# Patient Record
Sex: FEMALE | Race: WHITE | NOT HISPANIC OR LATINO | Employment: OTHER | URBAN - METROPOLITAN AREA
[De-identification: names, ages, dates, MRNs, and addresses within clinical notes are randomized per-mention and may not be internally consistent; named-entity substitution may affect disease eponyms.]

---

## 2017-10-25 ENCOUNTER — HOSPITAL ENCOUNTER (OUTPATIENT)
Dept: NON INVASIVE DIAGNOSTICS | Facility: HOSPITAL | Age: 77
Discharge: HOME | End: 2017-10-25
Attending: PREVENTIVE MEDICINE
Payer: MEDICARE

## 2017-10-25 ENCOUNTER — TRANSCRIBE ORDERS (OUTPATIENT)
Dept: ADMINISTRATIVE | Facility: HOSPITAL | Age: 77
End: 2017-10-25

## 2017-10-25 ENCOUNTER — APPOINTMENT (OUTPATIENT)
Dept: WOUND CARE | Facility: HOSPITAL | Age: 77
End: 2017-10-25
Payer: MEDICARE

## 2017-10-25 DIAGNOSIS — M27.2 ABSCESS OF JAW: ICD-10-CM

## 2017-10-25 DIAGNOSIS — M27.2 ABSCESS OF JAW: Primary | ICD-10-CM

## 2017-10-25 PROCEDURE — 93005 ELECTROCARDIOGRAM TRACING: CPT

## 2017-10-25 PROCEDURE — 99213 OFFICE O/P EST LOW 20 MIN: CPT | Performed by: PREVENTIVE MEDICINE

## 2017-10-27 LAB
ATRIAL RATE: 75 BPM
P AXIS: 83 DEGREES
PR INTERVAL: 156 MS
QRS AXIS: 74 DEGREES
QRSD INTERVAL: 78 MS
QT INTERVAL: 400 MS
QTC INTERVAL: 446 MS
T WAVE AXIS: 69 DEGREES
VENTRICULAR RATE: 75 BPM

## 2017-11-06 ENCOUNTER — APPOINTMENT (OUTPATIENT)
Dept: WOUND CARE | Facility: HOSPITAL | Age: 77
End: 2017-11-06
Payer: MEDICARE

## 2017-11-06 PROCEDURE — G0277 HBOT, FULL BODY CHAMBER, 30M: HCPCS | Performed by: SURGERY

## 2017-11-07 ENCOUNTER — APPOINTMENT (OUTPATIENT)
Dept: WOUND CARE | Facility: HOSPITAL | Age: 77
End: 2017-11-07
Payer: MEDICARE

## 2017-11-07 PROCEDURE — G0277 HBOT, FULL BODY CHAMBER, 30M: HCPCS | Performed by: PREVENTIVE MEDICINE

## 2017-11-08 ENCOUNTER — APPOINTMENT (OUTPATIENT)
Dept: WOUND CARE | Facility: HOSPITAL | Age: 77
End: 2017-11-08
Payer: MEDICARE

## 2017-11-08 PROCEDURE — G0277 HBOT, FULL BODY CHAMBER, 30M: HCPCS | Performed by: FAMILY MEDICINE

## 2017-11-09 ENCOUNTER — APPOINTMENT (OUTPATIENT)
Dept: WOUND CARE | Facility: HOSPITAL | Age: 77
End: 2017-11-09
Payer: MEDICARE

## 2017-11-09 PROCEDURE — G0277 HBOT, FULL BODY CHAMBER, 30M: HCPCS | Performed by: FAMILY MEDICINE

## 2017-11-10 ENCOUNTER — APPOINTMENT (OUTPATIENT)
Dept: WOUND CARE | Facility: HOSPITAL | Age: 77
End: 2017-11-10
Payer: MEDICARE

## 2017-11-10 PROCEDURE — G0277 HBOT, FULL BODY CHAMBER, 30M: HCPCS | Performed by: FAMILY MEDICINE

## 2017-11-13 ENCOUNTER — APPOINTMENT (OUTPATIENT)
Dept: WOUND CARE | Facility: HOSPITAL | Age: 77
End: 2017-11-13
Payer: MEDICARE

## 2017-11-13 PROCEDURE — G0277 HBOT, FULL BODY CHAMBER, 30M: HCPCS | Performed by: FAMILY MEDICINE

## 2017-11-14 ENCOUNTER — APPOINTMENT (OUTPATIENT)
Dept: WOUND CARE | Facility: HOSPITAL | Age: 77
End: 2017-11-14
Payer: MEDICARE

## 2017-11-14 PROCEDURE — G0277 HBOT, FULL BODY CHAMBER, 30M: HCPCS | Performed by: PREVENTIVE MEDICINE

## 2017-11-15 ENCOUNTER — APPOINTMENT (OUTPATIENT)
Dept: WOUND CARE | Facility: HOSPITAL | Age: 77
End: 2017-11-15
Payer: MEDICARE

## 2017-11-15 PROCEDURE — G0277 HBOT, FULL BODY CHAMBER, 30M: HCPCS | Performed by: FAMILY MEDICINE

## 2017-11-16 ENCOUNTER — APPOINTMENT (OUTPATIENT)
Dept: WOUND CARE | Facility: HOSPITAL | Age: 77
End: 2017-11-16
Payer: MEDICARE

## 2017-11-16 PROCEDURE — G0277 HBOT, FULL BODY CHAMBER, 30M: HCPCS | Performed by: FAMILY MEDICINE

## 2017-11-17 ENCOUNTER — APPOINTMENT (OUTPATIENT)
Dept: WOUND CARE | Facility: HOSPITAL | Age: 77
End: 2017-11-17
Payer: MEDICARE

## 2017-11-17 PROCEDURE — G0277 HBOT, FULL BODY CHAMBER, 30M: HCPCS

## 2017-11-20 ENCOUNTER — APPOINTMENT (OUTPATIENT)
Dept: WOUND CARE | Facility: HOSPITAL | Age: 77
End: 2017-11-20
Payer: MEDICARE

## 2017-11-20 PROCEDURE — G0277 HBOT, FULL BODY CHAMBER, 30M: HCPCS | Performed by: SURGERY

## 2017-11-21 ENCOUNTER — APPOINTMENT (OUTPATIENT)
Dept: WOUND CARE | Facility: HOSPITAL | Age: 77
End: 2017-11-21
Payer: MEDICARE

## 2017-11-21 PROCEDURE — G0277 HBOT, FULL BODY CHAMBER, 30M: HCPCS | Performed by: PREVENTIVE MEDICINE

## 2017-11-22 ENCOUNTER — APPOINTMENT (OUTPATIENT)
Dept: WOUND CARE | Facility: HOSPITAL | Age: 77
End: 2017-11-22
Payer: MEDICARE

## 2017-11-22 PROCEDURE — G0277 HBOT, FULL BODY CHAMBER, 30M: HCPCS | Performed by: FAMILY MEDICINE

## 2017-11-24 ENCOUNTER — APPOINTMENT (OUTPATIENT)
Dept: WOUND CARE | Facility: HOSPITAL | Age: 77
End: 2017-11-24
Payer: MEDICARE

## 2017-11-24 PROCEDURE — G0277 HBOT, FULL BODY CHAMBER, 30M: HCPCS

## 2017-11-27 ENCOUNTER — APPOINTMENT (OUTPATIENT)
Dept: WOUND CARE | Facility: HOSPITAL | Age: 77
End: 2017-11-27
Payer: MEDICARE

## 2017-11-27 PROCEDURE — 99213 OFFICE O/P EST LOW 20 MIN: CPT | Performed by: SURGERY

## 2017-12-11 ENCOUNTER — APPOINTMENT (OUTPATIENT)
Dept: WOUND CARE | Facility: HOSPITAL | Age: 77
End: 2017-12-11
Payer: MEDICARE

## 2017-12-11 PROCEDURE — G0277 HBOT, FULL BODY CHAMBER, 30M: HCPCS | Performed by: SURGERY

## 2017-12-12 ENCOUNTER — APPOINTMENT (OUTPATIENT)
Dept: WOUND CARE | Facility: HOSPITAL | Age: 77
End: 2017-12-12
Payer: MEDICARE

## 2017-12-12 PROCEDURE — G0277 HBOT, FULL BODY CHAMBER, 30M: HCPCS | Performed by: PREVENTIVE MEDICINE

## 2017-12-13 ENCOUNTER — APPOINTMENT (OUTPATIENT)
Dept: WOUND CARE | Facility: HOSPITAL | Age: 77
End: 2017-12-13
Payer: MEDICARE

## 2017-12-13 PROCEDURE — G0277 HBOT, FULL BODY CHAMBER, 30M: HCPCS | Performed by: FAMILY MEDICINE

## 2017-12-14 ENCOUNTER — APPOINTMENT (OUTPATIENT)
Dept: WOUND CARE | Facility: HOSPITAL | Age: 77
End: 2017-12-14
Payer: MEDICARE

## 2017-12-14 PROCEDURE — G0277 HBOT, FULL BODY CHAMBER, 30M: HCPCS | Performed by: FAMILY MEDICINE

## 2017-12-15 ENCOUNTER — APPOINTMENT (OUTPATIENT)
Dept: WOUND CARE | Facility: HOSPITAL | Age: 77
End: 2017-12-15
Payer: MEDICARE

## 2017-12-15 PROCEDURE — G0277 HBOT, FULL BODY CHAMBER, 30M: HCPCS | Performed by: SURGERY

## 2017-12-18 ENCOUNTER — APPOINTMENT (OUTPATIENT)
Dept: WOUND CARE | Facility: HOSPITAL | Age: 77
End: 2017-12-18
Payer: MEDICARE

## 2017-12-18 PROCEDURE — G0277 HBOT, FULL BODY CHAMBER, 30M: HCPCS | Performed by: SURGERY

## 2017-12-19 ENCOUNTER — APPOINTMENT (OUTPATIENT)
Dept: WOUND CARE | Facility: HOSPITAL | Age: 77
End: 2017-12-19
Payer: MEDICARE

## 2017-12-19 PROCEDURE — G0277 HBOT, FULL BODY CHAMBER, 30M: HCPCS | Performed by: PREVENTIVE MEDICINE

## 2017-12-20 ENCOUNTER — APPOINTMENT (OUTPATIENT)
Dept: WOUND CARE | Facility: HOSPITAL | Age: 77
End: 2017-12-20
Payer: MEDICARE

## 2017-12-20 PROCEDURE — G0277 HBOT, FULL BODY CHAMBER, 30M: HCPCS | Performed by: FAMILY MEDICINE

## 2017-12-21 ENCOUNTER — APPOINTMENT (OUTPATIENT)
Dept: WOUND CARE | Facility: HOSPITAL | Age: 77
End: 2017-12-21
Payer: MEDICARE

## 2017-12-21 PROCEDURE — G0277 HBOT, FULL BODY CHAMBER, 30M: HCPCS | Performed by: FAMILY MEDICINE

## 2017-12-22 ENCOUNTER — APPOINTMENT (OUTPATIENT)
Dept: WOUND CARE | Facility: HOSPITAL | Age: 77
End: 2017-12-22
Payer: MEDICARE

## 2017-12-22 PROCEDURE — G0277 HBOT, FULL BODY CHAMBER, 30M: HCPCS

## 2017-12-26 ENCOUNTER — APPOINTMENT (OUTPATIENT)
Dept: WOUND CARE | Facility: HOSPITAL | Age: 77
End: 2017-12-26
Payer: MEDICARE

## 2017-12-26 PROCEDURE — G0277 HBOT, FULL BODY CHAMBER, 30M: HCPCS | Performed by: PREVENTIVE MEDICINE

## 2017-12-27 ENCOUNTER — APPOINTMENT (OUTPATIENT)
Dept: WOUND CARE | Facility: HOSPITAL | Age: 77
End: 2017-12-27
Payer: MEDICARE

## 2017-12-27 PROCEDURE — G0277 HBOT, FULL BODY CHAMBER, 30M: HCPCS | Performed by: FAMILY MEDICINE

## 2017-12-28 ENCOUNTER — APPOINTMENT (OUTPATIENT)
Dept: WOUND CARE | Facility: HOSPITAL | Age: 77
End: 2017-12-28
Payer: MEDICARE

## 2017-12-28 PROCEDURE — G0277 HBOT, FULL BODY CHAMBER, 30M: HCPCS | Performed by: FAMILY MEDICINE

## 2017-12-29 ENCOUNTER — APPOINTMENT (OUTPATIENT)
Dept: WOUND CARE | Facility: HOSPITAL | Age: 77
End: 2017-12-29
Payer: MEDICARE

## 2017-12-29 PROCEDURE — G0277 HBOT, FULL BODY CHAMBER, 30M: HCPCS | Performed by: FAMILY MEDICINE

## 2018-01-02 ENCOUNTER — APPOINTMENT (OUTPATIENT)
Dept: WOUND CARE | Facility: HOSPITAL | Age: 78
End: 2018-01-02
Payer: MEDICARE

## 2018-01-02 PROCEDURE — G0277 HBOT, FULL BODY CHAMBER, 30M: HCPCS | Performed by: PREVENTIVE MEDICINE

## 2018-01-03 ENCOUNTER — APPOINTMENT (OUTPATIENT)
Dept: WOUND CARE | Facility: HOSPITAL | Age: 78
End: 2018-01-03
Payer: MEDICARE

## 2018-01-03 PROCEDURE — G0277 HBOT, FULL BODY CHAMBER, 30M: HCPCS | Performed by: FAMILY MEDICINE

## 2018-03-28 ENCOUNTER — APPOINTMENT (OUTPATIENT)
Dept: WOUND CARE | Facility: HOSPITAL | Age: 78
End: 2018-03-28
Payer: MEDICARE

## 2018-03-28 PROCEDURE — 99213 OFFICE O/P EST LOW 20 MIN: CPT | Performed by: FAMILY MEDICINE

## 2018-04-02 ENCOUNTER — APPOINTMENT (OUTPATIENT)
Dept: WOUND CARE | Facility: HOSPITAL | Age: 78
End: 2018-04-02
Payer: MEDICARE

## 2018-04-02 PROCEDURE — G0277 HBOT, FULL BODY CHAMBER, 30M: HCPCS | Performed by: SURGERY

## 2018-04-03 ENCOUNTER — APPOINTMENT (OUTPATIENT)
Dept: WOUND CARE | Facility: HOSPITAL | Age: 78
End: 2018-04-03
Payer: MEDICARE

## 2018-04-03 PROCEDURE — G0277 HBOT, FULL BODY CHAMBER, 30M: HCPCS | Performed by: PREVENTIVE MEDICINE

## 2018-04-04 ENCOUNTER — APPOINTMENT (OUTPATIENT)
Dept: WOUND CARE | Facility: HOSPITAL | Age: 78
End: 2018-04-04
Payer: MEDICARE

## 2018-04-04 PROCEDURE — G0277 HBOT, FULL BODY CHAMBER, 30M: HCPCS | Performed by: FAMILY MEDICINE

## 2018-04-05 ENCOUNTER — APPOINTMENT (OUTPATIENT)
Dept: WOUND CARE | Facility: HOSPITAL | Age: 78
End: 2018-04-05
Payer: MEDICARE

## 2018-04-05 PROCEDURE — G0277 HBOT, FULL BODY CHAMBER, 30M: HCPCS | Performed by: FAMILY MEDICINE

## 2018-04-06 ENCOUNTER — APPOINTMENT (OUTPATIENT)
Dept: WOUND CARE | Facility: HOSPITAL | Age: 78
End: 2018-04-06
Payer: MEDICARE

## 2018-04-06 PROCEDURE — G0277 HBOT, FULL BODY CHAMBER, 30M: HCPCS | Performed by: FAMILY MEDICINE

## 2018-04-09 ENCOUNTER — APPOINTMENT (OUTPATIENT)
Dept: WOUND CARE | Facility: HOSPITAL | Age: 78
End: 2018-04-09
Payer: MEDICARE

## 2018-04-09 PROCEDURE — G0277 HBOT, FULL BODY CHAMBER, 30M: HCPCS | Performed by: SURGERY

## 2018-04-10 ENCOUNTER — APPOINTMENT (OUTPATIENT)
Dept: WOUND CARE | Facility: HOSPITAL | Age: 78
End: 2018-04-10
Payer: MEDICARE

## 2018-04-10 PROCEDURE — G0277 HBOT, FULL BODY CHAMBER, 30M: HCPCS | Performed by: PREVENTIVE MEDICINE

## 2018-04-11 ENCOUNTER — APPOINTMENT (OUTPATIENT)
Dept: WOUND CARE | Facility: HOSPITAL | Age: 78
End: 2018-04-11
Payer: MEDICARE

## 2018-04-11 PROCEDURE — G0277 HBOT, FULL BODY CHAMBER, 30M: HCPCS | Performed by: FAMILY MEDICINE

## 2018-04-12 ENCOUNTER — APPOINTMENT (OUTPATIENT)
Dept: WOUND CARE | Facility: HOSPITAL | Age: 78
End: 2018-04-12
Payer: MEDICARE

## 2018-04-12 PROCEDURE — G0277 HBOT, FULL BODY CHAMBER, 30M: HCPCS | Performed by: FAMILY MEDICINE

## 2018-04-13 ENCOUNTER — APPOINTMENT (OUTPATIENT)
Dept: WOUND CARE | Facility: HOSPITAL | Age: 78
End: 2018-04-13
Payer: MEDICARE

## 2018-04-13 PROCEDURE — G0277 HBOT, FULL BODY CHAMBER, 30M: HCPCS | Performed by: FAMILY MEDICINE

## 2020-09-17 ENCOUNTER — OFFICE VISIT (OUTPATIENT)
Dept: CARDIOLOGY CLINIC | Facility: CLINIC | Age: 80
End: 2020-09-17
Payer: MEDICARE

## 2020-09-17 VITALS
WEIGHT: 119 LBS | SYSTOLIC BLOOD PRESSURE: 120 MMHG | BODY MASS INDEX: 19.8 KG/M2 | HEART RATE: 62 BPM | TEMPERATURE: 98.9 F | DIASTOLIC BLOOD PRESSURE: 70 MMHG

## 2020-09-17 DIAGNOSIS — I31.3 PERICARDIAL EFFUSION: ICD-10-CM

## 2020-09-17 DIAGNOSIS — J43.2 CENTRILOBULAR EMPHYSEMA (HCC): ICD-10-CM

## 2020-09-17 DIAGNOSIS — I25.10 CORONARY ARTERY DISEASE INVOLVING NATIVE CORONARY ARTERY OF NATIVE HEART WITHOUT ANGINA PECTORIS: ICD-10-CM

## 2020-09-17 DIAGNOSIS — I95.1 ORTHOSTATIC HYPOTENSION: ICD-10-CM

## 2020-09-17 DIAGNOSIS — Z85.118 HISTORY OF LUNG CANCER IN ADULTHOOD: ICD-10-CM

## 2020-09-17 DIAGNOSIS — E44.0 MODERATE PROTEIN-CALORIE MALNUTRITION (HCC): ICD-10-CM

## 2020-09-17 DIAGNOSIS — I47.1 SUPRAVENTRICULAR TACHYCARDIA (HCC): ICD-10-CM

## 2020-09-17 DIAGNOSIS — I48.0 PAROXYSMAL ATRIAL FIBRILLATION (HCC): Primary | ICD-10-CM

## 2020-09-17 DIAGNOSIS — I08.0 MITRAL AND AORTIC VALVE REGURGITATION: ICD-10-CM

## 2020-09-17 PROBLEM — Z90.2 HISTORY OF LOBECTOMY OF LUNG: Status: ACTIVE | Noted: 2020-09-17

## 2020-09-17 PROBLEM — Z92.3 HISTORY OF HEAD AND NECK RADIATION: Status: ACTIVE | Noted: 2020-09-17

## 2020-09-17 PROBLEM — I51.89 DIASTOLIC DYSFUNCTION WITHOUT HEART FAILURE: Status: ACTIVE | Noted: 2020-09-17

## 2020-09-17 PROCEDURE — 93000 ELECTROCARDIOGRAM COMPLETE: CPT | Performed by: INTERNAL MEDICINE

## 2020-09-17 PROCEDURE — 99204 OFFICE O/P NEW MOD 45 MIN: CPT | Performed by: INTERNAL MEDICINE

## 2020-09-17 RX ORDER — ATORVASTATIN CALCIUM 20 MG/1
TABLET, FILM COATED ORAL
COMMUNITY

## 2020-09-17 RX ORDER — ERGOCALCIFEROL (VITAMIN D2) 200 MCG/ML
8000 DROPS ORAL DAILY
COMMUNITY
Start: 2020-08-07 | End: 2020-12-23 | Stop reason: ALTCHOICE

## 2020-09-17 RX ORDER — MIDODRINE HYDROCHLORIDE 2.5 MG/1
TABLET ORAL
COMMUNITY
Start: 2020-09-15 | End: 2021-01-01 | Stop reason: HOSPADM

## 2020-09-17 RX ORDER — LEVOTHYROXINE SODIUM 112 UG/1
112 TABLET ORAL DAILY
COMMUNITY
Start: 2020-08-19

## 2020-09-17 RX ORDER — DIGOXIN 250 MCG
TABLET ORAL
COMMUNITY
Start: 2020-07-23 | End: 2020-11-23 | Stop reason: SDUPTHER

## 2020-09-17 RX ORDER — ATENOLOL 25 MG/1
12.5 TABLET ORAL DAILY
COMMUNITY
Start: 2020-08-20 | End: 2021-01-01

## 2020-09-17 NOTE — PATIENT INSTRUCTIONS
CARDIOLOGY ASSESSMENT & PLAN:   Diagnosis ICD-10-CM Associated Orders   1  Paroxysmal atrial fibrillation (HCC)  I48 0 POCT ECG   2  Supraventricular tachycardia (HCC)  I47 1    3  Orthostatic hypotension  I95 1    4  Coronary artery disease involving native coronary artery of native heart without angina pectoris  I25 10    5  Moderate protein-calorie malnutrition (HCC)  E44 0    6  Mitral and aortic valve regurgitation  I08 0    7  Pericardial effusion  I31 3    8  History of lung cancer in adulthood  Z85 118    9  Centrilobular emphysema (HCC)  J43 2        Paroxysmal atrial fibrillation ([address] )  Ms Lois Irizarry is a pleasant 78-year-old female with paroxysmal atrial fibrillation initially diagnosed around 2017 and history of supraventricular tachycardia  She is noted to have recent atrial fibrillation episode with significant symptoms  Unfortunately atrial fibrillation is complicated with chronic hypotension, intolerance to beta-blocker, higher dose digoxin and calcium channel blocker therapy and protein calorie malnutrition  -- at this time I am advising her to continue current medical therapy with Eliquis 2 5 mg twice daily and atenolol 12 5 mg once daily  She is advised to record home heart rates and blood pressures and right down  If her systolic blood pressures are consistently less than 95 mmHg then we should increase the dose of midodrine to 5 mg 3 times daily  -- if she has recurrent episodes of atrial fibrillation and she is unable to get rate control medications then will have to consider her for AV nataly ablation with pacemaker implantation  -- she is advised to continue normal activities, and keep a diary of symptoms  -- Dietary and medical compliance are reinforced  -- Advised  to report any concerning symptoms such as chest pain, shortness of breath, decline in exercise tolerance or presyncope/syncope      -- advised that she may continue to be followed at [facility] [address] however if she does not want then we will be happy to follow her in our device clinic  I advised her that it is better to follow with 1 group of physicians as different physicians managing her treatment may risk mixing up of medications and cause symptoms    HIS

## 2020-09-17 NOTE — PROGRESS NOTES
[facility]  [id number] [facility] [address]  Phone#  [phone]  Fax#  [phone]  *-*-*-*-*-*-*-*-*-*-*-*-*-*-*-*-*-*-*-*-*-*-*-*-*-*-*-*-*-*-*-*-*-*-*-*-*-*-*-*-*-*-*-*-*-*-*-*-*-*-*-*-*-*  Nicole Wilder DATE: 09/17/20 6:13 PM  PATIENT NAME: Elise Daily [place]   1940    [id number]  Age: 78 y o  Sex: female  AUTHOR: Brian Barr MD  PRIMARYCARE PHYSICIAN: Gisele Garcia DO  REFERRING PHYSICIAN: Gisele Garcia DO  [facility] [address] [place] [address]   *-*-*-*-*-*-*-*-*-*-*-*-*-*-*-*-*-*-*-*-*-*-*-*-*-*-*-*-*-*-*-*-*-*-*-*-*-*-*-*-*-*-*-*-*-*-*-*-*-*-*-*-*-*-  REASON FOR REFERRAL:  Evaluation and management of paroxysmal atrial fibrillation with rapid ventricular response    *-*-*-*-*-*-*-*-*-*-*-*-*-*-*-*-*-*-*-*-*-*-*-*-*-*-*-*-*-*-*-*-*-*-*-*-*-*-*-*-*-*-*-*-*-*-*-*-*-*-*-*-*-*-  CARDIOLOGY ASSESSMENT & PLAN:   Diagnosis ICD-10-CM Associated Orders   1  Paroxysmal atrial fibrillation (HCC)  I48 0 POCT ECG   2  Supraventricular tachycardia (HCC)  I47 1    3  Orthostatic hypotension  I95 1    4  Coronary artery disease involving native coronary artery of native heart without angina pectoris  I25 10    5  Moderate protein-calorie malnutrition (HCC)  E44 0    6  Mitral and aortic valve regurgitation  I08 0    7  Pericardial effusion  I31 3    8  History of lung cancer in adulthood  Z85 118    9  Centrilobular emphysema (HCC)  J43 2        Paroxysmal atrial fibrillation ([facility] [address] )  Ms Tyra Melgoza is a pleasant 78-year-old female with paroxysmal atrial fibrillation initially diagnosed around 2017 and history of supraventricular tachycardia  She is noted to have recent atrial fibrillation episode with significant symptoms  Unfortunately atrial fibrillation is complicated with chronic hypotension, intolerance to beta-blocker, higher dose digoxin and calcium channel blocker therapy and protein calorie malnutrition      -- at this time I am advising her to continue current medical therapy with Eliquis 2 5 mg twice daily and atenolol 12 5 mg once daily  She is advised to record home heart rates and blood pressures and right down  If her systolic blood pressures are consistently less than 95 mmHg then we should increase the dose of midodrine to 5 mg 3 times daily  -- if she has recurrent episodes of atrial fibrillation and she is unable to get rate control medications then will have to consider her for AV nataly ablation with pacemaker implantation  -- she is advised to continue normal activities, and keep a diary of symptoms  -- Dietary and medical compliance are reinforced  -- Advised  to report any concerning symptoms such as chest pain, shortness of breath, decline in exercise tolerance or presyncope/syncope  -- advised that she may continue to be followed at [facility] however if she does not want then we will be happy to follow her in our device clinic  I advised her that it is better to follow with 1 group of physicians as different physicians managing her treatment may risk mixing up of medications and cause symptoms  *-*-*-*-*-*-*-*-*-*-*-*-*-*-*-*-*-*-*-*-*-*-*-*-*-*-*-*-*-*-*-*-*-*-*-*-*-*-*-*-*-*-*-*-*-*-*-*-*-*-*-*-*-*-  CURRENT ECG:  Results for orders placed or performed in visit on 09/17/20   POCT ECG    Narrative    Sinus rhythm, HR 62 beats per minute, low QRS voltages, normal axis, normal intervals, possible prior anteroseptal infarction, no significant ST T-wave abnormalities  *-*-*-*-*-*-*-*-*-*-*-*-*-*-*-*-*-*-*-*-*-*-*-*-*-*-*-*-*-*-*-*-*-*-*-*-*-*-*-*-*-*-*-*-*-*-*-*-*-*-*-*-*-*-  HISTORY OF PRESENT ILLNESS:  Ms Armin Nicholson is a pleasant 70-year-old  female with medical history significant for:    1  Coronary artery disease with nonobstructive CAD identified in 2015, 60% disease in LAD  2   Paroxysmal atrial fibrillation diagnosed in the remote past and noted to have recurrent bouts in the last few years  3  Essential hypertension  4  Orthostatic hypotension  5  Premature ventricular contractions  6  History of lung carcinoma stage IB (January 2013) status post  left upper lobectomy  7  Head and neck cancer April 2015, status s/p radiation therapy  7  Monoclonal gammopathy of unknown significance (MGUS)  9  History of dysphagia and gastroparesis, status post SPECT to placement in 2018  10  Status post loop recorder implantation May 2019  11  Hypothyroidism  12  Mixed dyslipidemia  13  Autonomic dysfunction    She is establishing care with us for management of her paroxysmal atrial fibrillation which she has been dealing for at least last 3 years  She has been followed at [address] with Dr Samanta Montana for last several years and was most recently seen by him on 20th of August   Patient states that she is having difficulty in following with [facility] and is establishing care with us for convenience sake  She was previously diagnosed with paroxysmal SVT and premature ventricular contractions  Recently on 18th of August she woke up from sleep with feeling of feeling dizzy and lightheaded and having uneasiness and tingling in her arms along with nausea  She activated her loop recorder device and transmission and this revealed revealed atrial fibrillation with rapid ventricular response the duration of which was for 1 hour and 50 minutes  Episode started at 746 in the morning  She was started on Eliquis based on her age and body weight  Her digoxin dose was not increased because she had previous toxicity when the level was higher  Subsequently she saw Dr Viktoriya Milan on August 20th and she was advised to take low-dose atenolol  (Previously patient was tried on metoprolol and calcium channel blockers but she has unable to take this medication due to low blood pressures and symptomatic hypotension  She has also been on midodrine in the past)      Since the last episode she has been feeling all right but has intermittent lightheadedness and her blood pressures have been low especially when she takes the metoprolol  Patient has severe protein calorie malnutrition as she is totally dependent on feeding through PEG tube as she cannot swallow due to her history of head and neck cancer  She is functionally quite active and has had no recent chest pain, unusual shortness of breath, orthopnea, palpitations, presyncope or syncope  She recently underwent echocardiogram at [facility]  Functional capacity status:  Good   (Excellent- >10 METs; Good: (7-10 METs); Moderate (4-7 METs); Poor (<= 4 METs)    Any chronic stressors:  None   (feeling of poor health, financial problems, and social isolation etc)  Tobacco or alcohol dependence: She quit smoking in 1985  She does not drink  *-*-*-*-*-*-*-*-*-*-*-*-*-*-*-*-*-*-*-*-*-*-*-*-*-*-*-*-*-*-*-*-*-*-*-*-*-*-*-*-*-*-*-*-*-*-*-*-*-*-*-*-*-*  PAST MEDICAL HISTORY:  Past Medical History:   Diagnosis Date    Abnormal ECG     Arrhythmia     Asthma     Atrial fibrillation (HCC)     Coronary artery disease     Disease of thyroid gland         1  Coronary artery disease with nonobstructive CAD identified in 2015, 60% disease in LAD  2  Paroxysmal atrial fibrillation diagnosed in the remote past and noted to have recurrent bouts in the last few years  3  Essential hypertension  4  Orthostatic hypotension  5  Premature ventricular contractions  6  History of lung carcinoma  (January 2013) status post  left upper lobectomy  Now with centrilobular emphysema  7  Head and neck cancer April 2015, status s/p radiation therapy  7  Monoclonal gammopathy of unknown significance (MGUS)  9  History of dysphagia and gastroparesis, status post SPECT to placement in 2018  10  Status post loop recorder implantation May 2019  11  Hypothyroidism  12  Mixed dyslipidemia  13  Autonomic dysfunction  14   Protein calorie malnutrition   PAST SURGICAL HISTORY:   Past Surgical History:   Procedure Laterality Date    CARDIAC CATHETERIZATION           FAMILY HISTORY:  Problem Relation Age of Onset    Coronary artery disease Father    Lung cancer Sister    Stroke Brother    Coronary artery disease Mother    Lung disease Daughter   metastatic lung cancer, dx age 62    Other Son   TBI due to accident    No Known Problems Son    No Known Problems Son    SOCIAL HISTORY:  Social History     Tobacco Use   Smoking Status Current Every Day Smoker    Packs/day: 0 50    Years: 20 00    Pack years: 10 00      Social History     Substance and Sexual Activity   Alcohol Use Not Currently     Social History     Substance and Sexual Activity   Drug Use Never         *-*-*-*-*-*-*-*-*-*-*-*-*-*-*-*-*-*-*-*-*-*-*-*-*-*-*-*-*-*-*-*-*-*-*-*-*-*-*-*-*-*-*-*-*-*-*-*-*-*-*-*-*-*  ALLERGIES:  Allergies   Allergen Reactions    Lisinopril Anaphylaxis and Other (See Comments)     Other reaction(s): Other (See Comments)  stridor      Albuterol Other (See Comments)     Other reaction(s): Other (See Comments)  Afib  Causes afib      Methylprednisolone Other (See Comments)     Dizziness   Patient states adverse effect, "i feel weird in the head"    CURRENT SCHEDULED MEDICATIONS:    Current Outpatient Medications:     apixaban (ELIQUIS) 2 5 mg, Take 2 5 mg by mouth 2 (two) times a day, Disp: , Rfl:     ergocalciferol (DRISDOL) 200 MCG/ML drops, 8,000 Units by Enteral route daily, Disp: , Rfl:     atenolol (TENORMIN) 25 mg tablet, Take 12 5 mg by mouth daily, Disp: , Rfl:     atorvastatin (LIPITOR) 20 mg tablet, atorvastatin 20 mg tablet, Disp: , Rfl:     digoxin (LANOXIN) 0 25 mg tablet, TAKE 0 5 TABLETS (0 125 MG TOTAL) BY G TUBE ROUTE DAILY  (1/2 TABLET), Disp: , Rfl:     levothyroxine 112 mcg tablet, Take 112 mcg by mouth daily, Disp: , Rfl:     midodrine (PROAMATINE) 2 5 mg tablet, , Disp: , Rfl: *-*-*-*-*-*-*-*-*-*-*-*-*-*-*-*-*-*-*-*-*-*-*-*-*-*-*-*-*-*-*-*-*-*-*-*-*-*-*-*-*-*-*-*-*-*-*-*-*-*-*-*-*-*  REVIEW OF SYMPTOMS:    Positive for:  Intermittent dizziness and lightheadedness, fatigue, recent episode of palpitation  Negative for: All remaining as reviewed below and in HPI   SYSTEM SYMPTOMS REVIEWED:  General--weight change, fever, night sweats  Respiratoryl-- Wheezing, shortness of breath, cough, URI symptoms, sputum, blood  Cardiovascular--chest pain, syncope, dyspnea on exertion, edema, decline in exercise tolerance, claudication   Gastrointestinal--persistent vomiting, diarrhea, abdominal distention, blood in stool   Muscular or skeletal--joint pain or swelling   Neurologic--headaches, syncope, abnormal movement  Hematologic--history of easy bruising and bleeding   Endocrine--thyroid enlargement, heat or cold intolerance, polyuria   Psychiatric--anxiety, depression      *-*-*-*-*-*-*-*-*-*-*-*-*-*-*-*-*-*-*-*-*-*-*-*-*-*-*-*-*-*-*-*-*-*-*-*-*-*-*-*-*-*-*-*-*-*-*-*-*-*-*-*-*-*  CURRENT OUTPATIENT MEDICATIONS:     Current Outpatient Medications:     apixaban (ELIQUIS) 2 5 mg, Take 2 5 mg by mouth 2 (two) times a day, Disp: , Rfl:     ergocalciferol (DRISDOL) 200 MCG/ML drops, 8,000 Units by Enteral route daily, Disp: , Rfl:     atenolol (TENORMIN) 25 mg tablet, Take 12 5 mg by mouth daily, Disp: , Rfl:     atorvastatin (LIPITOR) 20 mg tablet, atorvastatin 20 mg tablet, Disp: , Rfl:     digoxin (LANOXIN) 0 25 mg tablet, TAKE 0 5 TABLETS (0 125 MG TOTAL) BY G TUBE ROUTE DAILY  (1/2 TABLET), Disp: , Rfl:     levothyroxine 112 mcg tablet, Take 112 mcg by mouth daily, Disp: , Rfl:     midodrine (PROAMATINE) 2 5 mg tablet, , Disp: , Rfl:     *-*-*-*-*-*-*-*-*-*-*-*-*-*-*-*-*-*-*-*-*-*-*-*-*-*-*-*-*-*-*-*-*-*-*-*-*-*-*-*-*-*-*-*-*-*-*-*-*-*-*-*-*-*  VITAL SIGNS:  Vitals:    09/17/20 1440   BP: 120/70   BP Location: Left arm   Patient Position: Sitting   Cuff Size: Adult   Pulse: 62   Temp: 98 9 °F (37 2 °C)   Weight: 54 kg (119 lb)     Weight (last 2 days)     Date/Time   Weight    09/17/20 1440   54 (119)           ,   Wt Readings from Last 3 Encounters:   09/17/20 54 kg (119 lb)   04/08/16 45 4 kg (100 lb)    , Body mass index is 19 8 kg/m²  *-*-*-*-*-*-*-*-*-*-*-*-*-*-*-*-*-*-*-*-*-*-*-*-*-*-*-*-*-*-*-*-*-*-*-*-*-*-*-*-*-*-*-*-*-*-*-*-*-*-*-*-*-*-  PHYSICAL EXAM:  General Appearance:    Alert, cooperative, no distress, appears stated age, thin the built, slightly frail   Head, Eyes, ENT:     signs of protein calorie malnutrition, moist mucous mebranes  Evidence of focal cord dysfunction during speech  Neck:    extensive thinning and scarring of the neck region going to her history of neck cancer and thyroid dysfunction  Back:     Symmetric, no curvature  Has kyphosis  Lungs:     Respirations unlabored  decreased breath sounds bilaterally without wheeze rhonchi or rales,    Chest wall:    No tenderness or deformity   Heart:    Regular rate and rhythm, S1 and S2 normal, no murmur, rub  or gallop  Abdomen:     Soft, has  PEG tube you been place   Extremities:   Extremities normal, no cyanosis or edema    Skin:   Skin color, texture, turgor normal, no rashes or lesions     *-*-*-*-*-*-*-*-*-*-*-*-*-*-*-*-*-*-*-*-*-*-*-*-*-*-*-*-*-*-*-*-*-*-*-*-*-*-*-*-*-*-*-*-*-*-*-*-*-*-*-*-*-*-  LABORATORY DATA: I have personally reviewed the available laboratory data  Recent blood work at [facility] lobe arteries on 26th of August is reviewed  Her renal function and electrolytes are normal, sodium was 136, potassium was 4 5, her TSH in June 2020 was normal at 1 29  Her digoxin level was 1 2 on June 1, 2020           No results found for: K, CL, CO2, ANIONGAP, BUN, CREATININE, EGFR, GLUCOSE, CALCIUM, AST, ALT, ALKPHOS, PROT, BILITOT, MG  No results found for: WBC, HGB, PLT  No results found for: PT, PTT, INR  No results found for: CKMB, DIGOXIN  No results found for: TSH  No results found for: CHOL, HDL, LDL, TRIG   No results found for: HGBA1C  No results found for: Bandar Reeve, GRAMSTAIN, URINECX, WOUNDCULT, BODYFLUIDCUL, MRSACULTURE, INFLUAPCR, INFLUBPCR, RSVPCR, LEGIONELLAUR, CDIFFTOXINB    *-*-*-*-*-*-*-*-*-*-*-*-*-*-*-*-*-*-*-*-*-*-*-*-*-*-*-*-*-*-*-*-*-*-*-*-*-*-*-*-*-*-*-*-*-*-*-*-*-*-*-*-*-*-  RADIOLOGY RESULTS:  No results found  *-*-*-*-*-*-*-*-*-*-*-*-*-*-*-*-*-*-*-*-*-*-*-*-*-*-*-*-*-*-*-*-*-*-*-*-*-*-*-*-*-*-*-*-*-*-*-*-*-*-*-*-*-*-  LAST ECHOCARDIOGRAM AND OTHER CARDIOLOGY RESULTS:  Echocardiogram August 26, 2020:    Left Ventricle: There is mild concentric hypertrophy    Left Ventricle: Systolic function is normal with an ejection fraction   of 55-60%    Left Ventricle: Wall motion cannot be accurately assessed    Left Ventricle: There is grade I (mild) diastolic dysfunction and   normal left atrial pressure    Aortic Valve: The aortic valve is trileaflet  There is mild sclerosis    Aortic Valve: There is mild regurgitation    Mitral Valve: There is mild regurgitation    Pericardium: There is a small pericardial effusion    Pericardium: There is no echocardiographic evidence of tamponade  12/2015 Echo - EF 65%, mild AR, mild DD  6/2017 documented in ER lasting 30 seconds  2/2018 Echo - EF 60%, mod TR, PA 50-70  8/2019 Echo - EF 65%, normal  8/2020 detected on ILR, started on eliquis          *-*-*-*-*-*-*-*-*-*-*-*-*-*-*-*-*-*-*-*-*-*-*-*-*-*-*-*-*-*-*-*-*-*-*-*-*-*-*-*-*-*-*-*-*-*-*-*-*-*-*-*-*-*-  RADIOLOGY RESULTS:  No results found  *-*-*-*-*-*-*-*-*-*-*-*-*-*-*-*-*-*-*-*-*-*-*-*-*-*-*-*-*-*-*-*-*-*-*-*-*-*-*-*-*-*-*-*-*-*-*-*-*-*-*-*-*-*-  ECHOCARDIOGRAM AND OTHER CARDIOLOGY RESULTS:  No results found for this or any previous visit  No results found for this or any previous visit  No results found for this or any previous visit  No results found for this or any previous visit  *-*-*-*-*-*-*-*-*-*-*-*-*-*-*-*-*-*-*-*-*-*-*-*-*-*-*-*-*-*-*-*-*-*-*-*-*-*-*-*-*-*-*-*-*-*-*-*-*-*-*-*-*-*-  SIGNATURES:   @[facility]@   Ian Oneill MD     CC:   DO Shani Wilhelm DO

## 2020-09-17 NOTE — ASSESSMENT & PLAN NOTE
Ms Rusty Negron is a pleasant 26-year-old female with paroxysmal atrial fibrillation initially diagnosed around 2017 and history of supraventricular tachycardia  She is noted to have recent atrial fibrillation episode with significant symptoms  Unfortunately atrial fibrillation is complicated with chronic hypotension, intolerance to beta-blocker, higher dose digoxin and calcium channel blocker therapy and protein calorie malnutrition  -- at this time I am advising her to continue current medical therapy with Eliquis 2 5 mg twice daily and atenolol 12 5 mg once daily  She is advised to record home heart rates and blood pressures and right down  If her systolic blood pressures are consistently less than 95 mmHg then we should increase the dose of midodrine to 5 mg 3 times daily  -- if she has recurrent episodes of atrial fibrillation and she is unable to get rate control medications then will have to consider her for AV nataly ablation with pacemaker implantation  -- she is advised to continue normal activities, and keep a diary of symptoms  -- Dietary and medical compliance are reinforced  -- Advised  to report any concerning symptoms such as chest pain, shortness of breath, decline in exercise tolerance or presyncope/syncope  -- advised that she may continue to be followed at [facility] however if she does not want then we will be happy to follow her in our device clinic  I advised her that it is better to follow with 1 group of physicians as different physicians managing her treatment may risk mixing up of medications and cause symptoms

## 2020-09-21 ENCOUNTER — TELEPHONE (OUTPATIENT)
Dept: CARDIOLOGY CLINIC | Facility: CLINIC | Age: 80
End: 2020-09-21

## 2020-09-21 NOTE — TELEPHONE ENCOUNTER
Pt called stating Dr Kathy Ferreira called her left message he received her info from [facility] and was Joy give her a return call because he had some questions  Pt states Dr Kathy Ferreira has not yet returned call

## 2020-09-25 ENCOUNTER — IN-CLINIC DEVICE VISIT (OUTPATIENT)
Dept: CARDIOLOGY CLINIC | Facility: CLINIC | Age: 80
End: 2020-09-25
Payer: MEDICARE

## 2020-09-25 DIAGNOSIS — Z95.818 PRESENCE OF OTHER CARDIAC IMPLANTS AND GRAFTS: Primary | ICD-10-CM

## 2020-09-25 PROCEDURE — 93291 INTERROG DEV EVAL SCRMS IP: CPT | Performed by: INTERNAL MEDICINE

## 2020-09-25 NOTE — PROGRESS NOTES
Results for orders placed or performed in visit on 09/25/20   Cardiac EP device report    Narrative    DEVICE INTERROGATED IN THE [facility] OFFICE LOOP: TEMP: 98 4  BATTERY VOLTAGE "OK"  PRESENTING RHYTHM: NSR @ 68 BPM   R-WAVES: 0 33mV  NO PATIENT OR DEVICE ACTIVATED EPISODES  NO PROGRAMMING CHANGES MADE TO DEVICE PARAMETERS  NORMAL DEVICE FUNCTION    [address] [place] [address]

## 2020-10-19 ENCOUNTER — OFFICE VISIT (OUTPATIENT)
Dept: CARDIOLOGY CLINIC | Facility: CLINIC | Age: 80
End: 2020-10-19
Payer: MEDICARE

## 2020-10-19 VITALS
BODY MASS INDEX: 20.16 KG/M2 | HEIGHT: 65 IN | HEART RATE: 80 BPM | SYSTOLIC BLOOD PRESSURE: 151 MMHG | DIASTOLIC BLOOD PRESSURE: 89 MMHG | WEIGHT: 121 LBS | TEMPERATURE: 98 F

## 2020-10-19 DIAGNOSIS — I48.0 PAROXYSMAL ATRIAL FIBRILLATION (HCC): Primary | ICD-10-CM

## 2020-10-19 DIAGNOSIS — I47.1 SUPRAVENTRICULAR TACHYCARDIA (HCC): ICD-10-CM

## 2020-10-19 DIAGNOSIS — J43.2 CENTRILOBULAR EMPHYSEMA (HCC): ICD-10-CM

## 2020-10-19 DIAGNOSIS — I25.10 CORONARY ARTERY DISEASE INVOLVING NATIVE CORONARY ARTERY OF NATIVE HEART WITHOUT ANGINA PECTORIS: ICD-10-CM

## 2020-10-19 DIAGNOSIS — I31.3 PERICARDIAL EFFUSION: ICD-10-CM

## 2020-10-19 DIAGNOSIS — I51.89 DIASTOLIC DYSFUNCTION WITHOUT HEART FAILURE: ICD-10-CM

## 2020-10-19 DIAGNOSIS — E44.0 MODERATE PROTEIN-CALORIE MALNUTRITION (HCC): ICD-10-CM

## 2020-10-19 DIAGNOSIS — I08.0 MITRAL AND AORTIC VALVE REGURGITATION: ICD-10-CM

## 2020-10-19 PROCEDURE — 99214 OFFICE O/P EST MOD 30 MIN: CPT | Performed by: INTERNAL MEDICINE

## 2020-11-17 ENCOUNTER — TELEPHONE (OUTPATIENT)
Dept: CARDIOLOGY CLINIC | Facility: CLINIC | Age: 80
End: 2020-11-17

## 2020-11-18 DIAGNOSIS — I48.0 PAF (PAROXYSMAL ATRIAL FIBRILLATION) (HCC): Primary | ICD-10-CM

## 2020-11-22 ENCOUNTER — TELEPHONE (OUTPATIENT)
Dept: CARDIOLOGY CLINIC | Facility: CLINIC | Age: 80
End: 2020-11-22

## 2020-11-23 DIAGNOSIS — I48.0 PAF (PAROXYSMAL ATRIAL FIBRILLATION) (HCC): ICD-10-CM

## 2020-11-23 RX ORDER — DIGOXIN 125 MCG
125 TABLET ORAL EVERY OTHER DAY
Qty: 45 TABLET | Refills: 3 | Status: SHIPPED | OUTPATIENT
Start: 2020-11-23 | End: 2021-01-01 | Stop reason: SDUPTHER

## 2020-12-23 PROBLEM — Z93.1 S/P PERCUTANEOUS ENDOSCOPIC GASTROSTOMY (PEG) TUBE PLACEMENT (HCC): Status: ACTIVE | Noted: 2018-08-21

## 2020-12-23 PROBLEM — R13.10 DYSPHAGIA: Status: ACTIVE | Noted: 2020-12-23

## 2021-01-01 ENCOUNTER — TELEPHONE (OUTPATIENT)
Dept: NEUROLOGY | Facility: CLINIC | Age: 81
End: 2021-01-01

## 2021-01-01 ENCOUNTER — TELEPHONE (OUTPATIENT)
Dept: CARDIOLOGY CLINIC | Facility: CLINIC | Age: 81
End: 2021-01-01

## 2021-01-01 ENCOUNTER — OFFICE VISIT (OUTPATIENT)
Dept: CARDIOLOGY CLINIC | Facility: CLINIC | Age: 81
End: 2021-01-01
Payer: MEDICARE

## 2021-01-01 ENCOUNTER — HOSPITAL ENCOUNTER (OUTPATIENT)
Facility: HOSPITAL | Age: 81
Setting detail: OBSERVATION
Discharge: HOME HEALTH | End: 2021-03-16
Attending: EMERGENCY MEDICINE | Admitting: STUDENT IN AN ORGANIZED HEALTH CARE EDUCATION/TRAINING PROGRAM
Payer: MEDICARE

## 2021-01-01 ENCOUNTER — HOSPITAL ENCOUNTER (OUTPATIENT)
Dept: NUCLEAR MEDICINE | Facility: HOSPITAL | Age: 81
Discharge: HOME | End: 2021-06-24
Payer: MEDICARE

## 2021-01-01 ENCOUNTER — ANESTHESIA (OUTPATIENT)
Dept: GASTROENTEROLOGY | Facility: HOSPITAL | Age: 81
End: 2021-01-01

## 2021-01-01 ENCOUNTER — TELEPHONE (OUTPATIENT)
Dept: GASTROENTEROLOGY | Facility: HOSPITAL | Age: 81
End: 2021-01-01

## 2021-01-01 ENCOUNTER — NURSE TRIAGE (OUTPATIENT)
Dept: OTHER | Facility: OTHER | Age: 81
End: 2021-01-01

## 2021-01-01 ENCOUNTER — REMOTE DEVICE CLINIC VISIT (OUTPATIENT)
Dept: CARDIOLOGY CLINIC | Facility: CLINIC | Age: 81
End: 2021-01-01
Payer: MEDICARE

## 2021-01-01 ENCOUNTER — APPOINTMENT (EMERGENCY)
Dept: RADIOLOGY | Facility: HOSPITAL | Age: 81
End: 2021-01-01
Payer: MEDICARE

## 2021-01-01 ENCOUNTER — OFFICE VISIT (OUTPATIENT)
Dept: NEUROLOGY | Facility: CLINIC | Age: 81
End: 2021-01-01
Payer: MEDICARE

## 2021-01-01 ENCOUNTER — APPOINTMENT (EMERGENCY)
Dept: RADIOLOGY | Facility: HOSPITAL | Age: 81
DRG: 309 | End: 2021-01-01
Payer: MEDICARE

## 2021-01-01 ENCOUNTER — HOSPITAL ENCOUNTER (OUTPATIENT)
Dept: GASTROENTEROLOGY | Facility: HOSPITAL | Age: 81
Setting detail: OUTPATIENT SURGERY
Discharge: HOME | End: 2021-12-17
Attending: INTERNAL MEDICINE | Admitting: INTERNAL MEDICINE
Payer: MEDICARE

## 2021-01-01 ENCOUNTER — HOSPITAL ENCOUNTER (OUTPATIENT)
Dept: GASTROENTEROLOGY | Facility: HOSPITAL | Age: 81
Setting detail: OUTPATIENT SURGERY
Discharge: HOME | End: 2021-01-15
Attending: INTERNAL MEDICINE | Admitting: INTERNAL MEDICINE
Payer: MEDICARE

## 2021-01-01 ENCOUNTER — ANESTHESIA EVENT (OUTPATIENT)
Dept: GASTROENTEROLOGY | Facility: HOSPITAL | Age: 81
End: 2021-01-01

## 2021-01-01 ENCOUNTER — IMMUNIZATIONS (OUTPATIENT)
Dept: FAMILY MEDICINE CLINIC | Facility: HOSPITAL | Age: 81
End: 2021-01-01

## 2021-01-01 ENCOUNTER — HOSPITAL ENCOUNTER (INPATIENT)
Facility: HOSPITAL | Age: 81
LOS: 1 days | Discharge: HOME | DRG: 309 | End: 2021-08-15
Attending: EMERGENCY MEDICINE | Admitting: STUDENT IN AN ORGANIZED HEALTH CARE EDUCATION/TRAINING PROGRAM
Payer: MEDICARE

## 2021-01-01 ENCOUNTER — APPOINTMENT (OUTPATIENT)
Dept: NON INVASIVE DIAGNOSTICS | Facility: HOSPITAL | Age: 81
End: 2021-01-01
Payer: MEDICARE

## 2021-01-01 ENCOUNTER — HOSPITAL ENCOUNTER (OUTPATIENT)
Dept: NON INVASIVE DIAGNOSTICS | Facility: HOSPITAL | Age: 81
Discharge: HOME | End: 2021-06-24
Payer: MEDICARE

## 2021-01-01 ENCOUNTER — TELEPHONE (OUTPATIENT)
Dept: NON INVASIVE DIAGNOSTICS | Facility: HOSPITAL | Age: 81
End: 2021-01-01

## 2021-01-01 VITALS
RESPIRATION RATE: 18 BRPM | TEMPERATURE: 97.8 F | SYSTOLIC BLOOD PRESSURE: 146 MMHG | DIASTOLIC BLOOD PRESSURE: 65 MMHG | WEIGHT: 118.17 LBS | BODY MASS INDEX: 19.66 KG/M2 | OXYGEN SATURATION: 98 % | HEART RATE: 77 BPM

## 2021-01-01 VITALS
HEART RATE: 76 BPM | SYSTOLIC BLOOD PRESSURE: 89 MMHG | BODY MASS INDEX: 19.99 KG/M2 | HEIGHT: 65 IN | WEIGHT: 120 LBS | DIASTOLIC BLOOD PRESSURE: 60 MMHG

## 2021-01-01 VITALS
DIASTOLIC BLOOD PRESSURE: 75 MMHG | OXYGEN SATURATION: 99 % | RESPIRATION RATE: 16 BRPM | HEIGHT: 65 IN | WEIGHT: 119 LBS | SYSTOLIC BLOOD PRESSURE: 146 MMHG | TEMPERATURE: 97.6 F | BODY MASS INDEX: 19.83 KG/M2 | HEART RATE: 71 BPM

## 2021-01-01 VITALS
DIASTOLIC BLOOD PRESSURE: 84 MMHG | HEIGHT: 65 IN | WEIGHT: 120 LBS | SYSTOLIC BLOOD PRESSURE: 122 MMHG | BODY MASS INDEX: 19.99 KG/M2 | HEART RATE: 82 BPM

## 2021-01-01 VITALS
OXYGEN SATURATION: 96 % | HEART RATE: 80 BPM | HEIGHT: 65 IN | SYSTOLIC BLOOD PRESSURE: 146 MMHG | WEIGHT: 118.9 LBS | BODY MASS INDEX: 19.81 KG/M2 | DIASTOLIC BLOOD PRESSURE: 76 MMHG

## 2021-01-01 VITALS
SYSTOLIC BLOOD PRESSURE: 155 MMHG | BODY MASS INDEX: 19.98 KG/M2 | WEIGHT: 119.9 LBS | HEIGHT: 65 IN | DIASTOLIC BLOOD PRESSURE: 68 MMHG | TEMPERATURE: 97.5 F | HEART RATE: 91 BPM

## 2021-01-01 VITALS
DIASTOLIC BLOOD PRESSURE: 70 MMHG | WEIGHT: 120 LBS | BODY MASS INDEX: 19.99 KG/M2 | SYSTOLIC BLOOD PRESSURE: 140 MMHG | HEART RATE: 67 BPM | HEIGHT: 65 IN

## 2021-01-01 VITALS
HEIGHT: 65 IN | BODY MASS INDEX: 20.41 KG/M2 | SYSTOLIC BLOOD PRESSURE: 94 MMHG | DIASTOLIC BLOOD PRESSURE: 54 MMHG | HEART RATE: 84 BPM | WEIGHT: 122.5 LBS | OXYGEN SATURATION: 97 %

## 2021-01-01 VITALS
BODY MASS INDEX: 19.97 KG/M2 | DIASTOLIC BLOOD PRESSURE: 80 MMHG | TEMPERATURE: 96.5 F | SYSTOLIC BLOOD PRESSURE: 150 MMHG | WEIGHT: 120 LBS | HEART RATE: 65 BPM

## 2021-01-01 VITALS
BODY MASS INDEX: 19.76 KG/M2 | DIASTOLIC BLOOD PRESSURE: 88 MMHG | WEIGHT: 118.6 LBS | SYSTOLIC BLOOD PRESSURE: 124 MMHG | HEIGHT: 65 IN | HEART RATE: 79 BPM

## 2021-01-01 VITALS — HEART RATE: 66 BPM

## 2021-01-01 VITALS
SYSTOLIC BLOOD PRESSURE: 144 MMHG | HEIGHT: 65 IN | WEIGHT: 116.7 LBS | BODY MASS INDEX: 19.44 KG/M2 | OXYGEN SATURATION: 99 % | DIASTOLIC BLOOD PRESSURE: 70 MMHG | HEART RATE: 87 BPM

## 2021-01-01 VITALS
DIASTOLIC BLOOD PRESSURE: 80 MMHG | HEART RATE: 87 BPM | WEIGHT: 115 LBS | BODY MASS INDEX: 19.16 KG/M2 | SYSTOLIC BLOOD PRESSURE: 156 MMHG | HEIGHT: 65 IN

## 2021-01-01 VITALS
RESPIRATION RATE: 18 BRPM | OXYGEN SATURATION: 98 % | HEIGHT: 65 IN | DIASTOLIC BLOOD PRESSURE: 63 MMHG | TEMPERATURE: 97.5 F | SYSTOLIC BLOOD PRESSURE: 123 MMHG | WEIGHT: 118.17 LBS | HEART RATE: 91 BPM | BODY MASS INDEX: 19.69 KG/M2

## 2021-01-01 VITALS
OXYGEN SATURATION: 98 % | TEMPERATURE: 98.9 F | HEART RATE: 80 BPM | RESPIRATION RATE: 17 BRPM | SYSTOLIC BLOOD PRESSURE: 152 MMHG | WEIGHT: 118 LBS | HEIGHT: 62 IN | BODY MASS INDEX: 21.71 KG/M2 | DIASTOLIC BLOOD PRESSURE: 75 MMHG

## 2021-01-01 DIAGNOSIS — Z92.3 HISTORY OF RADIATION TO HEAD AND NECK REGION: ICD-10-CM

## 2021-01-01 DIAGNOSIS — I48.0 PAROXYSMAL ATRIAL FIBRILLATION (HCC): ICD-10-CM

## 2021-01-01 DIAGNOSIS — R06.00 DYSPNEA ON EXERTION: ICD-10-CM

## 2021-01-01 DIAGNOSIS — I36.1 NONRHEUMATIC TRICUSPID VALVE REGURGITATION: ICD-10-CM

## 2021-01-01 DIAGNOSIS — R00.2 PALPITATIONS: ICD-10-CM

## 2021-01-01 DIAGNOSIS — I95.1 ORTHOSTATIC HYPOTENSION: Primary | ICD-10-CM

## 2021-01-01 DIAGNOSIS — G62.9 PERIPHERAL POLYNEUROPATHY: ICD-10-CM

## 2021-01-01 DIAGNOSIS — R13.12 OROPHARYNGEAL DYSPHAGIA: ICD-10-CM

## 2021-01-01 DIAGNOSIS — Z95.818 PRESENCE OF OTHER CARDIAC IMPLANTS AND GRAFTS: Primary | ICD-10-CM

## 2021-01-01 DIAGNOSIS — Z93.1 S/P PERCUTANEOUS ENDOSCOPIC GASTROSTOMY (PEG) TUBE PLACEMENT (HCC): ICD-10-CM

## 2021-01-01 DIAGNOSIS — I51.89 DIASTOLIC DYSFUNCTION WITHOUT HEART FAILURE: ICD-10-CM

## 2021-01-01 DIAGNOSIS — E78.5 DYSLIPIDEMIA: ICD-10-CM

## 2021-01-01 DIAGNOSIS — N39.0 UTI (URINARY TRACT INFECTION): ICD-10-CM

## 2021-01-01 DIAGNOSIS — I25.10 CORONARY ARTERY DISEASE INVOLVING NATIVE HEART WITHOUT ANGINA PECTORIS, UNSPECIFIED VESSEL OR LESION TYPE: Primary | ICD-10-CM

## 2021-01-01 DIAGNOSIS — G90.9 AUTONOMIC DYSFUNCTION: ICD-10-CM

## 2021-01-01 DIAGNOSIS — I95.1 ORTHOSTATIC HYPOTENSION: ICD-10-CM

## 2021-01-01 DIAGNOSIS — I49.9 SUPRAVENTRICULAR ARRHYTHMIA: ICD-10-CM

## 2021-01-01 DIAGNOSIS — R55 NEAR SYNCOPE: ICD-10-CM

## 2021-01-01 DIAGNOSIS — I10 HYPERTENSION, UNSPECIFIED TYPE: ICD-10-CM

## 2021-01-01 DIAGNOSIS — I25.10 CORONARY ARTERY DISEASE INVOLVING NATIVE CORONARY ARTERY OF NATIVE HEART WITHOUT ANGINA PECTORIS: ICD-10-CM

## 2021-01-01 DIAGNOSIS — E44.0 MODERATE PROTEIN-CALORIE MALNUTRITION (HCC): Primary | ICD-10-CM

## 2021-01-01 DIAGNOSIS — I31.3 PERICARDIAL EFFUSION: ICD-10-CM

## 2021-01-01 DIAGNOSIS — I47.1 SUPRAVENTRICULAR TACHYCARDIA (HCC): ICD-10-CM

## 2021-01-01 DIAGNOSIS — I48.0 PAF (PAROXYSMAL ATRIAL FIBRILLATION) (HCC): ICD-10-CM

## 2021-01-01 DIAGNOSIS — R76.8 VOLTAGE-GATED POTASSIUM CHANNEL (VGKC) ANTIBODY POSITIVE: ICD-10-CM

## 2021-01-01 DIAGNOSIS — R42 DIZZINESS: Primary | ICD-10-CM

## 2021-01-01 DIAGNOSIS — I48.0 PAROXYSMAL ATRIAL FIBRILLATION (HCC): Primary | ICD-10-CM

## 2021-01-01 DIAGNOSIS — I08.0 MITRAL AND AORTIC VALVE REGURGITATION: ICD-10-CM

## 2021-01-01 DIAGNOSIS — Z85.118 HISTORY OF LUNG CANCER IN ADULTHOOD: ICD-10-CM

## 2021-01-01 DIAGNOSIS — R20.0 NUMBNESS IN RIGHT LEG: Primary | ICD-10-CM

## 2021-01-01 DIAGNOSIS — I48.0 PAF (PAROXYSMAL ATRIAL FIBRILLATION) (HCC): Primary | ICD-10-CM

## 2021-01-01 DIAGNOSIS — M51.36 LUMBAR DEGENERATIVE DISC DISEASE: ICD-10-CM

## 2021-01-01 DIAGNOSIS — Z23 ENCOUNTER FOR IMMUNIZATION: Primary | ICD-10-CM

## 2021-01-01 DIAGNOSIS — R42 EPISODIC LIGHTHEADEDNESS: ICD-10-CM

## 2021-01-01 DIAGNOSIS — I49.9 SUPRAVENTRICULAR ARRHYTHMIA: Primary | ICD-10-CM

## 2021-01-01 DIAGNOSIS — I25.10 CORONARY ARTERY DISEASE INVOLVING NATIVE CORONARY ARTERY OF NATIVE HEART WITHOUT ANGINA PECTORIS: Primary | ICD-10-CM

## 2021-01-01 DIAGNOSIS — Z98.890 HISTORY OF LOOP RECORDER: ICD-10-CM

## 2021-01-01 DIAGNOSIS — R42 DIZZINESS: ICD-10-CM

## 2021-01-01 DIAGNOSIS — R00.0 TACHYCARDIA: ICD-10-CM

## 2021-01-01 DIAGNOSIS — Z23 ENCOUNTER FOR IMMUNIZATION: ICD-10-CM

## 2021-01-01 DIAGNOSIS — I47.1 PAROXYSMAL SVT (SUPRAVENTRICULAR TACHYCARDIA) (HCC): Primary | ICD-10-CM

## 2021-01-01 DIAGNOSIS — R42 LIGHTHEADEDNESS: ICD-10-CM

## 2021-01-01 LAB
ALBUMIN SERPL BCP-MCNC: 3.7 G/DL (ref 3.5–5)
ALBUMIN SERPL BCP-MCNC: 3.7 G/DL (ref 3.5–5)
ALP SERPL-CCNC: 102 U/L (ref 46–116)
ALP SERPL-CCNC: 107 U/L (ref 46–116)
ALT SERPL W P-5'-P-CCNC: 45 U/L (ref 12–78)
ALT SERPL W P-5'-P-CCNC: 6 U/L (ref 12–78)
ANION GAP SERPL CALCULATED.3IONS-SCNC: 11 MMOL/L (ref 4–13)
ANION GAP SERPL CALCULATED.3IONS-SCNC: 11 MMOL/L (ref 4–13)
ANION GAP SERPL CALCULATED.3IONS-SCNC: 6 MMOL/L (ref 4–13)
ANION GAP SERPL CALCULATED.3IONS-SCNC: 9 MMOL/L (ref 4–13)
APTT PPP: 31 SECONDS (ref 23–37)
AST SERPL W P-5'-P-CCNC: 25 U/L (ref 5–45)
AST SERPL W P-5'-P-CCNC: 28 U/L (ref 5–45)
ATRIAL RATE: 74 BPM
ATRIAL RATE: 78 BPM
BACTERIA UR CULT: ABNORMAL
BACTERIA UR QL AUTO: ABNORMAL /HPF
BASOPHILS # BLD AUTO: 0.01 THOUSANDS/ΜL (ref 0–0.1)
BASOPHILS # BLD AUTO: 0.05 THOUSANDS/ΜL (ref 0–0.1)
BASOPHILS NFR BLD AUTO: 0 % (ref 0–1)
BASOPHILS NFR BLD AUTO: 1 % (ref 0–1)
BILIRUB DIRECT SERPL-MCNC: 0.11 MG/DL (ref 0–0.2)
BILIRUB SERPL-MCNC: 0.39 MG/DL (ref 0.2–1)
BILIRUB SERPL-MCNC: 0.43 MG/DL (ref 0.2–1)
BILIRUB UR QL STRIP: NEGATIVE
BUN SERPL-MCNC: 32 MG/DL (ref 5–25)
BUN SERPL-MCNC: 35 MG/DL (ref 5–25)
BUN SERPL-MCNC: 44 MG/DL (ref 5–25)
BUN SERPL-MCNC: 49 MG/DL (ref 5–25)
CALCIUM SERPL-MCNC: 9.2 MG/DL (ref 8.3–10.1)
CALCIUM SERPL-MCNC: 9.4 MG/DL (ref 8.3–10.1)
CALCIUM SERPL-MCNC: 9.6 MG/DL (ref 8.3–10.1)
CALCIUM SERPL-MCNC: 9.7 MG/DL (ref 8.3–10.1)
CHEST PAIN STATEMENT: NORMAL
CHLORIDE SERPL-SCNC: 101 MMOL/L (ref 100–108)
CHLORIDE SERPL-SCNC: 102 MMOL/L (ref 100–108)
CHLORIDE SERPL-SCNC: 103 MMOL/L (ref 100–108)
CHLORIDE SERPL-SCNC: 103 MMOL/L (ref 100–108)
CLARITY UR: ABNORMAL
CO2 SERPL-SCNC: 26 MMOL/L (ref 21–32)
CO2 SERPL-SCNC: 27 MMOL/L (ref 21–32)
CO2 SERPL-SCNC: 29 MMOL/L (ref 21–32)
CO2 SERPL-SCNC: 30 MMOL/L (ref 21–32)
COLOR UR: YELLOW
CREAT SERPL-MCNC: 0.84 MG/DL (ref 0.6–1.3)
CREAT SERPL-MCNC: 0.85 MG/DL (ref 0.6–1.3)
CREAT SERPL-MCNC: 0.86 MG/DL (ref 0.6–1.3)
CREAT SERPL-MCNC: 1.18 MG/DL (ref 0.6–1.3)
DIGOXIN SERPL-MCNC: 0.9 NG/ML (ref 0.8–2)
DIGOXIN SERPL-MCNC: 1 NG/ML (ref 0.8–2)
EOSINOPHIL # BLD AUTO: 0.02 THOUSAND/ΜL (ref 0–0.61)
EOSINOPHIL # BLD AUTO: 0.23 THOUSAND/ΜL (ref 0–0.61)
EOSINOPHIL NFR BLD AUTO: 0 % (ref 0–6)
EOSINOPHIL NFR BLD AUTO: 3 % (ref 0–6)
ERYTHROCYTE [DISTWIDTH] IN BLOOD BY AUTOMATED COUNT: 11.9 % (ref 11.6–15.1)
ERYTHROCYTE [DISTWIDTH] IN BLOOD BY AUTOMATED COUNT: 12 % (ref 11.6–15.1)
ERYTHROCYTE [DISTWIDTH] IN BLOOD BY AUTOMATED COUNT: 12.6 % (ref 11.6–15.1)
ERYTHROCYTE [DISTWIDTH] IN BLOOD BY AUTOMATED COUNT: 12.7 % (ref 11.6–15.1)
GFR SERPL CREATININE-BSD FRML MDRD: 43 ML/MIN/1.73SQ M
GFR SERPL CREATININE-BSD FRML MDRD: 63 ML/MIN/1.73SQ M
GFR SERPL CREATININE-BSD FRML MDRD: 64 ML/MIN/1.73SQ M
GFR SERPL CREATININE-BSD FRML MDRD: 64 ML/MIN/1.73SQ M
GLUCOSE P FAST SERPL-MCNC: 93 MG/DL (ref 65–99)
GLUCOSE SERPL-MCNC: 89 MG/DL (ref 65–140)
GLUCOSE SERPL-MCNC: 93 MG/DL (ref 65–140)
GLUCOSE SERPL-MCNC: 97 MG/DL (ref 65–140)
GLUCOSE SERPL-MCNC: 99 MG/DL (ref 65–140)
GLUCOSE UR STRIP-MCNC: NEGATIVE MG/DL
HCT VFR BLD AUTO: 35.4 % (ref 34.8–46.1)
HCT VFR BLD AUTO: 39.3 % (ref 34.8–46.1)
HCT VFR BLD AUTO: 39.9 % (ref 34.8–46.1)
HCT VFR BLD AUTO: 41 % (ref 34.8–46.1)
HGB BLD-MCNC: 11.4 G/DL (ref 11.5–15.4)
HGB BLD-MCNC: 12.5 G/DL (ref 11.5–15.4)
HGB BLD-MCNC: 13.1 G/DL (ref 11.5–15.4)
HGB BLD-MCNC: 13.3 G/DL (ref 11.5–15.4)
HGB UR QL STRIP.AUTO: ABNORMAL
IMM GRANULOCYTES # BLD AUTO: 0.02 THOUSAND/UL (ref 0–0.2)
IMM GRANULOCYTES # BLD AUTO: 0.17 THOUSAND/UL (ref 0–0.2)
IMM GRANULOCYTES NFR BLD AUTO: 0 % (ref 0–2)
IMM GRANULOCYTES NFR BLD AUTO: 1 % (ref 0–2)
INR PPP: 1.21 (ref 0.84–1.19)
KETONES UR STRIP-MCNC: NEGATIVE MG/DL
LEUKOCYTE ESTERASE UR QL STRIP: ABNORMAL
LYMPHOCYTES # BLD AUTO: 0.83 THOUSANDS/ΜL (ref 0.6–4.47)
LYMPHOCYTES # BLD AUTO: 1.32 THOUSANDS/ΜL (ref 0.6–4.47)
LYMPHOCYTES NFR BLD AUTO: 15 % (ref 14–44)
LYMPHOCYTES NFR BLD AUTO: 7 % (ref 14–44)
MAGNESIUM SERPL-MCNC: 2.3 MG/DL (ref 1.6–2.6)
MAGNESIUM SERPL-MCNC: 2.5 MG/DL (ref 1.6–2.6)
MAX DIASTOLIC BP: 102 MMHG
MAX HEART RATE: 96 BPM
MAX PREDICTED HEART RATE: 137 BPM
MAX. SYSTOLIC BP: 178 MMHG
MCH RBC QN AUTO: 32.5 PG (ref 26.8–34.3)
MCH RBC QN AUTO: 32.5 PG (ref 26.8–34.3)
MCH RBC QN AUTO: 33.1 PG (ref 26.8–34.3)
MCH RBC QN AUTO: 33.3 PG (ref 26.8–34.3)
MCHC RBC AUTO-ENTMCNC: 31.8 G/DL (ref 31.4–37.4)
MCHC RBC AUTO-ENTMCNC: 32.2 G/DL (ref 31.4–37.4)
MCHC RBC AUTO-ENTMCNC: 32.4 G/DL (ref 31.4–37.4)
MCHC RBC AUTO-ENTMCNC: 32.8 G/DL (ref 31.4–37.4)
MCV RBC AUTO: 101 FL (ref 82–98)
MCV RBC AUTO: 101 FL (ref 82–98)
MCV RBC AUTO: 102 FL (ref 82–98)
MCV RBC AUTO: 103 FL (ref 82–98)
MONOCYTES # BLD AUTO: 0.87 THOUSAND/ΜL (ref 0.17–1.22)
MONOCYTES # BLD AUTO: 1.47 THOUSAND/ΜL (ref 0.17–1.22)
MONOCYTES NFR BLD AUTO: 10 % (ref 4–12)
MONOCYTES NFR BLD AUTO: 12 % (ref 4–12)
NEUTROPHILS # BLD AUTO: 6.36 THOUSANDS/ΜL (ref 1.85–7.62)
NEUTROPHILS # BLD AUTO: 9.57 THOUSANDS/ΜL (ref 1.85–7.62)
NEUTS SEG NFR BLD AUTO: 71 % (ref 43–75)
NEUTS SEG NFR BLD AUTO: 80 % (ref 43–75)
NITRITE UR QL STRIP: POSITIVE
NON-SQ EPI CELLS URNS QL MICRO: ABNORMAL /HPF
NRBC BLD AUTO-RTO: 0 /100 WBCS
NRBC BLD AUTO-RTO: 0 /100 WBCS
NT-PROBNP SERPL-MCNC: 1119 PG/ML
NT-PROBNP SERPL-MCNC: 998 PG/ML
P AXIS: 68 DEGREES
P AXIS: 77 DEGREES
PH UR STRIP.AUTO: 6.5 [PH]
PLATELET # BLD AUTO: 258 THOUSANDS/UL (ref 149–390)
PLATELET # BLD AUTO: 265 THOUSANDS/UL (ref 149–390)
PLATELET # BLD AUTO: 312 THOUSANDS/UL (ref 149–390)
PLATELET # BLD AUTO: 328 THOUSANDS/UL (ref 149–390)
PMV BLD AUTO: 10.2 FL (ref 8.9–12.7)
PMV BLD AUTO: 10.4 FL (ref 8.9–12.7)
PMV BLD AUTO: 10.4 FL (ref 8.9–12.7)
PMV BLD AUTO: 9.7 FL (ref 8.9–12.7)
POTASSIUM SERPL-SCNC: 4 MMOL/L (ref 3.5–5.3)
POTASSIUM SERPL-SCNC: 4.2 MMOL/L (ref 3.5–5.3)
POTASSIUM SERPL-SCNC: 4.3 MMOL/L (ref 3.5–5.3)
POTASSIUM SERPL-SCNC: 4.5 MMOL/L (ref 3.5–5.3)
PR INTERVAL: 124 MS
PR INTERVAL: 160 MS
PROT SERPL-MCNC: 7.1 G/DL (ref 6.4–8.2)
PROT SERPL-MCNC: 7.3 G/DL (ref 6.4–8.2)
PROT UR STRIP-MCNC: ABNORMAL MG/DL
PROTHROMBIN TIME: 15 SECONDS (ref 11.6–14.5)
PROTOCOL NAME: NORMAL
QRS AXIS: 49 DEGREES
QRS AXIS: 9 DEGREES
QRSD INTERVAL: 68 MS
QRSD INTERVAL: 68 MS
QT INTERVAL: 360 MS
QT INTERVAL: 364 MS
QTC INTERVAL: 410 MS
QTC INTERVAL: 481 MS
RBC # BLD AUTO: 3.51 MILLION/UL (ref 3.81–5.12)
RBC # BLD AUTO: 3.85 MILLION/UL (ref 3.81–5.12)
RBC # BLD AUTO: 3.96 MILLION/UL (ref 3.81–5.12)
RBC # BLD AUTO: 4 MILLION/UL (ref 3.81–5.12)
RBC #/AREA URNS AUTO: ABNORMAL /HPF
REASON FOR TERMINATION: NORMAL
SODIUM SERPL-SCNC: 138 MMOL/L (ref 136–145)
SODIUM SERPL-SCNC: 140 MMOL/L (ref 136–145)
SP GR UR STRIP.AUTO: 1.01 (ref 1–1.03)
T WAVE AXIS: 34 DEGREES
T WAVE AXIS: 62 DEGREES
TARGET HR FORMULA: NORMAL
TIME IN EXERCISE PHASE: NORMAL
TROPONIN I SERPL-MCNC: <0.02 NG/ML
TROPONIN I SERPL-MCNC: <0.02 NG/ML
TSH SERPL DL<=0.05 MIU/L-ACNC: 0.92 UIU/ML (ref 0.36–3.74)
TSH SERPL DL<=0.05 MIU/L-ACNC: 1.48 UIU/ML (ref 0.36–3.74)
UROBILINOGEN UR QL STRIP.AUTO: 0.2 E.U./DL
VENTRICULAR RATE: 105 BPM
VENTRICULAR RATE: 78 BPM
WBC # BLD AUTO: 11.17 THOUSAND/UL (ref 4.31–10.16)
WBC # BLD AUTO: 12.07 THOUSAND/UL (ref 4.31–10.16)
WBC # BLD AUTO: 6.62 THOUSAND/UL (ref 4.31–10.16)
WBC # BLD AUTO: 8.85 THOUSAND/UL (ref 4.31–10.16)
WBC #/AREA URNS AUTO: ABNORMAL /HPF

## 2021-01-01 PROCEDURE — 93298 REM INTERROG DEV EVAL SCRMS: CPT | Performed by: INTERNAL MEDICINE

## 2021-01-01 PROCEDURE — G2066 INTER DEVC REMOTE 30D: HCPCS | Performed by: INTERNAL MEDICINE

## 2021-01-01 PROCEDURE — 99215 OFFICE O/P EST HI 40 MIN: CPT | Performed by: NURSE PRACTITIONER

## 2021-01-01 PROCEDURE — 0012A SARS-COV-2 / COVID-19 MRNA VACCINE (MODERNA) 100 MCG: CPT

## 2021-01-01 PROCEDURE — 99214 OFFICE O/P EST MOD 30 MIN: CPT | Performed by: INTERNAL MEDICINE

## 2021-01-01 PROCEDURE — 93005 ELECTROCARDIOGRAM TRACING: CPT

## 2021-01-01 PROCEDURE — 97163 PT EVAL HIGH COMPLEX 45 MIN: CPT

## 2021-01-01 PROCEDURE — 84484 ASSAY OF TROPONIN QUANT: CPT | Performed by: EMERGENCY MEDICINE

## 2021-01-01 PROCEDURE — 99285 EMERGENCY DEPT VISIT HI MDM: CPT | Performed by: EMERGENCY MEDICINE

## 2021-01-01 PROCEDURE — 93017 CV STRESS TEST TRACING ONLY: CPT

## 2021-01-01 PROCEDURE — 99214 OFFICE O/P EST MOD 30 MIN: CPT | Performed by: NURSE PRACTITIONER

## 2021-01-01 PROCEDURE — 99220 PR INITIAL OBSERVATION CARE/DAY 70 MINUTES: CPT | Performed by: STUDENT IN AN ORGANIZED HEALTH CARE EDUCATION/TRAINING PROGRAM

## 2021-01-01 PROCEDURE — 93306 TTE W/DOPPLER COMPLETE: CPT

## 2021-01-01 PROCEDURE — 97166 OT EVAL MOD COMPLEX 45 MIN: CPT

## 2021-01-01 PROCEDURE — 80048 BASIC METABOLIC PNL TOTAL CA: CPT | Performed by: STUDENT IN AN ORGANIZED HEALTH CARE EDUCATION/TRAINING PROGRAM

## 2021-01-01 PROCEDURE — 80048 BASIC METABOLIC PNL TOTAL CA: CPT | Performed by: EMERGENCY MEDICINE

## 2021-01-01 PROCEDURE — 91301 SARS-COV-2 / COVID-19 MRNA VACCINE (MODERNA) 100 MCG: CPT

## 2021-01-01 PROCEDURE — 71046 X-RAY EXAM CHEST 2 VIEWS: CPT

## 2021-01-01 PROCEDURE — 87077 CULTURE AEROBIC IDENTIFY: CPT | Performed by: EMERGENCY MEDICINE

## 2021-01-01 PROCEDURE — 36415 COLL VENOUS BLD VENIPUNCTURE: CPT | Performed by: EMERGENCY MEDICINE

## 2021-01-01 PROCEDURE — 87086 URINE CULTURE/COLONY COUNT: CPT | Performed by: EMERGENCY MEDICINE

## 2021-01-01 PROCEDURE — 87186 SC STD MICRODIL/AGAR DIL: CPT | Performed by: EMERGENCY MEDICINE

## 2021-01-01 PROCEDURE — 85025 COMPLETE CBC W/AUTO DIFF WBC: CPT | Performed by: EMERGENCY MEDICINE

## 2021-01-01 PROCEDURE — 85730 THROMBOPLASTIN TIME PARTIAL: CPT | Performed by: EMERGENCY MEDICINE

## 2021-01-01 PROCEDURE — 99232 SBSQ HOSP IP/OBS MODERATE 35: CPT | Performed by: STUDENT IN AN ORGANIZED HEALTH CARE EDUCATION/TRAINING PROGRAM

## 2021-01-01 PROCEDURE — 93000 ELECTROCARDIOGRAM COMPLETE: CPT | Performed by: INTERNAL MEDICINE

## 2021-01-01 PROCEDURE — 93010 ELECTROCARDIOGRAM REPORT: CPT

## 2021-01-01 PROCEDURE — 83735 ASSAY OF MAGNESIUM: CPT | Performed by: EMERGENCY MEDICINE

## 2021-01-01 PROCEDURE — 99205 OFFICE O/P NEW HI 60 MIN: CPT | Performed by: INTERNAL MEDICINE

## 2021-01-01 PROCEDURE — 99213 OFFICE O/P EST LOW 20 MIN: CPT | Performed by: INTERNAL MEDICINE

## 2021-01-01 PROCEDURE — 85027 COMPLETE CBC AUTOMATED: CPT | Performed by: INTERNAL MEDICINE

## 2021-01-01 PROCEDURE — 43246 EGD PLACE GASTROSTOMY TUBE: CPT | Performed by: INTERNAL MEDICINE

## 2021-01-01 PROCEDURE — 80076 HEPATIC FUNCTION PANEL: CPT | Performed by: EMERGENCY MEDICINE

## 2021-01-01 PROCEDURE — 80053 COMPREHEN METABOLIC PANEL: CPT | Performed by: EMERGENCY MEDICINE

## 2021-01-01 PROCEDURE — 78452 HT MUSCLE IMAGE SPECT MULT: CPT

## 2021-01-01 PROCEDURE — 99239 HOSP IP/OBS DSCHRG MGMT >30: CPT | Performed by: STUDENT IN AN ORGANIZED HEALTH CARE EDUCATION/TRAINING PROGRAM

## 2021-01-01 PROCEDURE — 71045 X-RAY EXAM CHEST 1 VIEW: CPT

## 2021-01-01 PROCEDURE — G1004 CDSM NDSC: HCPCS

## 2021-01-01 PROCEDURE — 99285 EMERGENCY DEPT VISIT HI MDM: CPT

## 2021-01-01 PROCEDURE — 85610 PROTHROMBIN TIME: CPT | Performed by: EMERGENCY MEDICINE

## 2021-01-01 PROCEDURE — 80162 ASSAY OF DIGOXIN TOTAL: CPT | Performed by: PHYSICIAN ASSISTANT

## 2021-01-01 PROCEDURE — 84443 ASSAY THYROID STIM HORMONE: CPT | Performed by: EMERGENCY MEDICINE

## 2021-01-01 PROCEDURE — 1123F ACP DISCUSS/DSCN MKR DOCD: CPT

## 2021-01-01 PROCEDURE — 80048 BASIC METABOLIC PNL TOTAL CA: CPT | Performed by: INTERNAL MEDICINE

## 2021-01-01 PROCEDURE — 0011A SARS-COV-2 / COVID-19 MRNA VACCINE (MODERNA) 100 MCG: CPT

## 2021-01-01 PROCEDURE — 93018 CV STRESS TEST I&R ONLY: CPT | Performed by: INTERNAL MEDICINE

## 2021-01-01 PROCEDURE — 83880 ASSAY OF NATRIURETIC PEPTIDE: CPT | Performed by: EMERGENCY MEDICINE

## 2021-01-01 PROCEDURE — 93010 ELECTROCARDIOGRAM REPORT: CPT | Performed by: INTERNAL MEDICINE

## 2021-01-01 PROCEDURE — 78452 HT MUSCLE IMAGE SPECT MULT: CPT | Performed by: INTERNAL MEDICINE

## 2021-01-01 PROCEDURE — 99205 OFFICE O/P NEW HI 60 MIN: CPT | Performed by: PSYCHIATRY & NEUROLOGY

## 2021-01-01 PROCEDURE — 99214 OFFICE O/P EST MOD 30 MIN: CPT

## 2021-01-01 PROCEDURE — 85027 COMPLETE CBC AUTOMATED: CPT | Performed by: STUDENT IN AN ORGANIZED HEALTH CARE EDUCATION/TRAINING PROGRAM

## 2021-01-01 PROCEDURE — 99220 PR INITIAL OBSERVATION CARE/DAY 70 MINUTES: CPT | Performed by: INTERNAL MEDICINE

## 2021-01-01 PROCEDURE — 80162 ASSAY OF DIGOXIN TOTAL: CPT | Performed by: EMERGENCY MEDICINE

## 2021-01-01 PROCEDURE — 99239 HOSP IP/OBS DSCHRG MGMT >30: CPT | Performed by: PHYSICIAN ASSISTANT

## 2021-01-01 PROCEDURE — A9502 TC99M TETROFOSMIN: HCPCS

## 2021-01-01 PROCEDURE — 93016 CV STRESS TEST SUPVJ ONLY: CPT | Performed by: INTERNAL MEDICINE

## 2021-01-01 PROCEDURE — 81001 URINALYSIS AUTO W/SCOPE: CPT | Performed by: EMERGENCY MEDICINE

## 2021-01-01 RX ORDER — DIGOXIN 125 MCG
125 TABLET ORAL EVERY OTHER DAY
Status: DISCONTINUED | OUTPATIENT
Start: 2021-01-01 | End: 2021-01-01 | Stop reason: HOSPADM

## 2021-01-01 RX ORDER — LEVOTHYROXINE SODIUM 112 UG/1
112 TABLET ORAL
Status: DISCONTINUED | OUTPATIENT
Start: 2021-01-01 | End: 2021-01-01 | Stop reason: HOSPADM

## 2021-01-01 RX ORDER — ONDANSETRON 2 MG/ML
INJECTION INTRAMUSCULAR; INTRAVENOUS AS NEEDED
Status: DISCONTINUED | OUTPATIENT
Start: 2021-01-01 | End: 2021-01-01

## 2021-01-01 RX ORDER — ACETAMINOPHEN 160 MG/5ML
500 SUSPENSION, ORAL (FINAL DOSE FORM) ORAL ONCE AS NEEDED
Status: CANCELLED | OUTPATIENT
Start: 2021-01-01

## 2021-01-01 RX ORDER — ONDANSETRON 2 MG/ML
4 INJECTION INTRAMUSCULAR; INTRAVENOUS EVERY 6 HOURS PRN
Status: DISCONTINUED | OUTPATIENT
Start: 2021-01-01 | End: 2021-01-01 | Stop reason: HOSPADM

## 2021-01-01 RX ORDER — ATORVASTATIN CALCIUM 10 MG/1
20 TABLET, FILM COATED ORAL
Status: DISCONTINUED | OUTPATIENT
Start: 2021-01-01 | End: 2021-01-01 | Stop reason: HOSPADM

## 2021-01-01 RX ORDER — DIPHENOXYLATE HYDROCHLORIDE AND ATROPINE SULFATE 2.5; .025 MG/1; MG/1
1 TABLET ORAL DAILY
COMMUNITY

## 2021-01-01 RX ORDER — BISOPROLOL FUMARATE 5 MG/1
2.5 TABLET ORAL DAILY
Qty: 15 TABLET | Refills: 3 | Status: SHIPPED | OUTPATIENT
Start: 2021-01-01 | End: 2021-01-01 | Stop reason: HOSPADM

## 2021-01-01 RX ORDER — AMOXICILLIN AND CLAVULANATE POTASSIUM 875; 125 MG/1; MG/1
1 TABLET, FILM COATED ORAL EVERY 12 HOURS SCHEDULED
Qty: 10 TABLET | Refills: 0 | Status: SHIPPED | OUTPATIENT
Start: 2021-01-01 | End: 2021-01-01

## 2021-01-01 RX ORDER — LIDOCAINE HYDROCHLORIDE 20 MG/ML
INJECTION, SOLUTION EPIDURAL; INFILTRATION; INTRACAUDAL; PERINEURAL AS NEEDED
Status: DISCONTINUED | OUTPATIENT
Start: 2021-01-01 | End: 2021-01-01

## 2021-01-01 RX ORDER — ACETAMINOPHEN 325 MG/1
650 TABLET ORAL EVERY 6 HOURS PRN
Status: DISCONTINUED | OUTPATIENT
Start: 2021-01-01 | End: 2021-01-01 | Stop reason: HOSPADM

## 2021-01-01 RX ORDER — SODIUM CHLORIDE 9 MG/ML
125 INJECTION, SOLUTION INTRAVENOUS CONTINUOUS
Status: DISCONTINUED | OUTPATIENT
Start: 2021-01-01 | End: 2021-01-01 | Stop reason: HOSPADM

## 2021-01-01 RX ORDER — ACETAMINOPHEN 160 MG/5ML
500 SUSPENSION, ORAL (FINAL DOSE FORM) ORAL ONCE
Status: COMPLETED | OUTPATIENT
Start: 2021-01-01 | End: 2021-01-01

## 2021-01-01 RX ORDER — MIDODRINE HYDROCHLORIDE 5 MG/1
5 TABLET ORAL 3 TIMES DAILY
Qty: 90 TABLET | Refills: 0 | Status: SHIPPED | OUTPATIENT
Start: 2021-01-01 | End: 2021-01-01

## 2021-01-01 RX ORDER — MIDODRINE HYDROCHLORIDE 2.5 MG/1
2.5 TABLET ORAL 3 TIMES DAILY
Status: DISCONTINUED | OUTPATIENT
Start: 2021-01-01 | End: 2021-01-01

## 2021-01-01 RX ORDER — DIGOXIN 125 MCG
125 TABLET ORAL EVERY OTHER DAY
Qty: 45 TABLET | Refills: 3 | Status: SHIPPED | OUTPATIENT
Start: 2021-01-01

## 2021-01-01 RX ORDER — PROPOFOL 10 MG/ML
INJECTION, EMULSION INTRAVENOUS AS NEEDED
Status: DISCONTINUED | OUTPATIENT
Start: 2021-01-01 | End: 2021-01-01

## 2021-01-01 RX ORDER — CEPHALEXIN 250 MG/1
500 CAPSULE ORAL ONCE
Status: DISCONTINUED | OUTPATIENT
Start: 2021-01-01 | End: 2021-01-01

## 2021-01-01 RX ORDER — CEFAZOLIN SODIUM 1 G/50ML
1000 SOLUTION INTRAVENOUS ONCE
Status: COMPLETED | OUTPATIENT
Start: 2021-01-01 | End: 2021-01-01

## 2021-01-01 RX ORDER — GINSENG 100 MG
1 CAPSULE ORAL 2 TIMES DAILY PRN
Status: DISCONTINUED | OUTPATIENT
Start: 2021-01-01 | End: 2021-01-01 | Stop reason: HOSPADM

## 2021-01-01 RX ORDER — ATENOLOL 25 MG/1
12.5 TABLET ORAL DAILY
Status: DISCONTINUED | OUTPATIENT
Start: 2021-01-01 | End: 2021-01-01 | Stop reason: HOSPADM

## 2021-01-01 RX ORDER — MIDODRINE HYDROCHLORIDE 5 MG/1
5 TABLET ORAL 3 TIMES DAILY
Status: DISCONTINUED | OUTPATIENT
Start: 2021-01-01 | End: 2021-01-01 | Stop reason: HOSPADM

## 2021-01-01 RX ORDER — LEVALBUTEROL 1.25 MG/.5ML
1.25 SOLUTION, CONCENTRATE RESPIRATORY (INHALATION) EVERY 8 HOURS PRN
Status: DISCONTINUED | OUTPATIENT
Start: 2021-01-01 | End: 2021-01-01 | Stop reason: HOSPADM

## 2021-01-01 RX ORDER — CLINDAMYCIN PHOSPHATE 600 MG/50ML
600 INJECTION INTRAVENOUS EVERY 8 HOURS
Status: DISCONTINUED | OUTPATIENT
Start: 2021-01-01 | End: 2021-01-01 | Stop reason: HOSPADM

## 2021-01-01 RX ORDER — HYDRALAZINE HYDROCHLORIDE 20 MG/ML
10 INJECTION INTRAMUSCULAR; INTRAVENOUS EVERY 6 HOURS PRN
Status: DISCONTINUED | OUTPATIENT
Start: 2021-01-01 | End: 2021-01-01 | Stop reason: HOSPADM

## 2021-01-01 RX ORDER — MIDODRINE HYDROCHLORIDE 2.5 MG/1
2.5 TABLET ORAL 3 TIMES DAILY
Qty: 90 TABLET | Refills: 1 | Status: SHIPPED | OUTPATIENT
Start: 2021-01-01 | End: 2021-01-01 | Stop reason: HOSPADM

## 2021-01-01 RX ORDER — MULTIVIT WITH MINERALS/LUTEIN
2000 TABLET ORAL DAILY
COMMUNITY

## 2021-01-01 RX ORDER — CLINDAMYCIN PHOSPHATE 600 MG/50ML
600 INJECTION INTRAVENOUS ONCE
Status: DISCONTINUED | OUTPATIENT
Start: 2021-01-01 | End: 2021-01-01 | Stop reason: HOSPADM

## 2021-01-01 RX ADMIN — PROPOFOL 50 MG: 10 INJECTION, EMULSION INTRAVENOUS at 14:37

## 2021-01-01 RX ADMIN — PROPOFOL 80 MG: 10 INJECTION, EMULSION INTRAVENOUS at 14:33

## 2021-01-01 RX ADMIN — LEVOTHYROXINE SODIUM 112 MCG: 112 TABLET ORAL at 05:25

## 2021-01-01 RX ADMIN — PROPOFOL 100 MG: 10 INJECTION, EMULSION INTRAVENOUS at 10:23

## 2021-01-01 RX ADMIN — APIXABAN 2.5 MG: 2.5 TABLET, FILM COATED ORAL at 17:19

## 2021-01-01 RX ADMIN — MIDODRINE HYDROCHLORIDE 2.5 MG: 2.5 TABLET ORAL at 15:21

## 2021-01-01 RX ADMIN — CEFTRIAXONE SODIUM 1000 MG: 10 INJECTION, POWDER, FOR SOLUTION INTRAVENOUS at 15:14

## 2021-01-01 RX ADMIN — REGADENOSON 0.4 MG: 0.08 INJECTION, SOLUTION INTRAVENOUS at 10:34

## 2021-01-01 RX ADMIN — CEFAZOLIN SODIUM 1000 MG: 1 SOLUTION INTRAVENOUS at 10:17

## 2021-01-01 RX ADMIN — CLINDAMYCIN IN 5 PERCENT DEXTROSE 600 MG: 12 INJECTION, SOLUTION INTRAVENOUS at 14:11

## 2021-01-01 RX ADMIN — PROPOFOL 50 MG: 10 INJECTION, EMULSION INTRAVENOUS at 10:34

## 2021-01-01 RX ADMIN — PROPOFOL 50 MG: 10 INJECTION, EMULSION INTRAVENOUS at 10:27

## 2021-01-01 RX ADMIN — SODIUM CHLORIDE 125 ML/HR: 0.9 INJECTION, SOLUTION INTRAVENOUS at 14:14

## 2021-01-01 RX ADMIN — APIXABAN 2.5 MG: 2.5 TABLET, FILM COATED ORAL at 10:15

## 2021-01-01 RX ADMIN — PROPOFOL 50 MG: 10 INJECTION, EMULSION INTRAVENOUS at 14:41

## 2021-01-01 RX ADMIN — PROPOFOL 50 MG: 10 INJECTION, EMULSION INTRAVENOUS at 10:30

## 2021-01-01 RX ADMIN — DIGOXIN 125 MCG: 125 TABLET ORAL at 09:19

## 2021-01-01 RX ADMIN — ACETAMINOPHEN 650 MG: 325 TABLET, FILM COATED ORAL at 05:48

## 2021-01-01 RX ADMIN — CEFTRIAXONE SODIUM 1000 MG: 10 INJECTION, POWDER, FOR SOLUTION INTRAVENOUS at 13:26

## 2021-01-01 RX ADMIN — LIDOCAINE HYDROCHLORIDE 40 MG: 20 INJECTION, SOLUTION EPIDURAL; INFILTRATION; INTRACAUDAL; PERINEURAL at 10:23

## 2021-01-01 RX ADMIN — ONDANSETRON 4 MG: 2 INJECTION INTRAMUSCULAR; INTRAVENOUS at 14:16

## 2021-01-01 RX ADMIN — ACETAMINOPHEN 650 MG: 325 TABLET ORAL at 06:10

## 2021-01-01 RX ADMIN — DIGOXIN 125 MCG: 125 TABLET ORAL at 08:41

## 2021-01-01 RX ADMIN — APIXABAN 2.5 MG: 2.5 TABLET, FILM COATED ORAL at 15:21

## 2021-01-01 RX ADMIN — LEVOTHYROXINE SODIUM 112 MCG: 112 TABLET ORAL at 05:47

## 2021-01-01 RX ADMIN — APIXABAN 2.5 MG: 2.5 TABLET, FILM COATED ORAL at 08:41

## 2021-01-01 RX ADMIN — APIXABAN 2.5 MG: 2.5 TABLET, FILM COATED ORAL at 09:54

## 2021-01-01 RX ADMIN — ACETAMINOPHEN 500 MG: 160 SUSPENSION ORAL at 11:33

## 2021-01-01 RX ADMIN — ACETAMINOPHEN 650 MG: 325 TABLET, FILM COATED ORAL at 20:37

## 2021-01-01 RX ADMIN — HYDRALAZINE HYDROCHLORIDE 10 MG: 20 INJECTION, SOLUTION INTRAMUSCULAR; INTRAVENOUS at 21:20

## 2021-01-01 RX ADMIN — MIDODRINE HYDROCHLORIDE 5 MG: 5 TABLET ORAL at 16:21

## 2021-01-01 RX ADMIN — MIDODRINE HYDROCHLORIDE 5 MG: 5 TABLET ORAL at 08:41

## 2021-01-01 RX ADMIN — SODIUM CHLORIDE 125 ML/HR: 0.9 INJECTION, SOLUTION INTRAVENOUS at 09:49

## 2021-01-01 RX ADMIN — BACITRACIN 1 SMALL APPLICATION: 500 OINTMENT TOPICAL at 18:19

## 2021-01-01 RX ADMIN — DIGOXIN 125 MCG: 125 TABLET ORAL at 15:21

## 2021-01-01 RX ADMIN — ONDANSETRON 4 MG: 2 INJECTION INTRAMUSCULAR; INTRAVENOUS at 09:51

## 2021-01-01 RX ADMIN — MIDODRINE HYDROCHLORIDE 2.5 MG: 2.5 TABLET ORAL at 09:54

## 2021-01-01 RX ADMIN — ATORVASTATIN CALCIUM 20 MG: 10 TABLET, FILM COATED ORAL at 16:56

## 2021-01-01 RX ADMIN — LEVOTHYROXINE SODIUM 112 MCG: 112 TABLET ORAL at 06:10

## 2021-01-01 RX ADMIN — ACETAMINOPHEN 650 MG: 325 TABLET, FILM COATED ORAL at 16:56

## 2021-01-01 RX ADMIN — ATORVASTATIN CALCIUM 20 MG: 10 TABLET, FILM COATED ORAL at 16:21

## 2021-01-11 RX ORDER — SODIUM CHLORIDE 9 MG/ML
125 INJECTION, SOLUTION INTRAVENOUS CONTINUOUS
Status: CANCELLED | OUTPATIENT
Start: 2021-01-11

## 2021-01-12 ENCOUNTER — HOSPITAL ENCOUNTER (OUTPATIENT)
Dept: GASTROENTEROLOGY | Facility: HOSPITAL | Age: 81
Setting detail: OUTPATIENT SURGERY
Discharge: HOME | End: 2021-01-12
Attending: INTERNAL MEDICINE

## 2021-01-13 ENCOUNTER — REMOTE DEVICE CLINIC VISIT (OUTPATIENT)
Dept: CARDIOLOGY CLINIC | Facility: CLINIC | Age: 81
End: 2021-01-13
Payer: MEDICARE

## 2021-01-13 DIAGNOSIS — Z95.818 PRESENCE OF OTHER CARDIAC IMPLANTS AND GRAFTS: Primary | ICD-10-CM

## 2021-01-13 PROCEDURE — G2066 INTER DEVC REMOTE 30D: HCPCS | Performed by: INTERNAL MEDICINE

## 2021-01-13 PROCEDURE — 93298 REM INTERROG DEV EVAL SCRMS: CPT | Performed by: INTERNAL MEDICINE

## 2021-01-13 NOTE — PROGRESS NOTES
CARELINK TRANSMISSION: LOOP RECORDER  PRESENTING RHYTHM NSR @ 79 BPM BATTERY STATUS "OK"  [address] WAVE OVERSENSING  NO PATIENT ACTIVATED EPISODES  NORMAL DEVICE FUNCTION   DL

## 2021-01-14 ENCOUNTER — ANESTHESIA EVENT (OUTPATIENT)
Dept: GASTROENTEROLOGY | Facility: HOSPITAL | Age: 81
End: 2021-01-14

## 2021-01-15 NOTE — ANESTHESIA POSTPROCEDURE EVALUATION
Post-Op Assessment Note    CV Status:  Stable    Pain management: adequate     Mental Status:  Alert and awake   Hydration Status:  Euvolemic   PONV Controlled:  Controlled   Airway Patency:  Patent      Post Op Vitals Reviewed: Yes            No complications documented      /53 (01/15/21 1449)    Temp      Pulse 65 (01/15/21 1449)   Resp 19 (01/15/21 1449)    SpO2 99 % (01/15/21 1449)

## 2021-01-15 NOTE — DISCHARGE INSTRUCTIONS
Upper Endoscopy   WHAT YOU NEED TO KNOW:   An upper endoscopy is also called an upper gastrointestinal (GI) endoscopy, or an esophagogastroduodenoscopy (EGD)  You may feel bloated, gassy, or have some abdominal discomfort after your procedure  Your throat may be sore for 24 to 36 hours  You may burp or pass gas from air that is still inside your body  DISCHARGE INSTRUCTIONS:   Call 911 if:   · You have sudden chest pain or trouble breathing  Seek care immediately if:   · You feel dizzy or faint  · You have trouble swallowing  · You have severe throat pain  · Your bowel movements are very dark or black  · Your abdomen is hard and firm and you have severe pain  · You vomit blood  Contact your healthcare provider if:   · You feel full or bloated and cannot burp or pass gas  · You have not had a bowel movement for 3 days after your procedure  · You have neck pain  · You have a fever or chills  · You have nausea or are vomiting  · You have a rash or hives  · You have questions or concerns about your endoscopy  Relieve a sore throat:  Suck on throat lozenges or crushed ice  Gargle with a small amount of warm salt water  Mix 1 teaspoon of salt and 1 cup of warm water to make salt water  Relieve gas and discomfort from bloating:  Lie on your right side with a heating pad on your abdomen  Take short walks to help pass gas  Eat small meals until bloating is relieved  Rest after your procedure:  Do not drive or make important decisions until the day after your procedure  Return to your normal activity as directed  You can usually return to work the day after your procedure  Follow up with your healthcare provider as directed:  Write down your questions so you remember to ask them during your visits  © Copyright [address] Information is for End User's use only and may not be sold, redistributed or otherwise used for commercial purposes   All illustrations and images included in CareNotes® are the copyrighted property of A D A M , Inc  or Annelise Jackson   The above information is an  only  It is not intended as medical advice for individual conditions or treatments  Talk to your doctor, nurse or pharmacist before following any medical regimen to see if it is safe and effective for you

## 2021-01-15 NOTE — ANESTHESIA PREPROCEDURE EVALUATION
Procedure:  EGD    Relevant Problems   CARDIO   (+) Coronary artery disease involving native coronary artery of native heart without angina pectoris   (+) Mitral and aortic valve regurgitation   (+) Paroxysmal atrial fibrillation (HCC)   (+) Supraventricular tachycardia (HCC)      GI/HEPATIC   (+) Dysphagia      NEURO/PSYCH   (+) History of head and neck radiation   (+) History of lung cancer in adulthood      PULMONARY   (+) Centrilobular emphysema (HCC)      Other   (+) Diastolic dysfunction without heart failure   (+) Moderate protein-calorie malnutrition (HCC)   (+) Orthostatic hypotension   (+) Pericardial effusion   (+) S/P percutaneous endoscopic gastrostomy (PEG) tube placement [facility])        Physical Exam    Airway    Mallampati score: II  TM Distance: >3 FB  Neck ROM: full     Dental   No notable dental hx     Cardiovascular  Rate: normal, Cardiovascular exam normal    Pulmonary  Breath sounds clear to auscultation, Decreased breath sounds,     Other Findings        Anesthesia Plan  ASA Score- 4     Anesthesia Type- IV sedation with anesthesia with ASA Monitors  Additional Monitors:   Airway Plan:     Comment: Ondansetron 4 mg IV preop by me (1416H)`  Plan Factors-    Chart reviewed  Patient summary reviewed  Patient is not a current smoker  Patient instructed to abstain from smoking on day of procedure  Patient did not smoke on day of surgery  Induction- intravenous  Postoperative Plan-     Informed Consent- Anesthetic plan and risks discussed with patient

## 2021-01-25 NOTE — TELEPHONE ENCOUNTER
Phone call from patient today  She has been waiting for response to questions from 1/18/21 and 1/20/21  Cat scan results  Device results  Can she get Covid vaccine      Please call patient    [phone]

## 2021-01-28 NOTE — TELEPHONE ENCOUNTER
Called and spoke to the patient and reviewed results of her CT scan of chest and abdomen performed at [facility]  Also reviewed her recent device interrogation report  Chest and abdomen pelvis CT was significant for stable lung status, mild pulmonary artery enlargement, coronary calcifications, stable cyst in the liver  Device interrogation showed several tachycardia events  When close the reviewed T-wave over sensing is seen  No atrial fibrillation/flutter events noted  Patient reports that overall she has been all right  She takes atenolol 12 5 mg on days when systolic blood pressure is greater than 105 mmHg  Denies orthopnea or PND  Reviewed with patient's warning signs of heart failure and pulmonary hypertension and advised her to reach out to us if she develops any concerning symptoms      Kim Finney MD

## 2021-01-28 NOTE — TELEPHONE ENCOUNTER
[facility]  Progress Note     Lencho Kelly Patient Status:  Inpatient    1944 MRN [id number]   [facility] 2NE-A Attending Omaira Patrick MD   Hosp Day # 2 PCP Linda Perez MD     Chief Complaint: SOB    S: Patient w Pt called wants call to answer all previous messages  Tavo Garsia please call her  (H)).    Recent Labs   Lab  03/27/19 0806 03/28/19   0549  03/29/19   0527   PTP  28.8*  27.6*  26.8*   INR  2.52*  2.39*  2.30*       Recent Labs   Lab  03/27/19 0806 03/28/19   0549   TROP  <0.045  <0.045            Imaging: Imaging data reviewed i expected to span two midnight's based on the clinical documentation in H+P. Based on patients current state of illness, I anticipate that, after discharge, patient will require TBD.

## 2021-02-05 PROBLEM — Z46.59 ENCOUNTER FOR CARE RELATED TO FEEDING TUBE: Status: ACTIVE | Noted: 2021-01-01

## 2021-02-11 NOTE — PROGRESS NOTES
CARELINK TRANSMISSION: LOOP RECORDER  PRESENTING RHYTHM NSR @ 79 BPM  BATTERY STATUS "OK"  2 TACHY EPISODES W/ EGRAMS SHOWING T WAVE OVERSENSING AND MYOPOTENTIAL OVERSENSING  NO PATIENT ACTIVATED EPISODES  NORMAL DEVICE FUNCTION   DL

## 2021-03-04 NOTE — PROGRESS NOTES
[facility]  [address] [address]  Phone#  [phone]  Fax#  [phone]  *-*-*-*-*-*-*-*-*-*-*-*-*-*-*-*-*-*-*-*-*-*-*-*-*-*-*-*-*-*-*-*-*-*-*-*-*-*-*-*-*-*-*-*-*-*-*-*-*-*-*-*-*-*  ENCOUNTER DATE: 03/04/21 11:25 AM  PATIENT NAME: Elaina Cohen   1940    [id number]  Age: 78 y o  Sex: female  AUTHOR: Brian Barr MD  PRIMARYCARE PHYSICIAN: Vj Best DO    DIAGNOSES:  1  Coronary artery disease with nonobstructive CAD identified in 2015, 60% disease in LAD  2  Paroxysmal atrial fibrillation diagnosed in the remote past and noted to have recurrent bouts in the last few years  3  Essential hypertension  4  Orthostatic hypotension  5  Premature ventricular contractions  6  History of lung carcinoma stage IB (January 2013) status post left upper lobectomy  7  Head and neck cancer April 2015, status s/p radiation therapy  7  Monoclonal gammopathy of unknown significance (MGUS)  9  History of dysphagia and gastroparesis, status post SPECT to placement in 2018  10  Status post loop recorder implantation May 2019  11  Hypothyroidism  12  Mixed dyslipidemia  13  Autonomic dysfunction    [facility] [address]:  ·  Left Ventricle: There is mild concentric hypertrophy  ·  Left Ventricle: Systolic function is normal with an ejection fraction   of 55-60%  ·  Left Ventricle: Wall motion cannot be accurately assessed  ·  Left Ventricle: There is grade I (mild) diastolic dysfunction and   normal left atrial pressure  ·  Aortic Valve: The aortic valve is trileaflet  There is mild sclerosis  ·  Aortic Valve: There is mild regurgitation  ·  Mitral Valve: There is mild regurgitation  ·  Pericardium: There is a small pericardial effusion    ·  Pericardium: There is no echocardiographic evidence of tamponade    CURRENT ECG:  Results for orders placed or performed in visit on 03/04/21   POCT ECG    Narrative    Normal sinus rhythm, HR 65 beats per minute, borderline low QRS voltages, possible prior septal infarction  No significant ST T-wave abnormalities  Loop recorder transmission February 11, 2021  CARELINK TRANSMISSION: LOOP RECORDER  PRESENTING RHYTHM NSR @ 79 BPM  BATTERY STATUS "OK"  2 TACHY EPISODES W/ EGRAMS SHOWING T WAVE OVERSENSING AND MYOPOTENTIAL OVERSENSING  NO PATIENT ACTIVATED EPISODES  NORMAL DEVICE FUNCTION  DL      CARDIOLOGY ASSESSMENT & PLAN:  1  Paroxysmal atrial fibrillation (HCC)  POCT ECG    Digoxin level   2  Supraventricular tachycardia ([address] )     3  Pericardial effusion     4  Diastolic dysfunction without heart failure     5  Mitral and aortic valve regurgitation     6  S/P percutaneous endoscopic gastrostomy (PEG) tube placement ([address] )     7  Orthostatic hypotension       Paroxysmal atrial fibrillation [facility])  Mrs Shaw Nurse appears to be overall stable from cardiac perspective  Though her blood pressure is slightly high today she mentions that she monitors it at home it has been overall stable  She has been of midodrine without significant dizziness symptoms  She is taking atenolol based on her blood pressure readings at home  Her exam appears to be benign with no evidence of heart failure or significant valvular heart disease  Her loop recorder interrogation recently shows no occurrence of atrial fibrillation or other dysrhythmia events  Her last digoxin level was checked in October 2020 and it was 1 6  She is currently taking digoxin every other day  Her home vital signs record indicate good heart rate control  She does seem to have labile blood pressures with intermittent very high blood pressures and low blood pressures  She is not symptomatic when blood pressure is very high but does get lightheaded when blood pressure is low    I concur with her assessment as suggested to her by 1 of her physicians in the past that she may have labile blood pressures because of loss of narrow receptor function due to radiation to her neck      -- I am advising her to continue to take the atenolol medication based on the monitoring instructions provided to her previously  -- she is also advised to continue taking digoxin every other day  She should have repeat digoxin level checked within the next 3 months  Goal would be to keep the level around 1 2 or less than that  -- I am advising her to inform was if her bruising is getting worse or if she has any bleeding or hematoma  Since she has not had documented atrial fibrillation it may be okay to stop anticoagulation if her bleeding risk become high  -- regarding her presence of small pericardial effusion on last echocardiogram we will consider repeating ECHO after visit in 6 months  INTERVAL HISTORY & HISTORY OF PRESENT ILLNESS:  Eliud Franco is here for follow-up regarding her cardiac comorbidities which include:  Paroxysmal atrial fibrillation, orthostatic hypotension, essential hypertension, coronary artery disease and other comorbidities  She is here for follow-up  She reports that she has been overall feeling all right  She occasionally feels palpitations and flip-flopping sensation in the chest but symptoms are transient  She mentions that she has stop taking midodrine  She monitors her blood pressure and takes atenolol 12 5 mg only if her systolic blood pressure is greater than 140  Denies any sustained palpitations, near-syncope/syncope, denies shortness of breath, dizziness, chest pains  Functional capacity status:  Good   (Excellent- >10 METs; Good: (7-10 METs); Moderate (4-7 METs); Poor (<= 4 METs)    Any chronic stressors:  None   (feeling of poor health, financial problems, and social isolation etc)  Tobacco or alcohol dependence:  None    She does not eat anything due to her esophageal pathology, but is able to drink  She gets feeding through her PEG tube regularly    Reports some irritation around PEG tube insertion site  She has mentioned this to her primary care physician and she has been advised to follow-up with wound care  REVIEW OF SYMPTOMS:    Positive for:  As described in HPI  Also reports transient tachycardic episodes about twice since she was seen last time  Negative for: All remaining as reviewed below and in HPI  SYSTEM SYMPTOMS REVIEWED:  General--weight change, fever, night sweats  Respiratory--cough, wheezing, shortness of breath, sputum production  Cardiovascular--chest pain, syncope, dyspnea on exertion, edema, decline in exercise tolerance, claudication   Gastrointestinal--persistent vomiting, diarrhea, abdominal distention, blood in stool   Muscular or skeletal--joint pain or swelling   Neurologic--headaches, syncope, abnormal movement  Hematologic--history of easy bruising and bleeding   Endocrine--thyroid enlargement, heat or cold intolerance, polyuria   Psychiatric--anxiety, depression     *-*-*-*-*-*-*-*-*-*-*-*-*-*-*-*-*-*-*-*-*-*-*-*-*-*-*-*-*-*-*-*-*-*-*-*-*-*-*-*-*-*-*-*-*-*-*-*-*-*-*-*-*-*-  VITAL SIGNS:  Vitals:    03/04/21 1046   BP: 150/80   BP Location: Right arm   Patient Position: Sitting   Cuff Size: Adult   Pulse: 65   Temp: (!) 96 5 °F (35 8 °C)   Weight: 54 4 kg (120 lb)     PREVIOUS WEIGHTS:   Wt Readings from Last 25 Encounters:   03/04/21 54 4 kg (120 lb)   02/05/21 54 kg (119 lb)   01/15/21 54 kg (119 lb)   12/23/20 54 kg (119 lb)   10/19/20 54 9 kg (121 lb)   09/17/20 54 kg (119 lb)   04/08/16 45 4 kg (100 lb)      *-*-*-*-*-*-*-*-*-*-*-*-*-*-*-*-*-*-*-*-*-*-*-*-*-*-*-*-*-*-*-*-*-*-*-*-*-*-*-*-*-*-*-*-*-*-*-*-*-*-*-*-*-*-  PHYSICAL EXAM:  General Appearance:    Alert, cooperative, no distress, appears stated age, thin the built, slightly frail   Head, Eyes, ENT:     signs of protein calorie malnutrition, moist mucous mebranes  Evidence of focal cord dysfunction during speech     Neck:    extensive thinning and scarring of the neck region going to her history of neck cancer and thyroid dysfunction  Back:     Symmetric, no curvature  Has kyphosis  Lungs:     Respirations unlabored   decreased breath sounds bilaterally without wheeze rhonchi or rales,    Chest wall:    No tenderness or deformity   Heart:    Regular rate and rhythm, S1 and S2 normal, no murmur, rub  or gallop  Abdomen:     Soft, has  PEG tubein place   Extremities:   Extremities warm  There is no cyanosis or edema   Skin:   She has bruising around both the knees  There is no hematoma       *-*-*-*-*-*-*-*-*-*-*-*-*-*-*-*-*-*-*-*-*-*-*-*-*-*-*-*-*-*-*-*-*-*-*-*-*-*-*-*-*-*-*-*-*-*-*-*-*-*-*-*-*-*-  -  CURRENT MEDICATION LIST:    Current Outpatient Medications:     apixaban (ELIQUIS) 2 5 mg, Take 1 tablet (2 5 mg total) by mouth 2 (two) times a day, Disp: 180 tablet, Rfl: 3    atenolol (TENORMIN) 25 mg tablet, Take 12 5 mg by mouth daily, Disp: , Rfl:     atorvastatin (LIPITOR) 20 mg tablet, atorvastatin 20 mg tablet, Disp: , Rfl:     digoxin (LANOXIN) 0 125 mg tablet, Take 1 tablet (125 mcg total) by mouth every other day, Disp: 45 tablet, Rfl: 3    levothyroxine 112 mcg tablet, Take 112 mcg by mouth daily 100mg daily, Disp: , Rfl:     midodrine (PROAMATINE) 2 5 mg tablet, , Disp: , Rfl:     ALLERGIES:  Allergies   Allergen Reactions    Albuterol Other (See Comments)     Other reaction(s): Other (See Comments)  Afib  Causes afib      Methylprednisolone Other (See Comments)     Dizziness  Patient states adverse effect, "i feel weird in the head"       *-*-*-*-*-*-*-*-*-*-*-*-*-*-*-*-*-*-*-*-*-*-*-*-*-*-*-*-*-*-*-*-*-*-*-*-*-*-*-*-*-*-*-*-*-*-*-*-*-*-*-*-*-*-  The LABORATORY DATA:  I have personally reviewed pertinent labs      No results found for: NA  No results found for: K, CL, CO2, ANIONGAP, BUN, CREATININE, EGFR, GLUCOSE, CALCIUM, AST, ALT, ALKPHOS, PROT, BILITOT, MG  No results found for: WBC, HGB, PLT  No results found for: PT, PTT, INR  No results found for: CKMB, DIGOXIN  No results found for: TSH  No results found for: CHOL   No results found for: HGBA1C  No results found for: Jai Rung, GRAMSTAIN, URINECX, WOUNDCULT, BODYFLUIDCUL, MRSACULTURE, INFLUAPCR, INFLUBPCR, RSVPCR, LEGIONELLAUR, CDIFFTOXINB    *-*-*-*-*-*-*-*-*-*-*-*-*-*-*-*-*-*-*-*-*-*-*-*-*-*-*-*-*-*-*-*-*-*-*-*-*-*-*-*-*-*-*-*-*-*-*-*-*-*-*-*-*-*-  PREVIOUS CARDIOLOGY & RADIOLOGY RESULTS:  No results found for this or any previous visit  No results found for this or any previous visit  No results found for this or any previous visit  No results found for this or any previous visit  Cardiac EP device report  CARELINK TRANSMISSION: LOOP RECORDER  PRESENTING RHYTHM NSR @ 79 BPM  BATTERY STATUS "OK"  2 TACHY EPISODES W/ EGRAMS SHOWING T WAVE OVERSENSING AND MYOPOTENTIAL OVERSENSING  NO PATIENT ACTIVATED EPISODES  NORMAL DEVICE FUNCTION   DL        *-*-*-*-*-*-*-*-*-*-*-*-*-*-*-*-*-*-*-*-*-*-*-*-*-*-*-*-*-*-*-*-*-*-*-*-*-*-*-*-*-*-*-*-*-*-*-*-*-*-*-*-*-*-  SIGNATURES:   @FVE@   Brian Barr MD     *-*-*-*-*-*-*-*-*-*-*-*-*-*-*-*-*-*-*-*-*-*-*-*-*-*-*-*-*-*-*-*-*-*-*-*-*-*-*-*-*-*-*-*-*-*-*-*-*-*-*-*-*-*-    PAST MEDICAL HISTORY:  Past Medical History:   Diagnosis Date    Abnormal ECG     Arrhythmia     Asthma     Atrial fibrillation (HCC)     Autonomic dysfunction     Cancer (HCC)     lung, head and neck    Coronary artery disease     Disease of thyroid gland     Dysphagia     Gastroparesis     Heart murmur     mitral and aortic regurg    Hyperlipidemia     Hypertension     Irregular heart beat     PAST SURGICAL HISTORY:   Past Surgical History:   Procedure Laterality Date    CARDIAC CATHETERIZATION      CARDIAC LOOP RECORDER      ESOPHAGOGASTRODUODENOSCOPY W/ PEG      HEAD AND NECK DEBRIDEMENT      LUNG SURGERY Left     upper lobectomy         FAMILY HISTORY:  Family History   Problem Relation Age of Onset    Lung cancer Sister     SOCIAL HISTORY:  Social History     Tobacco Use   Smoking Status Former Smoker    Packs/day: 0 50    Years: 20 00    Pack years: 10 00   Smokeless Tobacco Never Used   Tobacco Comment    Quit at age 41      Social History     Substance and Sexual Activity   Alcohol Use Not Currently     Social History     Substance and Sexual Activity   Drug Use Never    [unfilled]     *-*-*-*-*-*-*-*-*-*-*-*-*-*-*-*-*-*-*-*-*-*-*-*-*-*-*-*-*-*-*-*-*-*-*-*-*-*-*-*-*-*-*-*-*-*-*-*-*-*-*-*-*-*  ALLERGIES:  Allergies   Allergen Reactions    Albuterol Other (See Comments)     Other reaction(s): Other (See Comments)  Afib  Causes afib      Methylprednisolone Other (See Comments)     Dizziness   Patient states adverse effect, "i feel weird in the head"    CURRENT SCHEDULED MEDICATIONS:    Current Outpatient Medications:     apixaban (ELIQUIS) 2 5 mg, Take 1 tablet (2 5 mg total) by mouth 2 (two) times a day, Disp: 180 tablet, Rfl: 3    atenolol (TENORMIN) 25 mg tablet, Take 12 5 mg by mouth daily, Disp: , Rfl:     atorvastatin (LIPITOR) 20 mg tablet, atorvastatin 20 mg tablet, Disp: , Rfl:     digoxin (LANOXIN) 0 125 mg tablet, Take 1 tablet (125 mcg total) by mouth every other day, Disp: 45 tablet, Rfl: 3    levothyroxine 112 mcg tablet, Take 112 mcg by mouth daily 100mg daily, Disp: , Rfl:     midodrine (PROAMATINE) 2 5 mg tablet, , Disp: , Rfl:      *-*-*-*-*-*-*-*-*-*-*-*-*-*-*-*-*-*-*-*-*-*-*-*-*-*-*-*-*-*-*-*-*-*-*-*-*-*-*-*-*-*-*-*-*-*-*-*-*-*-*-*-*-*

## 2021-03-04 NOTE — ASSESSMENT & PLAN NOTE
Mrs Terrence Mcguire appears to be overall stable from cardiac perspective  Though her blood pressure is slightly high today she mentions that she monitors it at home it has been overall stable  She has been of midodrine without significant dizziness symptoms  She is taking atenolol based on her blood pressure readings at home  Her exam appears to be benign with no evidence of heart failure or significant valvular heart disease  Her loop recorder interrogation recently shows no occurrence of atrial fibrillation or other dysrhythmia events  Her last digoxin level was checked in October 2020 and it was 1 6  She is currently taking digoxin every other day  Her home vital signs record indicate good heart rate control  She does seem to have labile blood pressures with intermittent very high blood pressures and low blood pressures  She is not symptomatic when blood pressure is very high but does get lightheaded when blood pressure is low  I concur with her assessment as suggested to her by 1 of her physicians in the past that she may have labile blood pressures because of loss of narrow receptor function due to radiation to her neck      -- I am advising her to continue to take the atenolol medication based on the monitoring instructions provided to her previously  -- she is also advised to continue taking digoxin every other day  She should have repeat digoxin level checked within the next 3 months  Goal would be to keep the level around 1 2 or less than that  -- I am advising her to inform was if her bruising is getting worse or if she has any bleeding or hematoma  Since she has not had documented atrial fibrillation it may be okay to stop anticoagulation if her bleeding risk become high  -- regarding her presence of small pericardial effusion on last echocardiogram we will consider repeating ECHO after visit in 6 months

## 2021-03-04 NOTE — PATIENT INSTRUCTIONS
CARDIOLOGY ASSESSMENT & PLAN:  1  Paroxysmal atrial fibrillation (HCC)  POCT ECG    Digoxin level   2  Supraventricular tachycardia ([address] )     3  Pericardial effusion     4  Diastolic dysfunction without heart failure     5  Mitral and aortic valve regurgitation     6  S/P percutaneous endoscopic gastrostomy (PEG) tube placement ([facility] )     7  Orthostatic hypotension       Paroxysmal atrial fibrillation [facility])  Mrs Jesica Snow appears to be overall stable from cardiac perspective  Though her blood pressure is slightly high today she mentions that she monitors it at home it has been overall stable  She has been of midodrine without significant dizziness symptoms  She is taking atenolol based on her blood pressure readings at home  Her exam appears to be benign with no evidence of heart failure or significant valvular heart disease  Her loop recorder interrogation recently shows no occurrence of atrial fibrillation or other dysrhythmia events  Her last digoxin level was checked in October 2020 and it was 1 6  She is currently taking digoxin every other day  Her home vital signs record indicate good heart rate control  She does seem to have labile blood pressures with intermittent very high blood pressures and low blood pressures  She is not symptomatic when blood pressure is very high but does get lightheaded when blood pressure is low  I concur with her assessment as suggested to her by 1 of her physicians in the past that she may have labile blood pressures because of loss of narrow receptor function due to radiation to her neck      -- I am advising her to continue to take the atenolol medication based on the monitoring instructions provided to her previously  -- she is also advised to continue taking digoxin every other day  She should have repeat digoxin level checked within the next 3 months  Goal would be to keep the level around 1 2 or less than that      -- I am advising her to inform was if her bruising is getting worse or if she has any bleeding or hematoma  Since she has not had documented atrial fibrillation it may be okay to stop anticoagulation if her bleeding risk become high  -- regarding her presence of small pericardial effusion on last echocardiogram we will consider repeating ECHO after visit in 6 months

## 2021-03-10 NOTE — TELEPHONE ENCOUNTER
Pt returned call had episodes of "vertigo"  per patient which occurred Friday through Monday, no episodes today  Stated was okay with sitting but with any movement had unsteadiness feeling, unable to turn quickly, she would like to discuss with Dr Simba Mcbride  Thanks

## 2021-03-10 NOTE — TELEPHONE ENCOUNTER
Pt called wanted return call with a message she wanted to leave for Dr Dao Barnes  Called back was disconnected, attempted calling again no answer

## 2021-03-11 NOTE — TELEPHONE ENCOUNTER
Spoke to patient  Seems like BPV  Advised followup with PCP and may need referral to PT for vestibular rehabilitation  Thanks      Estefanía Arciniega MD

## 2021-03-15 PROBLEM — R06.02 SHORTNESS OF BREATH: Status: ACTIVE | Noted: 2021-01-01

## 2021-03-15 NOTE — ED PROVIDER NOTES
History  Chief Complaint   Patient presents with    Shortness of Breath     patient reports feeling SOB chronically, but worst recently with activity causing dizziness  77 YO female presents with VARGAS and lightheadedness  Pt states she has chronic SOB but has had worsening symptoms with ambulation, particularly with walking up stairs  Pt additionally notes she will be feeling lightheaded wit exertion  She denies chest pain but states she may have some anterior chest congestions, feels like there is something she should be able to cough up  Pt denies fevers, no runny nose or nasal congestion  Pt has a known Hx of valvular disease, lobectomy from COPD  Pt denies CP/F/C/N/V/D/C, no dysuria, burning on urination or blood in urine  History provided by:  Patient   used: No    Shortness of Breath  Onset quality:  Gradual  Duration:  1 week  Timing:  Intermittent  Progression:  Worsening  Chronicity:  New  Relieved by:  Nothing  Worsened by:  Exertion  Associated symptoms: no abdominal pain, no chest pain, no fever, no rash and no vomiting        Prior to Admission Medications   Prescriptions Last Dose Informant Patient Reported? Taking?    apixaban (ELIQUIS) 2 5 mg 3/15/2021 at Unknown time Self No Yes   Sig: Take 1 tablet (2 5 mg total) by mouth 2 (two) times a day   atenolol (TENORMIN) 25 mg tablet 3/15/2021 at Unknown time Self Yes Yes   Sig: Take 12 5 mg by mouth daily   atorvastatin (LIPITOR) 20 mg tablet 3/15/2021 at Unknown time Self Yes Yes   Sig: atorvastatin 20 mg tablet   digoxin (LANOXIN) 0 125 mg tablet 3/14/2021 at Unknown time Self No Yes   Sig: Take 1 tablet (125 mcg total) by mouth every other day   levothyroxine 112 mcg tablet 3/15/2021 at Unknown time Self Yes Yes   Sig: Take 112 mcg by mouth daily 100mg daily   midodrine (PROAMATINE) 2 5 mg tablet  Self Yes No      Facility-Administered Medications: None       Past Medical History:   Diagnosis Date    Abnormal ECG     Arrhythmia     Asthma     Atrial fibrillation (HCC)     Autonomic dysfunction     Cancer (HCC)     lung, head and neck    Coronary artery disease     Disease of thyroid gland     Dysphagia     Gastroparesis     Heart murmur     mitral and aortic regurg    Hyperlipidemia     Hypertension     Irregular heart beat        Past Surgical History:   Procedure Laterality Date    CARDIAC CATHETERIZATION      CARDIAC LOOP RECORDER      ESOPHAGOGASTRODUODENOSCOPY W/ PEG      HEAD AND NECK DEBRIDEMENT      LUNG SURGERY Left     upper lobectomy       Family History   Problem Relation Age of Onset    Lung cancer Sister      I have reviewed and agree with the history as documented  E-Cigarette/Vaping    E-Cigarette Use Never User     Quit Date 9/17/1985      E-Cigarette/Vaping Substances    Nicotine No     THC No     CBD No     Flavoring No     Other No     Unknown No      Social History     Tobacco Use    Smoking status: Former Smoker     Packs/day: 0 50     Years: 20 00     Pack years: 10 00    Smokeless tobacco: Never Used    Tobacco comment: Quit at age 41   Substance Use Topics    Alcohol use: Not Currently    Drug use: Never       Review of Systems   Constitutional: Negative for chills, fatigue and fever  HENT: Negative for dental problem  Eyes: Negative for visual disturbance  Respiratory: Positive for shortness of breath  Cardiovascular: Negative for chest pain  Gastrointestinal: Negative for abdominal pain, diarrhea and vomiting  Genitourinary: Negative for dysuria and frequency  Musculoskeletal: Negative for arthralgias  Skin: Negative for rash  Neurological: Positive for light-headedness  Negative for weakness  Psychiatric/Behavioral: Negative for agitation, behavioral problems and confusion  All other systems reviewed and are negative  Physical Exam  Physical Exam  Vitals signs and nursing note reviewed     Constitutional:       Appearance: Normal appearance  HENT:      Head: Normocephalic and atraumatic  Eyes:      Extraocular Movements: Extraocular movements intact  Conjunctiva/sclera: Conjunctivae normal    Neck:      Musculoskeletal: Normal range of motion  Cardiovascular:      Rate and Rhythm: Normal rate  Pulmonary:      Effort: Pulmonary effort is normal    Abdominal:      General: There is no distension  Musculoskeletal: Normal range of motion  Skin:     Findings: No rash  Neurological:      General: No focal deficit present  Mental Status: She is alert  Cranial Nerves: No cranial nerve deficit     Psychiatric:         Mood and Affect: Mood normal          Vital Signs  ED Triage Vitals   Temperature Pulse Respirations Blood Pressure SpO2   03/15/21 1701 03/15/21 1701 03/15/21 1701 03/15/21 1701 03/15/21 1701   98 6 °F (37 °C) 82 18 109/59 99 %      Temp Source Heart Rate Source Patient Position - Orthostatic VS BP Location FiO2 (%)   03/15/21 1701 03/15/21 1701 03/15/21 1901 03/15/21 1901 --   Oral Monitor Sitting Left arm       Pain Score       03/15/21 1701       No Pain           Vitals:    03/15/21 1901 03/15/21 2111 03/15/21 2123 03/15/21 2139   BP: (!) 180/92 (!) 212/112 (!) 198/100 154/79   Pulse: 75 81 78 84   Patient Position - Orthostatic VS: Sitting Sitting Sitting          Visual Acuity      ED Medications  Medications   hydrALAZINE (APRESOLINE) injection 10 mg (10 mg Intravenous Given 3/15/21 2120)   atenolol (TENORMIN) tablet 12 5 mg (has no administration in time range)   digoxin (LANOXIN) tablet 125 mcg (has no administration in time range)   levothyroxine tablet 112 mcg (has no administration in time range)   acetaminophen (TYLENOL) tablet 650 mg (has no administration in time range)   levalbuterol (XOPENEX) inhalation solution 1 25 mg (has no administration in time range)       Diagnostic Studies  Results Reviewed     Procedure Component Value Units Date/Time    TSH [[id number]]  (Normal) Collected: 03/15/21 1846    Lab Status: Final result Specimen: Blood from Arm, Right Updated: 03/15/21 2008     TSH 3RD GENERATON 1 477 uIU/mL     Narrative:      Patients undergoing fluorescein dye angiography may retain small amounts of fluorescein in the body for 48-72 hours post procedure  Samples containing fluorescein can produce falsely depressed TSH values  If the patient had this procedure,a specimen should be resubmitted post fluorescein clearance        Hepatic function panel [[id number]]  (Normal) Collected: 03/15/21 1846    Lab Status: Final result Specimen: Blood from Arm, Right Updated: 03/15/21 1942     Total Bilirubin 0 39 mg/dL      Bilirubin, Direct 0 11 mg/dL      Alkaline Phosphatase 102 U/L      AST 25 U/L      ALT 45 U/L      Total Protein 7 1 g/dL      Albumin 3 7 g/dL     NT-BNP PRO [[id number]]  (Abnormal) Collected: 03/15/21 1846    Lab Status: Final result Specimen: Blood from Arm, Right Updated: 03/15/21 1942     NT-proBNP 998 pg/mL     Magnesium [[id number]]  (Normal) Collected: 03/15/21 1846    Lab Status: Final result Specimen: Blood from Arm, Right Updated: 03/15/21 1942     Magnesium 2 3 mg/dL     Troponin I [[id number]]  (Normal) Collected: 03/15/21 1846    Lab Status: Final result Specimen: Blood from Arm, Right Updated: 03/15/21 1940     Troponin I <0 02 ng/mL     Basic metabolic panel [[id number]]  (Abnormal) Collected: 03/15/21 1846    Lab Status: Final result Specimen: Blood from Arm, Right Updated: 03/15/21 1936     Sodium 140 mmol/L      Potassium 4 0 mmol/L      Chloride 101 mmol/L      CO2 30 mmol/L      ANION GAP 9 mmol/L      BUN 49 mg/dL      Creatinine 1 18 mg/dL      Glucose 89 mg/dL      Calcium 9 7 mg/dL      eGFR 43 ml/min/1 73sq m     Narrative:      [place] guidelines for Chronic Kidney Disease (CKD):     Stage 1 with normal or high GFR (GFR > 90 mL/min/1 73 square meters)    Stage 2 Mild CKD (GFR = 60-89 mL/min/1 73 square meters)    Stage 3A Moderate CKD (GFR = 45-59 mL/min/1 73 square meters)    Stage 3B Moderate CKD (GFR = 30-44 mL/min/1 73 square meters)    Stage 4 Severe CKD (GFR = 15-29 mL/min/1 73 square meters)    Stage 5 End Stage CKD (GFR <15 mL/min/1 73 square meters)  Note: GFR calculation is accurate only with a steady state creatinine    CBC and differential [[id number]]  (Abnormal) Collected: 03/15/21 1846    Lab Status: Final result Specimen: Blood from Arm, Right Updated: 03/15/21 1856     WBC 12 07 Thousand/uL      RBC 4 00 Million/uL      Hemoglobin 13 3 g/dL      Hematocrit 41 0 %       fL      MCH 33 3 pg      MCHC 32 4 g/dL      RDW 12 0 %      MPV 9 7 fL      Platelets 160 Thousands/uL      nRBC 0 /100 WBCs      Neutrophils Relative 80 %      Immat GRANS % 1 %      Lymphocytes Relative 7 %      Monocytes Relative 12 %      Eosinophils Relative 0 %      Basophils Relative 0 %      Neutrophils Absolute 9 57 Thousands/µL      Immature Grans Absolute 0 17 Thousand/uL      Lymphocytes Absolute 0 83 Thousands/µL      Monocytes Absolute 1 47 Thousand/µL      Eosinophils Absolute 0 02 Thousand/µL      Basophils Absolute 0 01 Thousands/µL                  XR chest 2 views   Final Result by Roxana Cortez MD (03/15 2157)      No acute cardiopulmonary disease                  Workstation performed: [id number]                    Procedures  ECG 12 Lead Documentation Only    Date/Time: 3/15/2021 6:01 PM  Performed by: Elian Meng MD  Authorized by: Elian Meng MD     ECG reviewed by me, the ED Provider: yes    Patient location:  ED  Previous ECG:     Previous ECG:  Compared to current    Comparison ECG info:  10/25/2017    Similarity:  No change  Interpretation:     Interpretation: non-specific    Rate:     ECG rate:  78    ECG rate assessment: normal    Rhythm:     Rhythm: sinus rhythm    QRS:     QRS axis:  Normal    QRS intervals:  Normal  Conduction:     Conduction: normal    ST segments:     ST segments:  Normal  T waves:     T waves: normal               ED Course                                           MDM  Number of Diagnoses or Management Options  Dyspnea on exertion: new and requires workup  Lightheadedness: new and requires workup  Diagnosis management comments: 1  Shortness of breath - Pt with known cardiac and pulmonary disease, worsening SOB and lightheadedness on exertion  Will check ECG and troponin, CBC, metabolic panel for electrolyte abnormalities and dehydration, CXR and BNP for possible CHF   Concern for possible worsening valvular disease, feel pt would likely benefit from admission for cardiology to evaluate, possible echocardiogram        Amount and/or Complexity of Data Reviewed  Clinical lab tests: reviewed and ordered  Tests in the radiology section of CPT®: ordered and reviewed  Review and summarize past medical records: yes  Discuss the patient with other providers: yes  Independent visualization of images, tracings, or specimens: yes    Patient Progress  Patient progress: stable      Disposition  Final diagnoses:   Lightheadedness   Dyspnea on exertion     Time reflects when diagnosis was documented in both MDM as applicable and the Disposition within this note     Time User Action Codes Description Comment    3/15/2021  8:33 PM Phong Maher Add [E44 0] Moderate protein-calorie malnutrition ([facility] [address] )     3/15/2021  8:34 PM Phong Maher Add [I48 0] Paroxysmal atrial fibrillation ([facility] [address] )     3/15/2021  8:34 PM Jody Zacarias Add [Z85 118] History of lung cancer in adulthood     3/15/2021  8:34 PM Phong Maher Add [I47 1] Supraventricular tachycardia ([facility] [address] )     3/15/2021  9:11 PM Elease Farzana E Add [R42] Lightheadedness     3/15/2021  9:12 PM Elease Farzana E Add [R06 00] Dyspnea on exertion       ED Disposition     ED Disposition Condition Date/Time Comment    Admit Stable Mon Mar 15, 2021  9:11 PM Case was discussed with Dr Ana María Machado and the patient's admission status was agreed to be Admission Status: inpatient status to the service of Dr Helio Rao   Follow-up Information    None         Current Discharge Medication List      CONTINUE these medications which have NOT CHANGED    Details   apixaban (ELIQUIS) 2 5 mg Take 1 tablet (2 5 mg total) by mouth 2 (two) times a day  Qty: 180 tablet, Refills: 3    Associated Diagnoses: PAF (paroxysmal atrial fibrillation) (HCC)      atenolol (TENORMIN) 25 mg tablet Take 12 5 mg by mouth daily      atorvastatin (LIPITOR) 20 mg tablet atorvastatin 20 mg tablet      digoxin (LANOXIN) 0 125 mg tablet Take 1 tablet (125 mcg total) by mouth every other day  Qty: 45 tablet, Refills: 3    Associated Diagnoses: PAF (paroxysmal atrial fibrillation) (HCC)      levothyroxine 112 mcg tablet Take 112 mcg by mouth daily 100mg daily      midodrine (PROAMATINE) 2 5 mg tablet            No discharge procedures on file      PDMP Review     None          ED Provider  Electronically Signed by           Toñito Cano MD  03/15/21 7440

## 2021-03-15 NOTE — TELEPHONE ENCOUNTER
Pt called overweekend started with increased SOB especially with exertion, increased chest congestion states weight is stable, Pt sounds SOB  Also states has increased dizziness with exertion and states BP is elevated 176/100 today  Please advise

## 2021-03-15 NOTE — TELEPHONE ENCOUNTER
Pt called again wanting to know what to do if Dr Zehra Vegas had advisement told her to go to ED if feeling poorly, she states she will wait till hear from Dr Zehra Vegas

## 2021-03-16 NOTE — PLAN OF CARE
Problem: PHYSICAL THERAPY ADULT  Goal: Performs mobility at highest level of function for planned discharge setting  See evaluation for individualized goals  Description: Treatment/Interventions: Functional transfer training, LE strengthening/ROM, Therapeutic exercise, Endurance training, Patient/family training, Gait training, Bed mobility, Spoke to nursing          See flowsheet documentation for full assessment, interventions and recommendations  Note: Prognosis: Good  Problem List: Decreased strength, Decreased endurance, Impaired balance, Decreased mobility  Assessment:  Pt  81 y  o female presents with SOB and dizziness  Past medical hx includes lung CA, PEG tube since 2018, supraventricular tachycardia, and Paroxysmal atrial fibrillation  Pt admitted for Shortness of breath w/ Lightheadedness, Dyspnea on exertion, Moderate protein-calorie malnutrition  Pt referred to PT for functional mobility evaluation & D/C planning w/ orders of ambulate patient  PTA, pt reports being I w/o AD  During evaluation, deficits included dec mobility, balance, ambulation  Pt demonstrated dec endurance and tolerance to activity  Evaluation of functional mobility required independent w/ bed mobility; S for transfers; minAx1 w/ HHA for amb  Pt was able to ambulate 3 trials of 48' w/ HHA  Seated rest breaks provided between trials 2* inc fatigue and dizziness Gait deviations as above, slow w/ a slight limp but no gross LOB noted  BP t/o session as follows: /55; seated post amb 119/70; seated in chair post session 119/67  SpO2 was maintained at 97-99% on RA  Nsg staff most recent vital signs as follows: /71 (BP Location: Left arm)   Pulse 78   Temp 97 6 °F (36 4 °C) (Temporal)   Resp 18   Ht 5' 5" (1 651 m)   Wt 53 6 kg (118 lb 2 7 oz)   SpO2 96%   BMI 19 66 kg/m²   At end of session, pt OOB in chair in stable condition, call bell & phone in reach   Pt was educated on fall precautions and reinforced w/ good understanding  Pt would benefit from continued PT to address deficits as defined above and maximize level of independence with functional mobility and safety  The patient's AM-PAC Basic Mobility Inpatient Short Form Raw Score is 20, Standardized Score is 43 99  A standardized score greater than 42 9 suggests the patient may benefit from discharge to home  Please also refer to the recommendation of the Physical Therapist for safe discharge planning  From PT/mobility standpoint recommendation for D/C home w/ HHPT and inc social support, when medically cleared based on objective measures above, current function, and dec family/caregiver support  CM to follow  Nsg staff to continue to mobilized pt (OOB in chair for all meals & ambulate in room/unit) as tolerated to prevent further decline in function  Nsg notified  Barriers to Discharge: Decreased caregiver support  Barriers to Discharge Comments: Lives alone  PT Discharge Recommendation: Home with skilled therapy, Return to previous environment with social support(HHPT w/ inc social support)          See flowsheet documentation for full assessment

## 2021-03-16 NOTE — DISCHARGE SUMMARY
[address]son  Discharge- [facility] 1940, 78 y o  female MRN: [id number]  Unit/Bed#: E4 -01 Encounter: [id number]  Primary Care Provider: Bharat Best DO   Date and time admitted to hospital: 3/15/2021  5:18 PM      Discharge Date: 3/16/21      * Shortness of breath  Assessment & Plan  Patient with shortness of breath and dizziness  Elevated  minimally  Seen by Cardiology in consultation  Serial cardiac enzymes to rule out myocardial ischemia, doubt ACS - trop normal  Hx of pericardial effusion in the past - No evidence of tamponade physiology  Repeat echo similar to echo from 8/21/20 [facility]   Loop recorders negative for tachycardia episodes  Suspect shortness of breath is multifactorial with contributing factors of emphysema, history of lung cancer, heart failure, pulmonary artery hypertension  Will need ongoing followup with cardiology as an outpt - Dr Ace Gómez - consider outpt stress echo for ischemia evaluation as well as any worsening stress induced diastolic dysfunction or elevation of pulmonary artery pressures with exercise      Dysphagia  Assessment & Plan  Resume tube feeding    S/P percutaneous endoscopic gastrostomy (PEG) tube placement [facility])  Assessment & Plan  Continued on home tube feeds  Has had PEG tube since 2018, secondary to lung cancer and cancer cachexia    Pericardial effusion  Assessment & Plan  Previous history of pericardial effusion  Echo here LVEF 31%, grade 1 diastolic dyfunction, small pericardial effusion identified but no evidence of hemodynamic compromise  With per last echocardiogram from [facility] this was resolved  There is no evidence of tamponade physiology    Diastolic dysfunction without heart failure  Assessment & Plan  Appears euvolemic  She is not on furosemide as an outpatient  Close followup with cardiology is recommended    Coronary artery disease involving native coronary artery of native heart without angina pectoris  Assessment & Plan  Continue beta-blocker, patient is off Lipitor    Moderate protein-calorie malnutrition ([facility] [address] )  Assessment & Plan  Malnutrition Findings:   BMI Findings: Body mass index is 19 66 kg/m²  Centrilobular emphysema ([facility] [address] )  Assessment & Plan  Continue inhalers as needed - no evidence of bronchospasm    Paroxysmal atrial fibrillation ([facility] [address] )  Assessment & Plan  Ventricular rate control  Continue digoxin as well as atenolol  Anticoagulated with Eliquis      Consultations During Hospital Stay:  · Cardiology   · Nutrition     Procedures Performed:   ·  3/16/21: Echocardiogram:  SUMMARY  LEFT VENTRICLE:  Systolic function was normal by visual assessment  Ejection fraction was estimated to be 60 %  There were no regional wall motion abnormalities  Doppler parameters were consistent with abnormal left ventricular relaxation (grade 1 diastolic dysfunction)      MITRAL VALVE:  There was mild regurgitation      AORTIC VALVE:  There was mild regurgitation  AI  ms      TRICUSPID VALVE:  There was mild regurgitation  Estimated peak PA pressure was 25 mmHg      PULMONIC VALVE:  There was trace regurgitation      IVC, HEPATIC VEINS:  Respirophasic changes were normal  Estimated right atrial pressure of 3 mmHg, Estimated PASP 25 mmHg, Estimated PCWP of 11 4 mmHg (by E/e')      PERICARDIUM:  A small pericardial effusion was identified circumferential to the heart  There was no evidence of hemodynamic compromise  Significant Findings / Test Results:   ·  See above    Incidental Findings:   ·  none    Test Results Pending at Discharge (will require follow up):   ·  none     Outpatient  Follow-up Requested:  ·  cardiology -called appointment    Complications:   none    Hospital Course: Terrence Mcguire is a 78 y o  female patient who originally presented to the hospital on 3/15/2021 due to shortness of breath and dizziness   She underwent cardiology workup and was seen in consultation by cardiology  She had an echocardiogram  See above for results  Workup without acute findings and is stable for discharge  Please see above for further details  Condition at Discharge: stable     Discharge Day Visit / Exam:     Subjective:  Patient reports he feels better than yesterday  Her symptoms seem to come and go  Discussed result of echocardiogram   Stable for discharge  She will follow up with cardiologist as planned  She will be set up for home PT  OT which she is agreeable for  Vitals: Blood Pressure: 123/63 (03/16/21 1500)  Pulse: 91 (03/16/21 1500)  Temperature: 97 5 °F (36 4 °C) (03/16/21 1500)  Temp Source: Temporal (03/16/21 1500)  Respirations: 18 (03/16/21 1500)  Height: 5' 5" (165 1 cm) (03/15/21 2316)  Weight - Scale: 53 6 kg (118 lb 2 7 oz) (03/16/21 0600)  SpO2: 98 % (03/16/21 1500)     Exam:   Physical Exam  Vitals signs and nursing note reviewed  Constitutional:       Appearance: Normal appearance  She is normal weight  Comments: Cachectic-appearing   HENT:      Head: Normocephalic and atraumatic  Eyes:      General: No scleral icterus  Cardiovascular:      Rate and Rhythm: Normal rate and regular rhythm  Pulmonary:      Effort: No respiratory distress  Breath sounds: Normal breath sounds  No stridor  No wheezing or rhonchi  Abdominal:      General: Bowel sounds are normal  There is no distension  Palpations: Abdomen is soft  There is no mass  Tenderness: There is no abdominal tenderness  Hernia: No hernia is present  Musculoskeletal:         General: No swelling  Skin:     General: Skin is warm and dry  Neurological:      Mental Status: She is alert and oriented to person, place, and time  Mental status is at baseline  Psychiatric:         Mood and Affect: Mood normal          Behavior: Behavior normal          Discharge instructions/Information to patient and family:   See after visit summary for information provided to patient and family  Provisions for Follow-Up Care:  See after visit summary for information related to follow-up care and any pertinent home health orders  Planned Readmission: no     Discharge Statement:  I spent 45 minutes discharging the patient  This time was spent on the day of discharge  I had direct contact with the patient on the day of discharge  Greater than 50% of the total time was spent examining patient, answering all patient questions, arranging and discussing plan of care with patient as well as directly providing post-discharge instructions  Additional time then spent on discharge activities  Discharge Medications:  See after visit summary for reconciled discharge medications provided to patient and family        ** Please Note: This note has been constructed using a voice recognition system **

## 2021-03-16 NOTE — PLAN OF CARE
Problem: Potential for Falls  Goal: Patient will remain free of falls  Description: INTERVENTIONS:  - Assess patient frequently for physical needs  -  Identify cognitive and physical deficits and behaviors that affect risk of falls    -  [place] fall precautions as indicated by assessment   - Educate patient/family on patient safety including physical limitations  - Instruct patient to call for assistance with activity based on assessment  - Modify environment to reduce risk of injury  - Consider OT/PT consult to assist with strengthening/mobility  Outcome: Progressing     Problem: PAIN - ADULT  Goal: Verbalizes/displays adequate comfort level or baseline comfort level  Description: Interventions:  - Encourage patient to monitor pain and request assistance  - Assess pain using appropriate pain scale  - Administer analgesics based on type and severity of pain and evaluate response  - Implement non-pharmacological measures as appropriate and evaluate response  - Consider cultural and social influences on pain and pain management  - Notify physician/advanced practitioner if interventions unsuccessful or patient reports new pain  Outcome: Progressing     Problem: DISCHARGE PLANNING  Goal: Discharge to home or other facility with appropriate resources  Description: INTERVENTIONS:  - Identify barriers to discharge w/patient and caregiver  - Arrange for needed discharge resources and transportation as appropriate  - Identify discharge learning needs (meds, wound care, etc )  - Arrange for interpretive services to assist at discharge as needed  - Refer to Case Management Department for coordinating discharge planning if the patient needs post-hospital services based on physician/advanced practitioner order or complex needs related to functional status, cognitive ability, or social support system  Outcome: Progressing     Problem: Knowledge Deficit  Goal: Patient/family/caregiver demonstrates understanding of disease process, treatment plan, medications, and discharge instructions  Description: Complete learning assessment and assess knowledge base    Interventions:  - Provide teaching at level of understanding  - Provide teaching via preferred learning methods  Outcome: Progressing     Problem: CARDIOVASCULAR - ADULT  Goal: Maintains optimal cardiac output and hemodynamic stability  Description: INTERVENTIONS:  - Monitor I/O, vital signs and rhythm  - Monitor for S/S and trends of decreased cardiac output  - Administer and titrate ordered vasoactive medications to optimize hemodynamic stability  - Assess quality of pulses, skin color and temperature  - Assess for signs of decreased coronary artery perfusion  - Instruct patient to report change in severity of symptoms  Outcome: Progressing  Goal: Absence of cardiac dysrhythmias or at baseline rhythm  Description: INTERVENTIONS:  - Continuous cardiac monitoring, vital signs, obtain 12 lead EKG if ordered  - Administer antiarrhythmic and heart rate control medications as ordered  - Monitor electrolytes and administer replacement therapy as ordered  Outcome: Progressing     Problem: RESPIRATORY - ADULT  Goal: Achieves optimal ventilation and oxygenation  Description: INTERVENTIONS:  - Assess for changes in respiratory status  - Assess for changes in mentation and behavior  - Position to facilitate oxygenation and minimize respiratory effort  - Oxygen administered by appropriate delivery if ordered  - Initiate smoking cessation education as indicated  - Encourage broncho-pulmonary hygiene including cough, deep breathe, Incentive Spirometry  - Assess the need for suctioning and aspirate as needed  - Assess and instruct to report SOB or any respiratory difficulty  - Respiratory Therapy support as indicated  Outcome: Progressing     Problem: GASTROINTESTINAL - ADULT  Goal: Maintains adequate nutritional intake  Description: INTERVENTIONS:  - Monitor percentage of each meal consumed  - Identify factors contributing to decreased intake, treat as appropriate  - Assist with meals as needed  - Monitor I&O, weight, and lab values if indicated  - Obtain nutrition services referral as needed  Outcome: Progressing     Problem: Nutrition/Hydration-ADULT  Goal: Nutrient/Hydration intake appropriate for improving, restoring or maintaining nutritional needs  Description: Monitor and assess patient's nutrition/hydration status for malnutrition  Collaborate with interdisciplinary team and initiate plan and interventions as ordered  Monitor patient's weight and dietary intake as ordered or per policy  Utilize nutrition screening tool and intervene as necessary  Determine patient's food preferences and provide high-protein, high-caloric foods as appropriate       INTERVENTIONS:  - Monitor oral intake, urinary output, labs, and treatment plans  - Assess nutrition and hydration status and recommend course of action  - Evaluate amount of meals eaten  - Assist patient with eating if necessary   - Allow adequate time for meals  - Recommend/ encourage appropriate diets, oral nutritional supplements, and vitamin/mineral supplements  - Order, calculate, and assess calorie counts as needed  - Recommend, monitor, and adjust tube feedings and TPN/PPN based on assessed needs  - Assess need for intravenous fluids  - Provide specific nutrition/hydration education as appropriate  - Include patient/family/caregiver in decisions related to nutrition  Outcome: Progressing

## 2021-03-16 NOTE — UTILIZATION REVIEW
Initial Clinical Review    Admission: Date/Time/Statement:   Admission Orders (From admission, onward)     Ordered        03/15/21 2055  Place in Observation  Once                   Orders Placed This Encounter   Procedures    Place in Observation     Standing Status:   Standing     Number of Occurrences:   1     Order Specific Question:   Level of Care     Answer:   Med Surg [16]     Order Specific Question:   Bed Type     Answer:   [place] [4]     ED Arrival Information     Expected Arrival Acuity Means of Arrival Escorted By Service Admission Type    - 3/15/2021 16:40 Urgent Walk-In Self Hospitalist Urgent    Arrival Complaint    shortness of breath, dizziness        Chief Complaint   Patient presents with    Shortness of Breath     patient reports feeling SOB chronically, but worst recently with activity causing dizziness  Assessment/Plan: 79 yo female presents to ED with VARGAS and lightheadedness  Hx of SVT as well as atrial fibrillation,  lung cancer and is on PEG feeding due to dysphagia  NTproBNP 998,  WBC's 12 07  CXR No acute cardiopulmonary disease  Admitted to Observation with:  Shortness of breath  Assessment & Plan  Patient with shortness of breath and dizziness  Per emergency room would like admitted for echocardiogram in the morning  Elevated BNP  Will place Cardiology consultation  Serial cardiac enzymes to rule out myocardial ischemia, doubt ACS  BNP elevated minimally  Hx of pericardial effusion in the past - No evidence of tamponade physiology     Has multiple reasons to be short of breath these include emphysema, history of lung cancer, heart failure  Clinically she appears to have none of these         Recent Labs     03/15/21  1846   TROPONINI <0 02      [facility]:  ·  Left Ventricle: There is mild concentric hypertrophy  ·  Left Ventricle: Systolic function is normal with an ejection fraction   of 55-60%    ·  Left Ventricle: Wall motion cannot be accurately assessed  ·  Left Ventricle: There is grade I (mild) diastolic dysfunction and   normal left atrial pressure  ·  Aortic Valve: The aortic valve is trileaflet  There is mild sclerosis  ·  Aortic Valve: There is mild regurgitation  ·  Mitral Valve: There is mild regurgitation  ·  Pericardium: There is a small pericardial effusion  ·  Pericardium: There is no echocardiographic evidence of tamponade     Dysphagia  Assessment & Plan  Resume tube feeding     S/P percutaneous endoscopic gastrostomy (PEG) tube placement [facility]  Assessment & Plan  Will place on tube feeds  Start trickle feeds for now and advance as tolerated  Nutrition consultation placed  Has had PEG tube since 2018, secondary to lung cancer and cancer cachexia     Pericardial effusion  Assessment & Plan  Previous history of pericardial effusion, will obtain echocardiogram to evaluate if this is playing a role  With per last echocardiogram from [facility] this was resolved  There is no evidence of tamponade physiology     Diastolic dysfunction without heart failure  Assessment & Plan  Appears euvolemic  She is not on furosemide as an outpatient     Coronary artery disease involving native coronary artery of native heart without angina pectoris  Assessment & Plan  Continue beta-blocker, patient is off Lipitor     Moderate protein-calorie malnutrition ([address] )  Assessment & Plan  Malnutrition Findings:      BMI Findings: Body mass index is 19 74 kg/m²       Nutrition consultation placed     Centrilobular emphysema ([facility] [address] )  Assessment & Plan  Now resume inhalers as needed - no evidence of bronchospasm     Paroxysmal atrial fibrillation (HCC)  Assessment & Plan  Ventricular rate control  Continue digoxin as well as atenolol  Anticoagulated with Eliquis    Anticipated Length of Stay:  Patient will be admitted on an Observation basis with an anticipated length of stay of  less than 2 midnights     Justification for NAYELI ESTEBAN Stay:  Shortness of breath       ED Triage Vitals   Temperature Pulse Respirations Blood Pressure SpO2   03/15/21 1701 03/15/21 1701 03/15/21 1701 03/15/21 1701 03/15/21 1701   98 6 °F (37 °C) 82 18 109/59 99 %      Temp Source Heart Rate Source Patient Position - Orthostatic VS BP Location FiO2 (%)   03/15/21 1701 03/15/21 1701 03/15/21 1901 03/15/21 1901 --   Oral Monitor Sitting Left arm       Pain Score       03/15/21 1701       No Pain          Wt Readings from Last 1 Encounters:   03/16/21 53 6 kg (118 lb 2 7 oz)     Additional Vital Signs:   03/16/21 0700  97 6 °F (36 4 °C)  78  18  136/71  98  96 %  None (Room air)  Lying   03/16/21 0326  97 5 °F (36 4 °C)  84  18  132/64  --  96 %  None (Room air)  Lying   03/15/21 2316  97 5 °F (36 4 °C)  85  19  101/58  --  99 %  None (Room air)  Lying   03/15/21 2139  --  84  18  154/79  --  99 %  None (Room air)  --   03/15/21 2123  --  78  18  198/100Abnormal   --  98 %  --  Sitting   03/15/21 2111  --  81  18  212/112Abnormal   --  98 %  None (Room air)  Sitting   03/15/21 1901  --  75  16  180/92Abnormal   --  100 %  None (Room air)  Sitting       Pertinent Labs/Diagnostic Test Results:     Results from last 7 days   Lab Units 03/16/21  0527 03/15/21  1846   WBC Thousand/uL 11 17* 12 07*   HEMOGLOBIN g/dL 13 1 13 3   HEMATOCRIT % 39 9 41 0   PLATELETS Thousands/uL 312 328   NEUTROS ABS Thousands/µL  --  9 57*     Results from last 7 days   Lab Units 03/16/21  0527 03/15/21  1846   SODIUM mmol/L 140 140   POTASSIUM mmol/L 4 3 4 0   CHLORIDE mmol/L 103 101   CO2 mmol/L 26 30   ANION GAP mmol/L 11 9   BUN mg/dL 44* 49*   CREATININE mg/dL 0 85 1 18   EGFR ml/min/1 73sq m 64 43   CALCIUM mg/dL 9 4 9 7   MAGNESIUM mg/dL  --  2 3     Results from last 7 days   Lab Units 03/15/21  1846   AST U/L 25   ALT U/L 45   ALK PHOS U/L 102   TOTAL PROTEIN g/dL 7 1   ALBUMIN g/dL 3 7   TOTAL BILIRUBIN mg/dL 0 39   BILIRUBIN DIRECT mg/dL 0 11     Results from last 7 days   Lab Units 03/16/21  0527 03/15/21  1846   GLUCOSE RANDOM mg/dL 99 89     Results from last 7 days   Lab Units 03/15/21  1846   TROPONIN I ng/mL <0 02     Results from last 7 days   Lab Units 03/15/21  1846   TSH 3RD GENERATON uIU/mL 1 477     Results from last 7 days   Lab Units 03/15/21  1846   NT-PRO BNP pg/mL 998*     3/15 EKG: ECG rate:  78    ECG rate assessment: normal    Rhythm:     Rhythm: sinus rhythm    QRS:     QRS axis:  Normal    QRS intervals:  Normal  Conduction:     Conduction: normal    ST segments:     ST segments:  Normal  T waves:     T waves: normal      3/15 CXR:  No acute cardiopulmonary disease       ED Treatment:   Medication Administration from 03/15/2021 1640 to 03/15/2021 2148       Date/Time Order Dose Route Action     03/15/2021 2120 hydrALAZINE (APRESOLINE) injection 10 mg 10 mg Intravenous Given        Past Medical History:   Diagnosis Date    Abnormal ECG     Arrhythmia     Asthma     Atrial fibrillation (HCC)     Autonomic dysfunction     Cancer (HCC)     lung, head and neck    Coronary artery disease     Disease of thyroid gland     Dysphagia     Gastroparesis     Heart murmur     mitral and aortic regurg    Hyperlipidemia     Hypertension     Irregular heart beat      Present on Admission:   Centrilobular emphysema ([address] )   Coronary artery disease involving native coronary artery of native heart without angina pectoris   Diastolic dysfunction without heart failure   Moderate protein-calorie malnutrition (HCC)   Paroxysmal atrial fibrillation (HCC)   Pericardial effusion   Dysphagia      Admitting Diagnosis: Lightheadedness [R42]  Supraventricular tachycardia (HCC) [I47 1]  Paroxysmal atrial fibrillation (HCC) [I48 0]  SOB (shortness of breath) [R06 02]  Dyspnea on exertion [R06 00]  Moderate protein-calorie malnutrition (HCC) [E44 0]  History of lung cancer in adulthood [Z85 118]  Age/Sex: 78 y o  female     Admission Orders:  Scheduled Medications:  apixaban, 2 5 mg, Oral, BID  atenolol, 12 5 mg, Per PEG Tube, Daily  digoxin, 125 mcg, Per PEG Tube, Every Other Day  levothyroxine, 112 mcg, Per PEG Tube, Early Morning    Continuous IV Infusions:     PRN Meds:  acetaminophen, 650 mg, Oral, Q6H PRN x 1 3/16  hydrALAZINE, 10 mg, Intravenous, Q6H PRN x 1 3/15  levalbuterol, 1 25 mg, Nebulization, Q8H PRN    TELEMETRY  SCDs  IP CONSULT TO NUTRITION SERVICES  IP CONSULT TO CARDIOLOGY    Network Utilization Review Department  ATTENTION: Please call with any questions or concerns to [phone] and carefully listen to the prompts so that you are directed to the right person  All voicemails are confidential   Venita Zamarripa all requests for admission clinical reviews, approved or denied determinations and any other requests to dedicated fax number below belonging to the campus where the patient is receiving treatment   List of dedicated fax numbers for the Facilities:  [address] [place] [address] DENIALS (Administrative/Medical Necessity) [phone]   [address] (Maternity/NICU/Pediatrics) [phone]   [address] [place] [address] [address] [place] [address] Dr Benitez Watkins [phone] (Riley Crespo "Tori" 103) [address] [place] [address] [address] [place]) [facility] [place] [address] [phone]

## 2021-03-16 NOTE — CONSULTS
Consult - Cardiology   Keri Robles 78 y o  female MRN: [id number]  Unit/Bed#: E4 -01 Encounter: [id number]        Reason For Consult: Dyspnea                ASSESSMENT:  1  Shortness of breath, chief complaint on arrival  2  Nonobstructive CAD on cardiac catheterization 2015  3  PAF, on Eliquis for CVA risk reduction  4  Hypertension  5  History of lung cancer, s/p left upper lobectomy  6  Head and neck cancer in April 2015, s/p radiation therapy  7  History of dysphagia and gastroparesis, s/p PEG in 2018    PLAN/ DISCUSSION:     Etiology of her symptom complex at this point is not totally clear  ProBNP is mildly elevated but physical exam not consistent with heart failure  Weight is 2 lb lower today than it was in our office  Symptoms would be pretty atypical for anginal equivalent  "Funny feeling" in her head possibly related to vertigo  · Check ambulatory pulse ox    · Update echocardiogram    · Restart Eliquis    · Check digoxin level    · Continue atenolol and digoxin    · With chronic AC from hx of a-fib, doubt that she has a PE    History Of Present Illness: This is an 78year old female who is cared for by Dr Ridge Nguyen having recently been seen on 3/4/21  Cardiac PMH is outlined above  She has chronic shortness of breath which has been present since lobectomy from lung cancer 7-8 years ago  She lives by herself and is still independent  She does her own shopping and is able to ambulate around her house freely  As above she does note chronic shortness of breath but has been dealing this for many years  As recently as 1 month ago she was able to walk around a grocery store for approximately 1 hour and transfer groceries from car to her home with stable shortness of breath  She notes to me what sounds like some gradually worsening shortness of breath with exertion over the last week or so    She is comfortable at rest   Specifically when she gets to the top of her steps in her home when she notices that she is breathing heavier  With this she develops a "funny feeling" in her head  She could not describe it very well but it sounds like it may be some dizziness with near syncope  She has to sit down and rest for both of these symptoms to resolve  They both occur in conjunction with each other (short of breath & funny feeling start at the same time)  She has called our office several times in recent days to report these symptoms and was ultimately advised to go to the ED for evaluation  She has no swelling in her legs  She has no trouble breathing at night  She is still able to lay flat  She has no pain in her chest       Past Medical History:        Past Medical History:   Diagnosis Date    Abnormal ECG     Arrhythmia     Asthma     Atrial fibrillation (HCC)     Autonomic dysfunction     Cancer (HCC)     lung, head and neck    Coronary artery disease     Disease of thyroid gland     Dysphagia     Gastroparesis     Heart murmur     mitral and aortic regurg    Hyperlipidemia     Hypertension     Irregular heart beat       Past Surgical History:   Procedure Laterality Date    CARDIAC CATHETERIZATION      CARDIAC LOOP RECORDER      ESOPHAGOGASTRODUODENOSCOPY W/ PEG      HEAD AND NECK DEBRIDEMENT      LUNG SURGERY Left     upper lobectomy        Allergy:        Allergies   Allergen Reactions    Albuterol Other (See Comments)     Other reaction(s): Other (See Comments)  Afib  Causes afib      Methylprednisolone Other (See Comments)     Dizziness  Patient states adverse effect, "i feel weird in the head"       Medications:       Prior to Admission medications    Medication Sig Start Date End Date Taking?  Authorizing Provider   apixaban (ELIQUIS) 2 5 mg Take 1 tablet (2 5 mg total) by mouth 2 (two) times a day 11/23/20 11/23/21 Yes Brian Barr MD   atenolol (TENORMIN) 25 mg tablet Take 12 5 mg by mouth daily 8/20/20  Yes Historical Provider, MD   atorvastatin (LIPITOR) 20 mg tablet atorvastatin 20 mg tablet   Yes Historical Provider, MD   digoxin (LANOXIN) 0 125 mg tablet Take 1 tablet (125 mcg total) by mouth every other day 11/23/20  Yes Brian Barr MD   levothyroxine 112 mcg tablet Take 112 mcg by mouth daily 100mg daily 8/19/20  Yes Historical Provider, MD   midodrine (PROAMATINE) 2 5 mg tablet  9/15/20   Historical Provider, MD       Family History:     Family History   Problem Relation Age of Onset    Lung cancer Sister         Social History:       Social History     Socioeconomic History    Marital status: Unknown     Spouse name: None    Number of children: None    Years of education: None    Highest education level: None   Occupational History    Occupation: retired - /   Social Needs    Financial resource strain: None    Food insecurity     Worry: None     Inability: None    Transportation needs     Medical: None     Non-medical: None   Tobacco Use    Smoking status: Former Smoker     Packs/day: 0 50     Years: 20 00     Pack years: 10 00    Smokeless tobacco: Never Used    Tobacco comment: Quit at age 41   Substance and Sexual Activity    Alcohol use: Not Currently    Drug use: Never    Sexual activity: None   Lifestyle    Physical activity     Days per week: None     Minutes per session: None    Stress: None   Relationships    Social connections     Talks on phone: None     Gets together: None     Attends Mandaen service: None     Active member of club or organization: None     Attends meetings of clubs or organizations: None     Relationship status: None    Intimate partner violence     Fear of current or ex partner: None     Emotionally abused: None     Physically abused: None     Forced sexual activity: None   Other Topics Concern    None   Social History Narrative    None       ROS:  Symptoms per HPI  Remainder review of systems is negative    Exam:  General:  alert, oriented and in no distress, cooperative  Head: Normocephalic, atraumatic  Eyes:  EOMI  Pupils - equal, round, reactive to accomodation  No icterus  Normal Conjunctiva  Oropharynx: moist and normal-appearing mucosa  Neck: supple, symmetrical, trachea midline and no JVD  Heart:  RRR, No: murmer, rub or gallop, S1 & S2 normal   Respiratory effort / Chest Inspection: unlabored  Lungs:  normal air entry, lungs clear to auscultation and no rales, rhonchi or wheezing   Abdomen: flat, normal findings: bowel sounds normal and soft, non-tender  Lower Limbs:  no pitting edema  Pulses[de-identified]  RLE - DP: present 2+                 LLE - DP: present 2+  Musculoskeletal: ROM grossly normal    DATA:      ECG:      NST  Non-specific changes                 Telemetry: Normal sinus rhythm,   HR 70's         Weights: Wt Readings from Last 3 Encounters:   03/16/21 53 6 kg (118 lb 2 7 oz)   03/04/21 54 4 kg (120 lb)   02/05/21 54 kg (119 lb)   , Body mass index is 19 66 kg/m²           Lab Studies:    Results from last 7 days   Lab Units 03/15/21  1846   TROPONIN I ng/mL <0 02          Results from last 7 days   Lab Units 03/16/21  0527 03/15/21  1846   WBC Thousand/uL 11 17* 12 07*   HEMOGLOBIN g/dL 13 1 13 3   HEMATOCRIT % 39 9 41 0   PLATELETS Thousands/uL 312 328   ,   Results from last 7 days   Lab Units 03/16/21  0527 03/15/21  1846   POTASSIUM mmol/L 4 3 4 0   CHLORIDE mmol/L 103 101   CO2 mmol/L 26 30   BUN mg/dL 44* 49*   CREATININE mg/dL 0 85 1 18   CALCIUM mg/dL 9 4 9 7   ALK PHOS U/L  --  102   ALT U/L  --  45   AST U/L  --  25

## 2021-03-16 NOTE — ASSESSMENT & PLAN NOTE
Patient with shortness of breath and dizziness  Elevated  minimally  Seen by Cardiology in consultation  Serial cardiac enzymes to rule out myocardial ischemia, doubt ACS - trop normal  Hx of pericardial effusion in the past - No evidence of tamponade physiology  Repeat echo similar to echo from 8/21/20 [facility]   Loop recorders negative for tachycardia episodes  Suspect shortness of breath is multifactorial with contributing factors of emphysema, history of lung cancer, heart failure, pulmonary artery hypertension  Will need ongoing followup with cardiology as an outpt - Dr Simba Mcbride - consider outpt stress echo for ischemia evaluation as well as any worsening stress induced diastolic dysfunction or elevation of pulmonary artery pressures with exercise

## 2021-03-16 NOTE — PLAN OF CARE
Problem: Potential for Falls  Goal: Patient will remain free of falls  Description: INTERVENTIONS:  - Assess patient frequently for physical needs  -  Identify cognitive and physical deficits and behaviors that affect risk of falls    -  [place] fall precautions as indicated by assessment   - Educate patient/family on patient safety including physical limitations  - Instruct patient to call for assistance with activity based on assessment  - Modify environment to reduce risk of injury  - Consider OT/PT consult to assist with strengthening/mobility  Outcome: Progressing     Problem: PAIN - ADULT  Goal: Verbalizes/displays adequate comfort level or baseline comfort level  Description: Interventions:  - Encourage patient to monitor pain and request assistance  - Assess pain using appropriate pain scale  - Administer analgesics based on type and severity of pain and evaluate response  - Implement non-pharmacological measures as appropriate and evaluate response  - Consider cultural and social influences on pain and pain management  - Notify physician/advanced practitioner if interventions unsuccessful or patient reports new pain  Outcome: Progressing     Problem: DISCHARGE PLANNING  Goal: Discharge to home or other facility with appropriate resources  Description: INTERVENTIONS:  - Identify barriers to discharge w/patient and caregiver  - Arrange for needed discharge resources and transportation as appropriate  - Identify discharge learning needs (meds, wound care, etc )  - Arrange for interpretive services to assist at discharge as needed  - Refer to Case Management Department for coordinating discharge planning if the patient needs post-hospital services based on physician/advanced practitioner order or complex needs related to functional status, cognitive ability, or social support system  Outcome: Progressing     Problem: Knowledge Deficit  Goal: Patient/family/caregiver demonstrates understanding of disease process, treatment plan, medications, and discharge instructions  Description: Complete learning assessment and assess knowledge base    Interventions:  - Provide teaching at level of understanding  - Provide teaching via preferred learning methods  Outcome: Progressing     Problem: CARDIOVASCULAR - ADULT  Goal: Maintains optimal cardiac output and hemodynamic stability  Description: INTERVENTIONS:  - Monitor I/O, vital signs and rhythm  - Monitor for S/S and trends of decreased cardiac output  - Administer and titrate ordered vasoactive medications to optimize hemodynamic stability  - Assess quality of pulses, skin color and temperature  - Assess for signs of decreased coronary artery perfusion  - Instruct patient to report change in severity of symptoms  Outcome: Progressing  Goal: Absence of cardiac dysrhythmias or at baseline rhythm  Description: INTERVENTIONS:  - Continuous cardiac monitoring, vital signs, obtain 12 lead EKG if ordered  - Administer antiarrhythmic and heart rate control medications as ordered  - Monitor electrolytes and administer replacement therapy as ordered  Outcome: Progressing     Problem: RESPIRATORY - ADULT  Goal: Achieves optimal ventilation and oxygenation  Description: INTERVENTIONS:  - Assess for changes in respiratory status  - Assess for changes in mentation and behavior  - Position to facilitate oxygenation and minimize respiratory effort  - Oxygen administered by appropriate delivery if ordered  - Initiate smoking cessation education as indicated  - Encourage broncho-pulmonary hygiene including cough, deep breathe, Incentive Spirometry  - Assess the need for suctioning and aspirate as needed  - Assess and instruct to report SOB or any respiratory difficulty  - Respiratory Therapy support as indicated  Outcome: Progressing     Problem: GASTROINTESTINAL - ADULT  Goal: Maintains adequate nutritional intake  Description: INTERVENTIONS:  - Monitor percentage of each meal consumed  - Identify factors contributing to decreased intake, treat as appropriate  - Assist with meals as needed  - Monitor I&O, weight, and lab values if indicated  - Obtain nutrition services referral as needed  Outcome: Progressing

## 2021-03-16 NOTE — CASE MANAGEMENT
Pt is currently an obs patient and has notice was given  Met with pt as PT is recommending home PT  [facility] to [facility] for [facility] and [facility]  Made referral in her area  Pt stated her car is in the parking lot, but will get a ride and drive pt home in her car  Will f/u with accepting [facility]  A post acute care recommendation was made by your care team for [facility]  Discussed [place] of Choice with patient  List of agencies given to patient via in person  patient aware the list is custom filtered for them by zip code location and that [facility] post acute providers are designated  Pt stated she went to check something on "My Chart" and got locked out  Talked with IT and they recommend pt call help desk  Pt stated she has done this and waited over hour and no one came on the help  Asked pt is she would be willing to call again  CM reviewed d/c planning process including the following: identifying help at home, patient preference for d/c planning needs, Discharge Lounge, [facility] Meds to Bed program, availability of treatment team to discuss questions or concerns patient and/or family may have regarding understanding medications and recognizing signs and symptoms once discharged  CM also encouraged patient to follow up with all recommended appointments after discharge  Patient advised of importance for patient and family to participate in managing patients medical well being      [address] [facility] has accepted case and will fax AVS

## 2021-03-16 NOTE — ASSESSMENT & PLAN NOTE
Malnutrition Findings:           BMI Findings: Body mass index is 19 74 kg/m²       Nutrition consultation placed

## 2021-03-16 NOTE — H&P
Kyra 285 1940, 78 y o  female MRN: [id number]  Unit/Bed#: ED 32 Encounter: [id number]  Primary Care Provider: Karina Haynes DO   Date and time admitted to hospital: 3/15/2021  5:18 PM    * Shortness of breath  Assessment & Plan  Patient with shortness of breath and dizziness  Per emergency room would like admitted for echocardiogram in the morning  Elevated BNP  Will place Cardiology consultation  Serial cardiac enzymes to rule out myocardial ischemia, doubt ACS  BNP elevated minimally  Hx of pericardial effusion in the past - No evidence of tamponade physiology    Has multiple reasons to be short of breath these include emphysema, history of lung cancer, heart failure  Clinically she appears to have none of these    Recent Labs     03/15/21  1846   TROPONINI <0 02     [facility] [address]:  ·  Left Ventricle: There is mild concentric hypertrophy  ·  Left Ventricle: Systolic function is normal with an ejection fraction   of 55-60%  ·  Left Ventricle: Wall motion cannot be accurately assessed  ·  Left Ventricle: There is grade I (mild) diastolic dysfunction and   normal left atrial pressure  ·  Aortic Valve: The aortic valve is trileaflet  There is mild sclerosis  ·  Aortic Valve: There is mild regurgitation  ·  Mitral Valve: There is mild regurgitation  ·  Pericardium: There is a small pericardial effusion    ·  Pericardium: There is no echocardiographic evidence of tamponade      Dysphagia  Assessment & Plan  Resume tube feeding    S/P percutaneous endoscopic gastrostomy (PEG) tube placement [facility])  Assessment & Plan  Will place on tube feeds  Start trickle feeds for now and advance as tolerated  Nutrition consultation placed  Has had PEG tube since 2018, secondary to lung cancer and cancer cachexia        Pericardial effusion  Assessment & Plan  Previous history of pericardial effusion, will obtain echocardiogram to evaluate if this is playing a role  With per last echocardiogram from [facility] this was resolved  There is no evidence of tamponade physiology    Diastolic dysfunction without heart failure  Assessment & Plan  Appears euvolemic  She is not on furosemide as an outpatient    Coronary artery disease involving native coronary artery of native heart without angina pectoris  Assessment & Plan  Continue beta-blocker, patient is off Lipitor    Moderate protein-calorie malnutrition (HCC)  Assessment & Plan  Malnutrition Findings:           BMI Findings: Body mass index is 19 74 kg/m²  Nutrition consultation placed    Centrilobular emphysema ([address] )  Assessment & Plan  Now resume inhalers as needed - no evidence of bronchospasm    Paroxysmal atrial fibrillation (HCC)  Assessment & Plan  Ventricular rate control  Continue digoxin as well as atenolol  Anticoagulated with Eliquis      VTE Prophylaxis: Apixaban (Eliquis)  / sequential compression device   Code Status:  DNR  POLST: There is no POLST form on file for this patient (pre-hospital)  Discussion with family:  Patient's son at 8:58 p m  Anticipated Length of Stay:  Patient will be admitted on an Observation basis with an anticipated length of stay of  less than 2 midnights  Justification for Hospital Stay:  Shortness of breath    Total Time for Visit, including Counseling / Coordination of Care: 30 minutes  Greater than 50% of this total time spent on direct patient counseling and coordination of care  Chief Complaint:   Short of breath and dizzy    History of Present Illness:    Amada Shabazz is a 78 y o  female who presents with shortness of breath which is worse with ambulation as well as vertigo  Patient has a history of super ventricular tachycardia as well as atrial fibrillation  She has a history of lung cancer and is on PEG feeding due to dysphagia    In the emergency room she reports no shortness of breath, she has not had any chest pain  She contacted her cardiologist was instructed to come to emergency room  During my examination she reports no complaints, she has no fevers or chills  No loss of sense of smell or taste  She has not had any recent contacts with anyone with COVID-19  She has no leg swelling  She is quite concerned about receiving her tube feeds and states he has not had her tube feeds in 24 hours  Review of Systems:    A complete and comprehensive 14 point organ system review was performed and all other systems are negative other than stated above in the HPI    Past Medical and Surgical History:     Past Medical History:   Diagnosis Date    Abnormal ECG     Arrhythmia     Asthma     Atrial fibrillation (HCC)     Autonomic dysfunction     Cancer (HCC)     lung, head and neck    Coronary artery disease     Disease of thyroid gland     Dysphagia     Gastroparesis     Heart murmur     mitral and aortic regurg    Hyperlipidemia     Hypertension     Irregular heart beat        Past Surgical History:   Procedure Laterality Date    CARDIAC CATHETERIZATION      CARDIAC LOOP RECORDER      ESOPHAGOGASTRODUODENOSCOPY W/ PEG      HEAD AND NECK DEBRIDEMENT      LUNG SURGERY Left     upper lobectomy       Meds/Allergies:    Prior to Admission medications    Medication Sig Start Date End Date Taking?  Authorizing Provider   apixaban (ELIQUIS) 2 5 mg Take 1 tablet (2 5 mg total) by mouth 2 (two) times a day 11/23/20 11/23/21 Yes Brian Barr MD   atenolol (TENORMIN) 25 mg tablet Take 12 5 mg by mouth daily 8/20/20  Yes Historical Provider, MD   atorvastatin (LIPITOR) 20 mg tablet atorvastatin 20 mg tablet   Yes Historical Provider, MD   digoxin (LANOXIN) 0 125 mg tablet Take 1 tablet (125 mcg total) by mouth every other day 11/23/20  Yes Brian Barr MD   levothyroxine 112 mcg tablet Take 112 mcg by mouth daily 100mg daily 8/19/20  Yes Historical Provider, MD   midodrine (PROAMATINE) 2 5 mg tablet  9/15/20   Historical Provider, MD     I have reviewed home medications with patient personally  Allergies: Allergies   Allergen Reactions    Albuterol Other (See Comments)     Other reaction(s): Other (See Comments)  Afib  Causes afib      Methylprednisolone Other (See Comments)     Dizziness   Patient states adverse effect, "i feel weird in the head"       Social History:     Marital Status: Unknown   Occupation:  Retired    Substance Use History:   Social History     Substance and Sexual Activity   Alcohol Use Not Currently     Social History     Tobacco Use   Smoking Status Former Smoker    Packs/day: 0 50    Years: 20 00    Pack years: 10 00   Smokeless Tobacco Never Used   Tobacco Comment    Quit at age 41     Social History     Substance and Sexual Activity   Drug Use Never       Family History:    Family History   Problem Relation Age of Onset    Lung cancer Sister        Physical Exam:     Vitals:   Blood Pressure: (!) 180/92 (03/15/21 1901)  Pulse: 75 (03/15/21 1901)  Temperature: 98 6 °F (37 °C) (03/15/21 1701)  Temp Source: Oral (03/15/21 1701)  Respirations: 16 (03/15/21 1901)  Height: 5' 5" (165 1 cm) (03/15/21 1701)  Weight - Scale: 53 8 kg (118 lb 9 7 oz) (03/15/21 1701)  SpO2: 100 % (03/15/21 1901)      General:  Appears chronically ill  HEENT: atraumatic, PERRLA, moist mucosa, normal pharynx, normal tonsils and adenoids, normal tongue, no fluid in sinuses  Neck: Trachea midline, no carotid bruit, no masses  Respiratory: normal chest wall expansion, CTA B, no r/r/w, no rubs  Cardiovascular:  Irregularly irregular  Abdomen: Soft, non-tender, non-distended, normal bowel sounds in all quadrants, no hepatosplenomegaly, no tympany  Rectal: deferred  Musculoskeletal: normal ROM in upper and lower extremities  Integumentary: warm, dry, and pink, with no rash, purpura, or petechia  Heme/Lymph: no lymphadenopathy, no bruises  Neurological: Cranial Nerves II-XII grossly intact  Psychiatric: cooperative with normal mood, affect, and cognition    Additional Data:     Lab Results: I have personally reviewed pertinent reports  Results from last 7 days   Lab Units 03/15/21  1846   WBC Thousand/uL 12 07*   HEMOGLOBIN g/dL 13 3   HEMATOCRIT % 41 0   PLATELETS Thousands/uL 328   NEUTROS PCT % 80*   LYMPHS PCT % 7*   MONOS PCT % 12   EOS PCT % 0     Results from last 7 days   Lab Units 03/15/21  1846   SODIUM mmol/L 140   POTASSIUM mmol/L 4 0   CHLORIDE mmol/L 101   CO2 mmol/L 30   BUN mg/dL 49*   CREATININE mg/dL 1 18   ANION GAP mmol/L 9   CALCIUM mg/dL 9 7   ALBUMIN g/dL 3 7   TOTAL BILIRUBIN mg/dL 0 39   ALK PHOS U/L 102   ALT U/L 45   AST U/L 25   GLUCOSE RANDOM mg/dL 89                       Imaging: I have personally reviewed pertinent reports  XR chest 2 views    (Results Pending)       EKG, Pathology, and Other Studies Reviewed on Admission:   · EKG, no EKG available for review at the time of my evaluation  Did personally review chest x-ray, with no evidence of disease of the chest    [employer] / [employer] Records Reviewed: Yes     ** Please Note: This note was completed in part utilizing "[employer] Direct Software  Grammatical errors, random word insertions, spelling mistakes, and incomplete sentences may be an occasional consequence of this system secondary to software limitations, ambient noise, and hardware issues  If you have any questions or concerns about the content, text, or information contained within the body of this dictation, please contact the provider for clarification  **

## 2021-03-16 NOTE — ASSESSMENT & PLAN NOTE
Appears euvolemic  She is not on furosemide as an outpatient  Close followup with cardiology is recommended

## 2021-03-16 NOTE — ASSESSMENT & PLAN NOTE
Previous history of pericardial effusion, will obtain echocardiogram to evaluate if this is playing a role  With per last echocardiogram from [facility] this was resolved  There is no evidence of tamponade physiology

## 2021-03-16 NOTE — ASSESSMENT & PLAN NOTE
Will place on tube feeds  Start trickle feeds for now and advance as tolerated  Nutrition consultation placed  Has had PEG tube since 2018, secondary to lung cancer and cancer cachexia

## 2021-03-16 NOTE — ASSESSMENT & PLAN NOTE
Previous history of pericardial effusion  Echo here LVEF 32%, grade 1 diastolic dyfunction, small pericardial effusion identified but no evidence of hemodynamic compromise  With per last echocardiogram from [facility] this was resolved  There is no evidence of tamponade physiology

## 2021-03-16 NOTE — ASSESSMENT & PLAN NOTE
Patient with shortness of breath and dizziness  Per emergency room would like admitted for echocardiogram in the morning  Elevated BNP  Will place Cardiology consultation  Serial cardiac enzymes to rule out myocardial ischemia, doubt ACS  BNP elevated minimally  Hx of pericardial effusion in the past - No evidence of tamponade physiology    Has multiple reasons to be short of breath these include emphysema, history of lung cancer, heart failure  Clinically she appears to have none of these    Recent Labs     03/15/21  1846   TROPONINI <0 02     [facility]  ·  Left Ventricle: There is mild concentric hypertrophy  ·  Left Ventricle: Systolic function is normal with an ejection fraction   of 55-60%  ·  Left Ventricle: Wall motion cannot be accurately assessed  ·  Left Ventricle: There is grade I (mild) diastolic dysfunction and   normal left atrial pressure  ·  Aortic Valve: The aortic valve is trileaflet  There is mild sclerosis  ·  Aortic Valve: There is mild regurgitation  ·  Mitral Valve: There is mild regurgitation  ·  Pericardium: There is a small pericardial effusion    ·  Pericardium: There is no echocardiographic evidence of tamponade

## 2021-03-16 NOTE — PHYSICAL THERAPY NOTE
PT EVALUATION    Pt  Name: Caro Cortes  Pt  Age: 78 y o  MRN: [id number]  LENGTH OF STAY: 0    Patient Active Problem List   Diagnosis    [facility]ox[facility] fibrillation ([facility]    History of lung cancer in adulthood    Supraventricular tachycardia ([facility] )    Centrilobular emphysema ([facility] )    History of lobectomy of lung    History of head and neck radiation    Moderate protein-calorie malnutrition (HCC)    Orthostatic hypotension    Coronary artery disease involving native coronary artery of native heart without angina pectoris    Mitral and aortic valve regurgitation    Diastolic dysfunction without heart failure    Pericardial effusion    S/P percutaneous endoscopic gastrostomy (PEG) tube placement ([facility] )    Dysphagia    Encounter for care related to feeding tube    Shortness of breath       Admitting Diagnoses:   Lightheadedness [R42]  Supraventricular tachycardia (HCC) [I47 1]  Paroxysmal atrial fibrillation ([facility] ) [I48 0]  SOB (shortness of breath) [R06 02]  Dyspnea on exertion [R06 00]  Moderate protein-calorie malnutrition ([facility] ) [E44 0]  History of lung cancer in adulthood [Z85 118]    Past Medical History:   Diagnosis Date    Abnormal ECG     Arrhythmia     Asthma     Atrial fibrillation (HCC)     Autonomic dysfunction     Cancer ([facility] )     lung, head and neck    Coronary artery disease     Disease of thyroid gland     Dysphagia     Gastroparesis     Heart murmur     mitral and aortic regurg    Hyperlipidemia     Hypertension     Irregular heart beat        Past Surgical History:   Procedure Laterality Date    CARDIAC CATHETERIZATION      CARDIAC LOOP RECORDER      ESOPHAGOGASTRODUODENOSCOPY W/ PEG      HEAD AND NECK DEBRIDEMENT      LUNG SURGERY Left     upper lobectomy       Imaging Studies:  XR chest 2 views   Final Result by Tate العراقي MD (03/15 2157)      No acute cardiopulmonary disease                  Workstation performed: [id number]              03/16/21 1039   PT Last Visit   PT Visit Date 03/16/21   Note Type   Note type Evaluation   Pain Assessment   Pain Assessment Tool Pain Assessment not indicated - pt denies pain   Pain Score No Pain   Home Living   Type of [address] One level;Performs ADLs on one level; Able to live on main level with bedroom/bathroom  (0STE)   Bathroom Shower/Tub Walk-in shower   Bathroom Toilet Raised   Bathroom Equipment Grab bars in shower; Shower chair   [facility]   Prior Function   Level of [place] Independent with ADLs and functional mobility  (w/o AD)   Lives With Alone   Receives Help From Other (Comment)  (Pt states limited social support)   ADL Assistance Independent   Falls in the last 6 months 0   Vocational Retired   Comments (+)    Restrictions/Precautions   [place] Bearing Precautions Per Order No   Other Precautions Telemetry; Fall Risk   General   Family/Caregiver Present No   Cognition   Overall Cognitive Status WFL   Arousal/Participation Alert   Orientation Level Oriented X4   Following Commands Follows multistep commands without difficulty   Comments Cooperative and pleasant   RUE Assessment   RUE Assessment WFL  (4/5 grossly)   LUE Assessment   LUE Assessment WFL  (4/5 grossly)   RLE Assessment   RLE Assessment WFL  (4/5 grossly)   LLE Assessment   LLE Assessment WFL  (4/5 grossly)   Coordination   Sensation WFL   Bed Mobility   Supine to Sit 7  Independent   Sit to Supine 7  Independent   Additional Comments Demonstrated proper techniques and safety   Transfers   Sit to Stand 5  Supervision   Additional items Increased time required;Verbal cues   Stand to Sit 5  Supervision   Additional items Armrests; Increased time required;Verbal cues   Additional Comments Cues for technique and safety   Ambulation/Elevation   Gait pattern Decreased foot clearance; Short stride; Excessively slow  (Slight limp)   Gait Assistance 4  Minimal assist   Additional items Assist x 1; Tactile cues; Verbal cues  (HHA)   Assistive Device None  (HHA)   Distance 50'x1; 50'x1; 50'x1  (required seated rest breaks between trials )   Balance   Static Sitting Normal   Dynamic Sitting Good   Static Standing Fair   Dynamic Standing Fair -   Ambulatory Fair -  (w/ HHA)   Endurance Deficit   Endurance Deficit Yes   Endurance Deficit Description fatigue; dizziness   Activity Tolerance   Activity Tolerance Patient limited by fatigue   Nurse Made Aware ALEX Hoang   Prognosis Good   Problem List Decreased strength;Decreased endurance; Impaired balance;Decreased mobility   Assessment  Pt  81 y  o female presents with SOB and dizziness  Past medical hx includes lung CA, PEG tube since 2018, supraventricular tachycardia, and Paroxysmal atrial fibrillation  Pt admitted for Shortness of breath w/ Lightheadedness, Dyspnea on exertion, Moderate protein-calorie malnutrition  Pt referred to PT for functional mobility evaluation & D/C planning w/ orders of ambulate patient  PTA, pt reports being I w/o AD  During evaluation, deficits included dec mobility, balance, ambulation  Pt demonstrated dec endurance and tolerance to activity  Evaluation of functional mobility required independent w/ bed mobility; S for transfers; minAx1 w/ HHA for amb  Pt was able to ambulate 3 trials of 48' w/ HHA  Seated rest breaks provided between trials 2* inc fatigue and dizziness Gait deviations as above, slow w/ a slight limp but no gross LOB noted  BP t/o session as follows: /55; seated post amb 119/70; seated in chair post session 119/67  SpO2 was maintained at 97-99% on RA  Nsg staff most recent vital signs as follows: /71 (BP Location: Left arm)   Pulse 78   Temp 97 6 °F (36 4 °C) (Temporal)   Resp 18   Ht 5' 5" (1 651 m)   Wt 53 6 kg (118 lb 2 7 oz)   SpO2 96%   BMI 19 66 kg/m²   At end of session, pt OOB in chair in stable condition, call bell & phone in reach   Pt was educated on fall precautions and reinforced w/ good understanding  Pt would benefit from continued PT to address deficits as defined above and maximize level of independence with functional mobility and safety  The patient's AM-PAC Basic Mobility Inpatient Short Form Raw Score is 20, Standardized Score is 43 99  A standardized score greater than 42 9 suggests the patient may benefit from discharge to home  Please also refer to the recommendation of the Physical Therapist for safe discharge planning  From PT/mobility standpoint recommendation for D/C home w/ HHPT and inc social support, when medically cleared based on objective measures above, current function, and dec family/caregiver support  CM to follow  Nsg staff to continue to mobilized pt (OOB in chair for all meals & ambulate in room/unit) as tolerated to prevent further decline in function  Nsg notified  Barriers to Discharge Decreased caregiver support   Barriers to Discharge Comments Lives alone   Goals   Patient Goals To go home   STG Expiration Date 03/26/21   Short Term Goal #1 1) Inc overall LE strength by 1/2 MMT grade to improve functional mobility; 2) Pt will demonstrate improved transfers independently for inc safety; 3) Pt will be able to amb w/ S >150' w/o AD for household distances to inc safety and dec caregiver burden; 4) Improve general balance by 1 grade to inc safety; 5) PT for ongoing patient and caregiver education    PT Treatment Day 0   Plan   Treatment/Interventions Functional transfer training;LE strengthening/ROM; Therapeutic exercise; Endurance training;Patient/family training;Gait training;Bed mobility;Spoke to nursing   PT Frequency Other (Comment)  (3-5x/wk)   Recommendation   PT Discharge Recommendation Home with skilled therapy; Return to previous environment with social support  (HHPT w/ inc social support)   [place] 8 in Bed Without Bedrails 4   Lying on Back to Sitting on Edge of Flat Bed 4   Moving Bed to Chair 3 Standing Up From Chair 3   Walk in Room 3   Climb 3-5 Stairs 3   Basic Mobility Inpatient Raw Score 20   Basic Mobility Standardized Score 43 99   Hx/personal factors: co-morbidities, dec caregiver support, home alone, advanced age, telemetry and fall risk  Examination: dec mobility, dec balance, dec endurance, dec amb, risk for falls  Clinical: unpredictable (ongoing medical status, abnormal lab values and risk for falls)  Complexity: high     Noreen Amy

## 2021-03-16 NOTE — CONSULTS
Consult: TF recommendations  PT reported recieving TF for 3 years, takes in small amount of liquids orally such as coffee and water  PT on 2cal HN boluses 1 can (237mL) 4 x day (8am , 12pm , 5pm , 9pm), PT didn't specify water flushes  PT reported good tolerance to TF and no weight change concerns  Reviewed weight hx records: 3/16/21 53 6kg, 3/04/21 54 4 kg, 2/05/21 54 kg, 1/15/21 54 kg, 12/23/20 54 kg, 10/19/20 54 9 kg, 09/17/20 54 kg, weights appear to be stable  Recommend TF + oral diet clear liquids as pleasure feeds, continue with home TF regimen 2cal HN 237mL 4 x day (8am, 12pm, 5pm, 9pm), water flushes 100ml before and after each bolus provides total of 1896cal, 79g pro, 1464mL  Will monitor labs, weight and need for TF adjustment

## 2021-03-18 NOTE — PROGRESS NOTES
Patient called   Earlier today requesting to change  Atenolol to metoprolol if possible  I tried calling back patient but was unable to reach her  I left a message  I advised her that would be safer to go on bisoprolol given her history of lung disease  I am sending a prescription for bisoprolol 2 5 mg once daily to her pharmacy  I also call her pulmonologist office and they are trying to see the can schedule an appointment for her to be evaluated by pulmonologist the near future      Dalia Floyd MD

## 2021-03-18 NOTE — TELEPHONE ENCOUNTER
Pt called wants to know if can switch from atenolol to metoprolol  Please call patient she just left message

## 2021-04-08 NOTE — PROGRESS NOTES
CARELINK TRANSMISSION: LOOP RECORDER  PRESENTING RHYTHM NSR @ 74 BPM  BATTERY STATUS "OK"  NO PATIENT OR DEVICE ACTIVATED EPISODES  NORMAL DEVICE FUNCTION   DL

## 2021-04-26 PROBLEM — I36.1 NONRHEUMATIC TRICUSPID VALVE REGURGITATION: Status: ACTIVE | Noted: 2021-01-01

## 2021-04-26 NOTE — PATIENT INSTRUCTIONS
CARDIOLOGY ASSESSMENT & PLAN:  1  Paroxysmal atrial fibrillation (HCC)  POCT ECG   2  Supraventricular tachycardia (HCC)  POCT ECG   3  Mitral and aortic valve regurgitation     4  Nonrheumatic tricuspid valve regurgitation     5  Diastolic dysfunction without heart failure       Paroxysmal atrial fibrillation [facility])  Mrs Dez Ramsey appears to be overall stable from cardiac perspective  She did have an atrial fibrillation episode last night when she was in bed  She denies any trigger that could have precipitated yesterday's event  She is tolerating bisoprolol therapy with no symptoms that suggest orthostatic hypotension  Her recent cardiac MR showed normal left and right heart function with no evidence of pulmonary hypertension  There was moderate tricuspid valve and mild aortic valve regurgitation  Recent digoxin level was in normal range at 1 0     -- at this time we are continuing her current medications  -- if she is noted to have frequent atrial fibrillation episodes than will consider antiarrhythmic therapy with amiodarone to maintain sinus rhythm  -- encouraged her to continue normal activities as much as tolerated  -- we will follow-up with her in 6 months time  The following routine measures are being advised to prevent exacerbation of congestive heart failure:     - Daily or atleast weekly checking of weight  Notify your clinicians if greater than 2 lb is gained in 1 day or greater than 5 lb is gained in 1 wk  - Checking for lower extremity swelling  Examine legs for swelling (new or increase in existing swelling) and describe if swelling reaches ankle, shin, or knee  - Monitoring for decrease in exercise tolerance  Check your self for unusual shortness of breath at rest, or with mild exertion, moderate exertion, or none at all     - Monitoring for worsening shortness of breath on lying down      Check for increase in number of pillows used at night and for increase in waking at night with shortness of breath  - Salt/sodium restriction to less than 2 g a day  Calculate total sodium intake in a day from nutrition labels and food charts  Understand hidden sources of salt intake   Eliminate the salt shaker  (Remember, One teaspoon of table salt = 2,300 mg of sodium)    Avoid using garlic salt, onion salt, MSG, meat tenderizers, broth mixes, [place] food, soy sauce, teriyaki sauce, barbeque sauce, sauerkraut, olives, pickles, pickle relish, goel bits, and croutons    Use fresh ingredients and/or foods with no added salt   Try orange, lemon, lime, pineapple juice, or vinegar as a base for meat marinades or to add tart flavor   Avoid convenience foods such as canned soups, entrees, vegetables, pasta and rice mixes, frozen dinners, instant cereal and puddings, and gravy sauce mixes    Select frozen meals that contain around 600 mg sodium or less   Use fresh, frozen, no-added-salt canned vegetables, low-sodium soups, and low-sodium lunchmeats   Look for seasoning or spice blends with no salt, or try fresh herbs, onions, or garlic  You are advised to inform us for any concerns regarding above measures

## 2021-04-26 NOTE — ASSESSMENT & PLAN NOTE
Mrs Armin Nicholson appears to be overall stable from cardiac perspective  She did have an atrial fibrillation episode last night when she was in bed  She denies any trigger that could have precipitated yesterday's event  She is tolerating bisoprolol therapy with no symptoms that suggest orthostatic hypotension  Her recent cardiac MR showed normal left and right heart function with no evidence of pulmonary hypertension  There was moderate tricuspid valve and mild aortic valve regurgitation  Recent digoxin level was in normal range at 1 0     -- at this time we are continuing her current medications  -- if she is noted to have frequent atrial fibrillation episodes than will consider antiarrhythmic therapy with amiodarone to maintain sinus rhythm  -- encouraged her to continue normal activities as much as tolerated  -- we will follow-up with her in 6 months time  The following routine measures are being advised to prevent exacerbation of congestive heart failure:     - Daily or atleast weekly checking of weight  Notify your clinicians if greater than 2 lb is gained in 1 day or greater than 5 lb is gained in 1 wk  - Checking for lower extremity swelling  Examine legs for swelling (new or increase in existing swelling) and describe if swelling reaches ankle, shin, or knee  - Monitoring for decrease in exercise tolerance  Check your self for unusual shortness of breath at rest, or with mild exertion, moderate exertion, or none at all     - Monitoring for worsening shortness of breath on lying down  Check for increase in number of pillows used at night and for increase in waking at night with shortness of breath  - Salt/sodium restriction to less than 2 g a day  Calculate total sodium intake in a day from nutrition labels and food charts  Understand hidden sources of salt intake   Eliminate the salt shaker   (Remember, One teaspoon of table salt = 2,300 mg of sodium)    Avoid using garlic salt, onion salt, MSG, meat tenderizers, broth mixes, [place] food, soy sauce, teriyaki sauce, barbeque sauce, sauerkraut, olives, pickles, pickle relish, goel bits, and croutons    Use fresh ingredients and/or foods with no added salt   Try orange, lemon, lime, pineapple juice, or vinegar as a base for meat marinades or to add tart flavor   Avoid convenience foods such as canned soups, entrees, vegetables, pasta and rice mixes, frozen dinners, instant cereal and puddings, and gravy sauce mixes    Select frozen meals that contain around 600 mg sodium or less   Use fresh, frozen, no-added-salt canned vegetables, low-sodium soups, and low-sodium lunchmeats   Look for seasoning or spice blends with no salt, or try fresh herbs, onions, or garlic  You are advised to inform us for any concerns regarding above measures

## 2021-04-26 NOTE — PROGRESS NOTES
[facility]  [id number] [facility], [place] [address]  Phone#  [phone]  Fax#  [phone]  *-*-*-*-*-*-*-*-*-*-*-*-*-*-*-*-*-*-*-*-*-*-*-*-*-*-*-*-*-*-*-*-*-*-*-*-*-*-*-*-*-*-*-*-*-*-*-*-*-*-*-*-*-*  Burman Schwab DATE: 04/26/21 2:06 PM  PATIENT NAME: Lamin Jose   1940    [id number]  Age: 78 y o  Sex: female  AUTHOR: Brian Barr MD  PRIMARYCARE PHYSICIAN: Leno Linn DO    DIAGNOSES:  1  Coronary artery disease with nonobstructive CAD identified in 2015, 60% disease in LAD  2  Paroxysmal atrial fibrillation diagnosed in the remote past and noted to have recurrent bouts in the last few years  3  Essential hypertension  4  Orthostatic hypotension  5  Premature ventricular contractions  6  History of lung carcinoma stage IB (January 2013) status post left upper lobectomy  7  Head and neck cancer April 2015, status s/p radiation therapy  7  Monoclonal gammopathy of unknown significance (MGUS)  9  History of dysphagia and gastroparesis, status post SPECT to placement in 2018  10  Status post loop recorder implantation May 2019  11  Hypothyroidism  12  Mixed dyslipidemia  13  Autonomic dysfunction    CARDIAC MR April 13, 2021:  Left ventricle: Left ventricle demonstrates normal size and systolic function,  ejection fraction is 69%  Ventricular septum measures 6 mm thickness, posterior  inferior wall measures 6 mm  There is slight heterogeneous abnormal enhancement  mid inferior which likely represents artifact  There are no findings suggesting  amyloidosis or scar      Right ventricle: Normal, with ejection fraction 53%      Left atrium: Normal   Right atrium: Mildly enlarged     Aorta and great vessels: Normal   Pulmonary artery: Normal   Pericardium: Normal   Veins: Normal     Valves: There is moderate tricuspid regurgitation with eccentric jet  There is  mild aortic insufficiency         [facility], [facility]:  ·  Left Ventricle:  There is mild concentric hypertrophy  ·  Left Ventricle: Systolic function is normal with an ejection fraction   of 55-60%  ·  Left Ventricle: Wall motion cannot be accurately assessed  ·  Left Ventricle: There is grade I (mild) diastolic dysfunction and   normal left atrial pressure  ·  Aortic Valve: The aortic valve is trileaflet  There is mild sclerosis  ·  Aortic Valve: There is mild regurgitation  ·  Mitral Valve: There is mild regurgitation  ·  Pericardium: There is a small pericardial effusion  ·  Pericardium: There is no echocardiographic evidence of tamponade    CURRENT ECG:  Results for orders placed or performed in visit on 04/26/21   POCT ECG    Narrative    Sinus rhythm, HR 67 beats per minute, low QRS voltages, normal axis, normal intervals, right atrial enlargement, possible prior anteroseptal infarction, no significant ST T-wave abnormalities  ECG from loop recorder transmission from last night indicating episode of atrial fibrillation for 46 minutes  CARDIOLOGY ASSESSMENT & PLAN:  1  Paroxysmal atrial fibrillation (HCC)  POCT ECG   2  Supraventricular tachycardia (HCC)  POCT ECG   3  Mitral and aortic valve regurgitation     4  Nonrheumatic tricuspid valve regurgitation     5  Diastolic dysfunction without heart failure       Paroxysmal atrial fibrillation [facility])  Mrs Maximiliano Francis appears to be overall stable from cardiac perspective  She did have an atrial fibrillation episode last night when she was in bed  She denies any trigger that could have precipitated yesterday's event  She is tolerating bisoprolol therapy with no symptoms that suggest orthostatic hypotension  Her recent cardiac MR showed normal left and right heart function with no evidence of pulmonary hypertension  There was moderate tricuspid valve and mild aortic valve regurgitation  Recent digoxin level was in normal range at 1 0     -- at this time we are continuing her current medications    -- if she is noted to have frequent atrial fibrillation episodes than will consider antiarrhythmic therapy with amiodarone to maintain sinus rhythm  -- encouraged her to continue normal activities as much as tolerated  -- we will follow-up with her in 6 months time  The following routine measures are being advised to prevent exacerbation of congestive heart failure:     - Daily or atleast weekly checking of weight  Notify your clinicians if greater than 2 lb is gained in 1 day or greater than 5 lb is gained in 1 wk  - Checking for lower extremity swelling  Examine legs for swelling (new or increase in existing swelling) and describe if swelling reaches ankle, shin, or knee  - Monitoring for decrease in exercise tolerance  Check your self for unusual shortness of breath at rest, or with mild exertion, moderate exertion, or none at all     - Monitoring for worsening shortness of breath on lying down  Check for increase in number of pillows used at night and for increase in waking at night with shortness of breath  - Salt/sodium restriction to less than 2 g a day  Calculate total sodium intake in a day from nutrition labels and food charts  Understand hidden sources of salt intake   Eliminate the salt shaker  (Remember, One teaspoon of table salt = 2,300 mg of sodium)    Avoid using garlic salt, onion salt, MSG, meat tenderizers, broth mixes, [place] food, soy sauce, teriyaki sauce, barbeque sauce, sauerkraut, olives, pickles, pickle relish, goel bits, and croutons    Use fresh ingredients and/or foods with no added salt   Try orange, lemon, lime, pineapple juice, or vinegar as a base for meat marinades or to add tart flavor   Avoid convenience foods such as canned soups, entrees, vegetables, pasta and rice mixes, frozen dinners, instant cereal and puddings, and gravy sauce mixes    Select frozen meals that contain around 600 mg sodium or less     Use fresh, frozen, no-added-salt canned vegetables, low-sodium soups, and low-sodium lunchmeats   Look for seasoning or spice blends with no salt, or try fresh herbs, onions, or garlic  You are advised to inform us for any concerns regarding above measures  INTERVAL HISTORY & HISTORY OF PRESENT ILLNESS:  Leona Galo is here for follow-up regarding her cardiac comorbidities which include: Paroxysmal atrial fibrillation, orthostatic hypotension, essential hypertension, coronary artery disease and other comorbidities  She was recently seen by me on 4th of March for her occasional palpitations and labile blood pressures  Eventually we had discontinued her atenolol and started on bisoprolol  Today she mentions that overall she is feeling better with better controlled blood pressures  She did have palpitation episode last night  We did receive transmission and EGM does show atrial fibrillation rhythm for 46 minutes  Patient reports feeling tachycardic with heart rate in 110s for some time  Symptoms gradually resolved  Denies orthopnea, PND or pedal edema  Reports being compliant with medications  Denies any bleeding or bruising  Functional capacity status: Moderate to good   (Excellent- >10 METs; Good: (7-10 METs); Moderate (4-7 METs); Poor (<= 4 METs)    Any chronic stressors:  None   (feeling of poor health, financial problems, and social isolation etc)  Tobacco or alcohol dependence:  None    She mentions that her oncologist's has recently worked up for a myeloid axis and the tests came back as negative  She also had a cardiac MR performed recently which was significant for no evidence of scar or a myeloid axis, normal left ventricular size and function, moderate tricuspid valve regurgitation and mild mitral valve regurgitation    REVIEW OF SYMPTOMS:    Positive for:  Intermittent palpitations, overall better  Negative for: All remaining as reviewed below and in HPI      SYSTEM SYMPTOMS REVIEWED:  General--weight change, fever, night sweats  Respiratory--cough, wheezing, shortness of breath, sputum production  Cardiovascular--chest pain, syncope, dyspnea on exertion, edema, decline in exercise tolerance, claudication   Gastrointestinal--persistent vomiting, diarrhea, abdominal distention, blood in stool   Muscular or skeletal--joint pain or swelling   Neurologic--headaches, syncope, abnormal movement  Hematologic--history of easy bruising and bleeding   Endocrine--thyroid enlargement, heat or cold intolerance, polyuria   Psychiatric--anxiety, depression     *-*-*-*-*-*-*-*-*-*-*-*-*-*-*-*-*-*-*-*-*-*-*-*-*-*-*-*-*-*-*-*-*-*-*-*-*-*-*-*-*-*-*-*-*-*-*-*-*-*-*-*-*-*-  VITAL SIGNS:  Vitals:    04/26/21 1333   BP: 140/70   Pulse: 67   Weight: 54 4 kg (120 lb)   Height: 5' 5" (1 651 m)     Weight (last 2 days)     Date/Time   Weight    04/26/21 1333   54 4 (120)           ,   Wt Readings from Last 3 Encounters:   04/26/21 54 4 kg (120 lb)   04/16/21 55 3 kg (122 lb)   03/16/21 53 6 kg (118 lb 2 7 oz)    , Body mass index is 19 97 kg/m²  *-*-*-*-*-*-*-*-*-*-*-*-*-*-*-*-*-*-*-*-*-*-*-*-*-*-*-*-*-*-*-*-*-*-*-*-*-*-*-*-*-*-*-*-*-*-*-*-*-*-*-*-*-*-  PHYSICAL EXAM:  General Appearance:    Alert, cooperative, no distress, appears stated age   Head, Eyes, ENT:    Some puffiness of face   Neck:   Supple, no carotid bruit or JVD   Back:     Symmetric, no curvature  Lungs:     Respirations unlabored  Clear to auscultation bilaterally,    Chest wall:    No tenderness or deformity   Heart:    Regular rate and rhythm, S1 and S2 normal, no murmur, rub  or gallop     Abdomen:     Soft, non-tender, has PEG tube   Extremities:   Extremities warm, no cyanosis or edema    Skin:   Skin color, texture, turgor normal, no rashes or lesions     *-*-*-*-*-*-*-*-*-*-*-*-*-*-*-*-*-*-*-*-*-*-*-*-*-*-*-*-*-*-*-*-*-*-*-*-*-*-*-*-*-*-*-*-*-*-*-*-*-*-*-*-*-*-  CURRENT MEDICATION LIST:    Current Outpatient Medications:     apixaban (ELIQUIS) 2 5 mg, Take 1 tablet (2 5 mg total) by mouth 2 (two) times a day, Disp: 180 tablet, Rfl: 3    Ascorbic Acid (vitamin C) 100 MG tablet, Take 100 mg by mouth daily, Disp: , Rfl:     atorvastatin (LIPITOR) 20 mg tablet, atorvastatin 20 mg tablet, Disp: , Rfl:     bisoprolol (ZEBETA) 5 mg tablet, Take 0 5 tablets (2 5 mg total) by mouth daily, Disp: 15 tablet, Rfl: 3    cholecalciferol (VITAMIN D3) 400 units tablet, Take 400 Units by mouth daily, Disp: , Rfl:     digoxin (LANOXIN) 0 125 mg tablet, Take 1 tablet (125 mcg total) by mouth every other day, Disp: 45 tablet, Rfl: 3    levothyroxine 112 mcg tablet, Take 112 mcg by mouth daily 100mg daily, Disp: , Rfl:     pyridoxine (VITAMIN B6) 100 mg tablet, Take 100 mg by mouth daily, Disp: , Rfl:     ALLERGIES:  Allergies   Allergen Reactions    Albuterol Other (See Comments)     Other reaction(s): Other (See Comments)  Afib  Causes afib      Methylprednisolone Other (See Comments)     Dizziness  Patient states adverse effect, "i feel weird in the head"       *-*-*-*-*-*-*-*-*-*-*-*-*-*-*-*-*-*-*-*-*-*-*-*-*-*-*-*-*-*-*-*-*-*-*-*-*-*-*-*-*-*-*-*-*-*-*-*-*-*-*-*-*-*-  The LABORATORY DATA:  I have personally reviewed pertinent labs      No results found for: NA  Potassium   Date Value Ref Range Status   03/16/2021 4 3 3 5 - 5 3 mmol/L Final   03/15/2021 4 0 3 5 - 5 3 mmol/L Final     Chloride   Date Value Ref Range Status   03/16/2021 103 100 - 108 mmol/L Final   03/15/2021 101 100 - 108 mmol/L Final     CO2   Date Value Ref Range Status   03/16/2021 26 21 - 32 mmol/L Final   03/15/2021 30 21 - 32 mmol/L Final     BUN   Date Value Ref Range Status   03/16/2021 44 (H) 5 - 25 mg/dL Final   03/15/2021 49 (H) 5 - 25 mg/dL Final     Creatinine   Date Value Ref Range Status   03/16/2021 0 85 0 60 - 1 30 mg/dL Final     Comment:     Standardized to IDMS reference method   03/15/2021 1 18 0 60 - 1 30 mg/dL Final     Comment:     Standardized to IDMS reference method     eGFR   Date Value Ref Range Status   03/16/2021 64 ml/min/1 73sq m Final   03/15/2021 43 ml/min/1 73sq m Final     Calcium   Date Value Ref Range Status   03/16/2021 9 4 8 3 - 10 1 mg/dL Final   03/15/2021 9 7 8 3 - 10 1 mg/dL Final     AST   Date Value Ref Range Status   03/15/2021 25 5 - 45 U/L Final     Comment:     Specimen collection should occur prior to Sulfasalazine administration due to the potential for falsely depressed results  ALT   Date Value Ref Range Status   03/15/2021 45 12 - 78 U/L Final     Comment:     Specimen collection should occur prior to Sulfasalazine administration due to the potential for falsely depressed results  Alkaline Phosphatase   Date Value Ref Range Status   03/15/2021 102 46 - 116 U/L Final     Magnesium   Date Value Ref Range Status   03/15/2021 2 3 1 6 - 2 6 mg/dL Final     WBC   Date Value Ref Range Status   03/16/2021 11 17 (H) 4 31 - 10 16 Thousand/uL Final   03/15/2021 12 07 (H) 4 31 - 10 16 Thousand/uL Final     Hemoglobin   Date Value Ref Range Status   03/16/2021 13 1 11 5 - 15 4 g/dL Final   03/15/2021 13 3 11 5 - 15 4 g/dL Final     Platelets   Date Value Ref Range Status   03/16/2021 312 149 - 390 Thousands/uL Final   03/15/2021 328 149 - 390 Thousands/uL Final     No results found for: PT, PTT, INR  Digoxin Lvl   Date Value Ref Range Status   03/16/2021 1 0 0 8 - 2 0 ng/mL Final     No results found for: TSH  No results found for: CHOL   No results found for: HGBA1C  No results found for: Bandar Reeve, GRAMSTAIN, URINECX, WOUNDCULT, BODYFLUIDCUL, MRSACULTURE, INFLUAPCR, INFLUBPCR, RSVPCR, LEGIONELLAUR, CDIFFTOXINB    *-*-*-*-*-*-*-*-*-*-*-*-*-*-*-*-*-*-*-*-*-*-*-*-*-*-*-*-*-*-*-*-*-*-*-*-*-*-*-*-*-*-*-*-*-*-*-*-*-*-*-*-*-*-  PREVIOUS CARDIOLOGY & RADIOLOGY RESULTS:  Results for orders placed during the hospital encounter of 03/15/21   Echo complete with contrast if indicated    Narrative [facility]  Mari Fitzgerald [address] [place] [id number]  [phone]    Transthoracic Echocardiogram    Study date:  16-Mar-2021    Patient: Anh Velásquez  MR number: [id number]  Account number: [de-identified]  : 1940  Age: 78 years  Gender: Female  Status: Outpatient  Location: Bedside  Height: 65 in  Weight: 117 7 lb  BP: 136/ 71 mmHg    Indications: abnormal heart sound  Diagnoses: R01 1 - Cardiac murmur, unspecified    Sonographer:  [facility]  Primary Physician:  Alecia York DO  Referring Physician:  Jaci Arellano Do  Group:  [facility]  Interpreting Physician:  ACE Larry     SUMMARY    LEFT VENTRICLE:  Systolic function was normal by visual assessment  Ejection fraction was estimated to be 60 %  There were no regional wall motion abnormalities  Doppler parameters were consistent with abnormal left ventricular relaxation (grade 1 diastolic dysfunction)  MITRAL VALVE:  There was mild regurgitation  AORTIC VALVE:  There was mild regurgitation  AI  ms  TRICUSPID VALVE:  There was mild regurgitation  Estimated peak PA pressure was 25 mmHg  PULMONIC VALVE:  There was trace regurgitation  IVC, HEPATIC VEINS:  Respirophasic changes were normal  Estimated right atrial pressure of 3 mmHg, Estimated PASP 25 mmHg, Estimated PCWP of 11 4 mmHg (by E/e')  PERICARDIUM:  A small pericardial effusion was identified circumferential to the heart  There was no evidence of hemodynamic compromise  SUMMARY MEASUREMENTS  CW measurements:  Unspecified Anatomy:   AR Dec [place] was 1 9 m/s2  AR Dec Time was 1931 8 ms  AR PHT was 560 2 ms  AR Vmax was 3 7 m/s  AR maxPG was 54 mmHg  MV VTI was 18 3 cm  MV Vmax was 1 1 m/s  MV Vmean was 0 5 m/s  MV maxPG was 4 8 mmHg  MV  meanPG was 1 2 mmHg  PRend PG was 5 mmHg  PRend Vmax was 1 1 m/s  TR Vmax was 2 3 m/s  TR maxPG was 21 9 mmHg  MM measurements:  Unspecified Anatomy:   TAPSE was 3 4 cm    PW measurements:  Unspecified Anatomy:   E' Avg was 0 1 m/s  E' Lat was 0 1 m/s  E' Sept was 0 1 m/s  E/E' Avg was 7 3   E/E' Lat was 8 3   E/E' Sept was 6 5   LVOT Env  Ti was 284 5 ms  LVOT VTI was 16 2 cm  LVOT Vmax was 0 8 m/s  LVOT Vmean was  0 6 m/s  LVOT maxPG was 2 9 mmHg  LVOT meanPG was 1 5 mmHg  LVSI Dopp was 47 6 ml/m2  LVSV Dopp was 75 2 ml   MV A [place]aya was 0 8 m/s  MV Dec [place] was 1 7 m/s2  MV DecT was 295 9 ms   MV E Yaya was 0 4 m/s  MV E/A Ratio was 0 5   MVA  (VTI) was 4 1 cm2   TV E' lat was 0 1 m/s  HISTORY: PRIOR HISTORY: shortness of breath, coronary artery disease, arrhythmia, atrial fibrillation, cardiac murmur, hyperlipidemia, hypertension  PROCEDURE: The procedure was performed at the bedside  This was a stat study  The transthoracic approach was used  The heart rate was 79 bpm, at the start of the study  Images were obtained from the parasternal, apical, subcostal, and  suprasternal notch acoustic windows  Image quality was adequate  LEFT VENTRICLE: Size was normal  Systolic function was normal by visual assessment  Ejection fraction was estimated to be 60 %  There were no regional wall motion abnormalities  Wall thickness was normal  DOPPLER: Doppler parameters were  consistent with abnormal left ventricular relaxation (grade 1 diastolic dysfunction)  RIGHT VENTRICLE: The size was normal  Systolic function was normal  Wall thickness was normal     LEFT ATRIUM: Size was normal     RIGHT ATRIUM: Size was normal     MITRAL VALVE: Valve structure was normal  There was normal leaflet separation  Not well visualized  DOPPLER: The transmitral velocity was within the normal range  There was no evidence for stenosis  There was mild regurgitation  AORTIC VALVE: The valve was trileaflet  Leaflets exhibited normal thickness, mild calcification, and normal cuspal separation  DOPPLER: Transaortic velocity was within the normal range  There was no evidence for stenosis  There was mild  regurgitation   AI  ms  The regurgitant jet was directed centrally  TRICUSPID VALVE: The valve structure was normal  There was normal leaflet separation  DOPPLER: The transtricuspid velocity was within the normal range  There was no evidence for stenosis  There was mild regurgitation  Estimated peak PA  pressure was 25 mmHg  PULMONIC VALVE: Leaflets exhibited normal thickness, no calcification, and normal cuspal separation  DOPPLER: The transpulmonic velocity was within the normal range  There was trace regurgitation  PERICARDIUM: A small pericardial effusion was identified circumferential to the heart  There was no evidence of hemodynamic compromise  AORTA: The root exhibited normal size  SYSTEMIC VEINS: IVC: The inferior vena cava was normal in size and course  Respirophasic changes were normal  Estimated right atrial pressure of 3 mmHg, Estimated PASP 25 mmHg, Estimated PCWP of 11 4 mmHg (by E/e')      SYSTEM MEASUREMENT TABLES    2D  %FS: 37 4 %  Ao Diam: 2 9 cm  EDV(Teich): 42 1 ml  EF(Teich): 68 8 %  ESV(Teich): 13 1 ml  IVSd: 0 9 cm  LA Diam: 2 6 cm  LAAs A2C: 10 9 cm2  LAAs A4C: 13 8 cm2  LAESV A-L A2C: 22 3 ml  LAESV A-L A4C: 39 5 ml  LAESV Index (A-L): 19 7 ml/m2  LAESV MOD A2C: 20 4 ml  LAESV MOD A4C: 31 4 ml  LAESV(A-L): 31 1 ml  LAESV(MOD BP): 26 4 ml  LAESVInd MOD BP: 16 7 ml/m2  LALs A2C: 4 5 cm  LALs A4C: 4 1 cm  LVEDV MOD A4C: 53 7 ml  LVEF MOD A4C: 62 6 %  LVESV MOD A4C: 20 1 ml  LVIDd: 3 2 cm  LVIDs: 2 cm  LVLd A4C: 7 1 cm  LVLs A4C: 5 8 cm  LVOT Diam: 2 4 cm  LVPWd: 0 9 cm  RA Major: 5 3 cm  RA Minor: 3 cm  RV Major: 7 2 cm  RV Minor: 1 8 cm  RV base: 2 5 cm  RVIDd: 2 5 cm  SV MOD A4C: 33 6 ml  SV(Teich): 29 ml    CW  AR Dec [place]: 1 9 m/s2  AR Dec Time: 193 8 ms  AR PHT: 560 2 ms  AR Vmax: 3 7 m/s  AR maxP mmHg  MV VTI: 18 3 cm  MV Vmax: 1 1 m/s  MV Vmean: 0 5 m/s  MV maxP 8 mmHg  MV meanP 2 mmHg  PRend P mmHg  PRend Vmax: 1 1 m/s  TR Vmax: 2 3 m/s  TR maxP 9 mmHg    MM  TAPSE: 3 4 cm    PW  E' Av 1 m/s  E' Lat: 0 1 m/s  E' Sept: 0 1 m/s  E/E' Av 3  E/E' Lat: 8 3  E/E' Sept: 6 5  LVOT Env  Ti: 284 5 ms  LVOT VTI: 16 2 cm  LVOT Vmax: 0 8 m/s  LVOT Vmean: 0 6 m/s  LVOT maxP 9 mmHg  LVOT meanP 5 mmHg  LVSI Dopp: 47 6 ml/m2  LVSV Dopp: 75 2 ml  MV A Yaya: 0 8 m/s  MV Dec [place]: 1 7 m/s2  MV DecT: 295 9 ms  MV E Yaya: 0 4 m/s  MV E/A Ratio: 0 5  MVA (VTI): 4 1 cm2  TV E' lat: 0 1 m/s    [facility] Accredited Echocardiography Laboratory    Prepared and electronically signed by    ACE Clay  Signed 16-Mar-2021 10:34:57       No results found for this or any previous visit  No results found for this or any previous visit  No results found for this or any previous visit  Cardiac EP device report  NB/PT CONCERN-CARELINK TRANSMISSION: BATTERY STATUS "OK"  1 AF NOTED; 0 2% BURDEN  [address] AF  PT SYMPTOMATIC  PT ON ELIQUIS, DIGOXIN, ZEBETA  NORMAL DEVICE FUNCTION   NC        *-*-*-*-*-*-*-*-*-*-*-*-*-*-*-*-*-*-*-*-*-*-*-*-*-*-*-*-*-*-*-*-*-*-*-*-*-*-*-*-*-*-*-*-*-*-*-*-*-*-*-*-*-*-  SIGNATURES:   [unfilled]   Brian Barr MD     *-*-*-*-*-*-*-*-*-*-*-*-*-*-*-*-*-*-*-*-*-*-*-*-*-*-*-*-*-*-*-*-*-*-*-*-*-*-*-*-*-*-*-*-*-*-*-*-*-*-*-*-*-*-    PAST MEDICAL HISTORY:  Past Medical History:   Diagnosis Date    Abnormal ECG     Arrhythmia     Asthma     Atrial fibrillation (HCC)     Autonomic dysfunction     Cancer (HCC)     lung, head and neck    Coronary artery disease     Disease of thyroid gland     Dysphagia     Gastroparesis     Heart murmur     mitral and aortic regurg    Hyperlipidemia     Hypertension     Irregular heart beat     PAST SURGICAL HISTORY:   Past Surgical History:   Procedure Laterality Date    CARDIAC CATHETERIZATION      CARDIAC LOOP RECORDER      ESOPHAGOGASTRODUODENOSCOPY W/ PEG      HEAD AND NECK DEBRIDEMENT      LUNG SURGERY Left     upper lobectomy    PEG TUBE PLACEMENT  01/15/2021    By Dr Terra Echeverria PEG TUBE PLACEMENT  2017    PEG TUBE PLACEMENT  06/2020    Balloon gastrostomy-replaced 3 more times secondary to balloon failure and Balloon falling out         FAMILY HISTORY:  Family History   Problem Relation Age of Onset    Lung cancer Sister     SOCIAL HISTORY:  Social History     Tobacco Use   Smoking Status Former Smoker    Packs/day: 0 50    Years: 20 00    Pack years: 10 00   Smokeless Tobacco Never Used   Tobacco Comment    Quit at age 41      Social History     Substance and Sexual Activity   Alcohol Use Not Currently     Social History     Substance and Sexual Activity   Drug Use Never    [unfilled]     *-*-*-*-*-*-*-*-*-*-*-*-*-*-*-*-*-*-*-*-*-*-*-*-*-*-*-*-*-*-*-*-*-*-*-*-*-*-*-*-*-*-*-*-*-*-*-*-*-*-*-*-*-*  ALLERGIES:  Allergies   Allergen Reactions    Albuterol Other (See Comments)     Other reaction(s): Other (See Comments)  Afib  Causes afib      Methylprednisolone Other (See Comments)     Dizziness   Patient states adverse effect, "i feel weird in the head"    CURRENT SCHEDULED MEDICATIONS:    Current Outpatient Medications:     apixaban (ELIQUIS) 2 5 mg, Take 1 tablet (2 5 mg total) by mouth 2 (two) times a day, Disp: 180 tablet, Rfl: 3    Ascorbic Acid (vitamin C) 100 MG tablet, Take 100 mg by mouth daily, Disp: , Rfl:     atorvastatin (LIPITOR) 20 mg tablet, atorvastatin 20 mg tablet, Disp: , Rfl:     bisoprolol (ZEBETA) 5 mg tablet, Take 0 5 tablets (2 5 mg total) by mouth daily, Disp: 15 tablet, Rfl: 3    cholecalciferol (VITAMIN D3) 400 units tablet, Take 400 Units by mouth daily, Disp: , Rfl:     digoxin (LANOXIN) 0 125 mg tablet, Take 1 tablet (125 mcg total) by mouth every other day, Disp: 45 tablet, Rfl: 3    levothyroxine 112 mcg tablet, Take 112 mcg by mouth daily 100mg daily, Disp: , Rfl:     pyridoxine (VITAMIN B6) 100 mg tablet, Take 100 mg by mouth daily, Disp: , Rfl: *-*-*-*-*-*-*-*-*-*-*-*-*-*-*-*-*-*-*-*-*-*-*-*-*-*-*-*-*-*-*-*-*-*-*-*-*-*-*-*-*-*-*-*-*-*-*-*-*-*-*-*-*-*

## 2021-06-15 NOTE — PATIENT INSTRUCTIONS
Hold Bisoprolol the morning of walking Rx nuclear stress test    2gm sodium low fat low cholesterol diet, eating fresh is best

## 2021-06-15 NOTE — PROGRESS NOTES
Cardiology Follow Up    Sandi Key  1940  [id number]  [facility]  [address] [facility]  RYAN [facility]   [phone]    1  Shortness of breath  NM myocardial perfusion spect (rx stress and/or rest)   2  Coronary artery disease involving native heart without angina pectoris, unspecified vessel or lesion type     3  Paroxysmal atrial fibrillation (HCC)     4  Lightheadedness         Interval History:   Ms Heriberto Navarro presents to our office for a follow up visit  She would like to switch doctors within our group  She would like to see Dr Ania Linn and has been unable to get an appointment  Lucie Muhammad would like to stay at the [facility] office  She is aware Dr Ania Linn no longer goes to the [facility] office  Her main complaint is dizziness and feeling " off"  When Lucie Muhammad is working out side she gets  dizzy, diaphoretic, fatigued and will need to sit down to recover  She denies pre syncope or the room spinning            HPI:  Paroxysmal atrial fibrillation on Eliquis 2 5mg BID for stroke prevention   Hypertension  Mixed Hyperlipidemia  Loop recorder inplace   PVC's  CAD [facility] 2/15 60% LAD disease  Lung CA stage 1B, 1/2013 sp Left upper lobectomy  MGUS  Hx of dysphagia and gastroparesis,   3/16/21 TTE: LVEF 60%, Grade 1 DD, mild MR, mild AI, Mild TR, PAP 25mmHg  Patient Active Problem List   Diagnosis    Paroxysmal atrial fibrillation ([address] )    History of lung cancer in adulthood    Supraventricular tachycardia ([address] )    Centrilobular emphysema ([address] )    History of lobectomy of lung    History of head and neck radiation    Moderate protein-calorie malnutrition (HCC)    Orthostatic hypotension    Coronary artery disease involving native coronary artery of native heart without angina pectoris    Mitral and aortic valve regurgitation    Diastolic dysfunction without heart failure    Pericardial effusion    S/P percutaneous endoscopic gastrostomy (PEG) tube placement ([facility]    Dysphagia    Encounter for care related to feeding tube    Shortness of breath    Nonrheumatic tricuspid valve regurgitation     Past Medical History:   Diagnosis Date    Abnormal ECG     Arrhythmia     Asthma     Atrial fibrillation (HCC)     Autonomic dysfunction     Cancer (HCC)     lung, head and neck    Coronary artery disease     Disease of thyroid gland     Dysphagia     Gastroparesis     Heart murmur     mitral and aortic regurg    Hyperlipidemia     Hypertension     Irregular heart beat      Social History     Socioeconomic History    Marital status: Unknown     Spouse name: Not on file    Number of children: Not on file    Years of education: Not on file    Highest education level: Not on file   Occupational History    Occupation: retired - /   Tobacco Use    Smoking status: Former Smoker     Packs/day: 0 50     Years: 20 00     Pack years: 10 00    Smokeless tobacco: Never Used    Tobacco comment: Quit at age 41   Vaping Use    Vaping Use: Never used   Substance and Sexual Activity    Alcohol use: Not Currently    Drug use: Never    Sexual activity: Not on file   Other Topics Concern    Not on file   Social History Narrative    Not on file     Social Determinants of Health     Financial Resource Strain:     Difficulty of Paying Living Expenses:    Food Insecurity:     Worried About Running Out of Food in the Last Year:     Ran Out of Food in the Last Year:    Transportation Needs:     Lack of Transportation (Medical):      Lack of Transportation (Non-Medical):    Physical Activity:     Days of Exercise per Week:     Minutes of Exercise per Session:    Stress:     Feeling of Stress :    Social Connections:     Frequency of Communication with Friends and Family:     Frequency of Social Gatherings with Friends and Family:     Attends Orthodox Services:     Active Member of Clubs or Organizations:     Attends Club or Organization Meetings:     Marital Status:    Intimate Partner Violence:     Fear of Current or Ex-Partner:     Emotionally Abused:     Physically Abused:     Sexually Abused:       Family History   Problem Relation Age of Onset    Lung cancer Sister      Past Surgical History:   Procedure Laterality Date    CARDIAC CATHETERIZATION      CARDIAC LOOP RECORDER      ESOPHAGOGASTRODUODENOSCOPY W/ PEG      HEAD AND NECK DEBRIDEMENT      LUNG SURGERY Left     upper lobectomy    PEG TUBE PLACEMENT  01/15/2021    By Dr Margie Gloria  2017    PEG TUBE PLACEMENT  06/2020    Balloon gastrostomy-replaced 3 more times secondary to balloon failure and Balloon falling out       Current Outpatient Medications:     apixaban (ELIQUIS) 2 5 mg, Take 1 tablet (2 5 mg total) by mouth 2 (two) times a day, Disp: 180 tablet, Rfl: 3    Ascorbic Acid (vitamin C) 100 MG tablet, Take 3,000 mg by mouth daily , Disp: , Rfl:     atorvastatin (LIPITOR) 20 mg tablet, atorvastatin 20 mg tablet, Disp: , Rfl:     bisoprolol (ZEBETA) 5 mg tablet, Take 0 5 tablets (2 5 mg total) by mouth daily, Disp: 15 tablet, Rfl: 3    digoxin (LANOXIN) 0 125 mg tablet, Take 1 tablet (125 mcg total) by mouth every other day, Disp: 45 tablet, Rfl: 3    levothyroxine 112 mcg tablet, Take 112 mcg by mouth daily 100mg daily, Disp: , Rfl:     cholecalciferol (VITAMIN D3) 400 units tablet, Take 400 Units by mouth daily, Disp: , Rfl:     pyridoxine (VITAMIN B6) 100 mg tablet, Take 100 mg by mouth daily, Disp: , Rfl:   Allergies   Allergen Reactions    Albuterol Other (See Comments)     Other reaction(s): Other (See Comments)  Afib  Causes afib      Methylprednisolone Other (See Comments)     Dizziness  Patient states adverse effect, "i feel weird in the head"       Labs:  No visits with results within 2 Month(s) from this visit     Latest known visit with results is: Admission on 03/15/2021, Discharged on 03/16/2021   Component Date Value    WBC 03/15/2021 12 07*    RBC 03/15/2021 4 00     Hemoglobin 03/15/2021 13 3     Hematocrit 03/15/2021 41 0     MCV 03/15/2021 103*    MCH 03/15/2021 33 3     MCHC 03/15/2021 32 4     RDW 03/15/2021 12 0     MPV 03/15/2021 9 7     Platelets 81/19/3180 328     nRBC 03/15/2021 0     Neutrophils Relative 03/15/2021 80*    Immat GRANS % 03/15/2021 1     Lymphocytes Relative 03/15/2021 7*    Monocytes Relative 03/15/2021 12     Eosinophils Relative 03/15/2021 0     Basophils Relative 03/15/2021 0     Neutrophils Absolute 03/15/2021 9 57*    Immature Grans Absolute 03/15/2021 0 17     Lymphocytes Absolute 03/15/2021 0 83     Monocytes Absolute 03/15/2021 1 47*    Eosinophils Absolute 03/15/2021 0 02     Basophils Absolute 03/15/2021 0 01     Sodium 03/15/2021 140     Potassium 03/15/2021 4 0     Chloride 03/15/2021 101     CO2 03/15/2021 30     ANION GAP 03/15/2021 9     BUN 03/15/2021 49*    Creatinine 03/15/2021 1 18     Glucose 03/15/2021 89     Calcium 03/15/2021 9 7     eGFR 03/15/2021 43     Total Bilirubin 03/15/2021 0 39     Bilirubin, Direct 03/15/2021 0 11     Alkaline Phosphatase 03/15/2021 102     AST 03/15/2021 25     ALT 03/15/2021 45     Total Protein 03/15/2021 7 1     Albumin 03/15/2021 3 7     TSH 3RD GENERATON 03/15/2021 1 477     Troponin I 03/15/2021 <0 02     NT-proBNP 03/15/2021 998*    Magnesium 03/15/2021 2 3     Ventricular Rate 03/15/2021 78     Atrial Rate 03/15/2021 78     NM Interval 03/15/2021 124     QRSD Interval 03/15/2021 68     QT Interval 03/15/2021 360     QTC Interval 03/15/2021 410     P Axis 03/15/2021 77     QRS Axis 03/15/2021 49     T Wave Axis 03/15/2021 62     Sodium 03/16/2021 140     Potassium 03/16/2021 4 3     Chloride 03/16/2021 103     CO2 03/16/2021 26     ANION GAP 03/16/2021 11     BUN 03/16/2021 44*    Creatinine 03/16/2021 0 85  Glucose 03/16/2021 99     Calcium 03/16/2021 9 4     eGFR 03/16/2021 64     WBC 03/16/2021 11 17*    RBC 03/16/2021 3 96     Hemoglobin 03/16/2021 13 1     Hematocrit 03/16/2021 39 9     MCV 03/16/2021 101*    MCH 03/16/2021 33 1     MCHC 03/16/2021 32 8     RDW 03/16/2021 11 9     Platelets 73/49/3816 312     MPV 03/16/2021 10 4     Digoxin Lvl 03/16/2021 1 0      Imaging: No results found  Review of Systems:  Review of Systems   HENT: Positive for hearing loss  Respiratory: Positive for shortness of breath  Musculoskeletal: Positive for arthralgias and myalgias  Neurological: Positive for weakness and light-headedness  All other systems reviewed and are negative  Physical Exam:  Physical Exam  Vitals reviewed  Constitutional:       Appearance: Normal appearance  HENT:      Head: Normocephalic  Eyes:      Pupils: Pupils are equal, round, and reactive to light  Cardiovascular:      Rate and Rhythm: Normal rate and regular rhythm  Pulmonary:      Effort: Pulmonary effort is normal       Breath sounds: Normal breath sounds  Abdominal:      General: Bowel sounds are normal       Palpations: Abdomen is soft  Musculoskeletal:         General: Normal range of motion  Cervical back: Normal range of motion  Right lower leg: No edema  Left lower leg: No edema  Skin:     General: Skin is warm and dry  Capillary Refill: Capillary refill takes less than 2 seconds  Neurological:      General: No focal deficit present  Mental Status: She is alert and oriented to person, place, and time  Psychiatric:         Mood and Affect: Mood normal          Behavior: Behavior normal      orthostatic BP negative in office today     Discussion/Summary:  1  Hx of CAD [facility] 2/2015  60% LAD disease- symptoms of shortness of breath, diaphoresis and fatigue with exertion- Rx walking nuclear stress test R/O ischemia  2   Paroxysmal atrial fibrillation found on device interrogation, [id number]= 4 on Eliquis 2 5mg BID for stroke prevention, continue on Zebeta 2 5mg daily, Digoxin 125 mcg every other day  3  Hypertension- controlled on Zebeta 2 5mg daily   4  Lightheadedness- most likely due to Vertigo, PCP has referred her to vestibular therapy

## 2021-06-24 NOTE — TELEPHONE ENCOUNTER
----- Message from Mariela Unger, [address] sent at 6/24/2021  4:04 PM EDT -----  Please call Ms Ge Shah and inform her the stress test was normal   Thank you

## 2021-07-01 NOTE — PROGRESS NOTES
Virtual Brief Visit    Assessment/Plan:  1  Hx of CAD [facility] 2/2015 60% LAD disease, Continue on Eliquis 2 5 mg daily, Lipitor 20 mg daily, Zebeta 2 5 mg daily, digoxin 125 mcg every other,  Heart healthy  2  Paroxysmal atrial fibrillation [id number]- On Eliquis 2 5mg BID for stroke prevention, continue on Zebeta 2 5mg daily and digoxin 125mcg every other day   3  Hypertension continue on Zebeta 2 5mg daily    4  Lightheadedness most likely due to vertigo, Referral to vestibular therapy done by PCP   Problem List Items Addressed This Visit     None                Reason for visit is   Chief Complaint   Patient presents with    Virtual Brief Visit        Encounter provider CARMELA Tomas    Provider located at [place] [address]  [address] [place] [id number]    Recent Visits  Date Type Provider Dept   06/24/21 Telephone [address] [facility] recent visits within past 7 days and meeting all other requirements  Today's Visits  Date Type Provider Dept   07/01/21 Office Visit Salomon Francis, [address] today's visits and meeting all other requirements  Future Appointments  No visits were found meeting these conditions  Showing future appointments within next 150 days and meeting all other requirements       After connecting through telephone, the patient was identified by name and date of birth  Igor Scott was informed that this is a telemedicine visit and that the visit is being conducted through telephone  My office door was closed  No one else was in the room  She acknowledged consent and understanding of privacy and security of the platform  The patient has agreed to participate and understands she can discontinue the visit at any time  Patient is aware this is a billable service       Elda Lam is a 78 y o  female who was seen in our office on 6/15/21 with complaints of dizziness and feeling off  According to [facility] when she works outside she gets dizzy, diaphoretic, fatigued with the need to sit down to recover  An Rx nuclear stress test was ordered to R/O CAD contributing to symptoms  Today's visit is conducted by telephone  [facility] injured her back moving furniture in her basement  She is in a lot of pain and is having difficulty moving  I have reviewed the results of the stress test which was negative for perfusion abnormality  [facility] had not been active therefore has not experienced further symptoms  [facility] adds her  is on hospice and not doing well         HPI   Paroxysmal atrial fibrillation on Eliquis 2 5mg BID for stroke prevention   Hypertension  Mixed Hyperlipidemia  Loop recorder inplace   PVC's  CAD [facility] 2/15 60% LAD disease  Lung CA stage 1B, 1/2013 sp Left upper lobectomy  MGUS  Hx of dysphagia and gastroparesis,   3/16/21 TTE: LVEF 60%, Grade 1 DD, mild MR, mild AI, Mild TR, PAP 25mmHg  Past Medical History:   Diagnosis Date    Abnormal ECG     Arrhythmia     Asthma     Atrial fibrillation (HCC)     Autonomic dysfunction     Cancer ([address] )     lung, head and neck    Coronary artery disease     Disease of thyroid gland     Dysphagia     Gastroparesis     Heart murmur     mitral and aortic regurg    Hyperlipidemia     Hypertension     Irregular heart beat        Past Surgical History:   Procedure Laterality Date    CARDIAC CATHETERIZATION      CARDIAC LOOP RECORDER      ESOPHAGOGASTRODUODENOSCOPY W/ PEG      HEAD AND NECK DEBRIDEMENT      LUNG SURGERY Left     upper lobectomy    PEG TUBE PLACEMENT  01/15/2021    By Dr Peyman Quiros  2017    PEG TUBE PLACEMENT  06/2020    Balloon gastrostomy-replaced 3 more times secondary to balloon failure and Balloon falling out       Current Outpatient Medications   Medication Sig Dispense Refill    apixaban (ELIQUIS) 2 5 mg Take 1 tablet (2 5 mg total) by mouth 2 (two) times a day 180 tablet 3    Ascorbic Acid (vitamin C) 100 MG tablet Take 3,000 mg by mouth daily       atorvastatin (LIPITOR) 20 mg tablet atorvastatin 20 mg tablet      bisoprolol (ZEBETA) 5 mg tablet Take 0 5 tablets (2 5 mg total) by mouth daily 15 tablet 3    cholecalciferol (VITAMIN D3) 400 units tablet Take 400 Units by mouth daily      digoxin (LANOXIN) 0 125 mg tablet Take 1 tablet (125 mcg total) by mouth every other day 45 tablet 3    levothyroxine 112 mcg tablet Take 112 mcg by mouth daily 100mg daily      pyridoxine (VITAMIN B6) 100 mg tablet Take 100 mg by mouth daily       No current facility-administered medications for this visit  Allergies   Allergen Reactions    Albuterol Other (See Comments)     Other reaction(s): Other (See Comments)  Afib  Causes afib      Methylprednisolone Other (See Comments)     Dizziness  Patient states adverse effect, "i feel weird in the head"       Review of Systems   Musculoskeletal: Positive for arthralgias, back pain and myalgias  All other systems reviewed and are negative  There were no vitals filed for this visit  I spent 15 minutes directly with the patient during this visit    Jb Maldonado acknowledges that she has consented to an online visit or consultation  She understands that the online visit is based solely on information provided by her, and that, in the absence of a face-to-face physical evaluation by the physician, the diagnosis she receives is both limited and provisional in terms of accuracy and completeness  This is not intended to replace a full medical face-to-face evaluation by the physician  Daniela العراقي understands and accepts these terms

## 2021-07-01 NOTE — PATIENT INSTRUCTIONS
2gm sodium low fat low cholesterol diet, eating fresh is best, fresh fruit, fresh vegetables, lean protein

## 2021-07-08 NOTE — PROGRESS NOTES
CARELINK TRANSMISSION: LOOP RECORDER  PRESENTING RHYTHM NSR @ 72 BPM  BATTERY STATUS "OK " 11 TACHY EPISODES W/ EGRAMS SHOWING T WAVE OVERSENSING  1 AF EPISODE EGRAM CANNOT R/O AF  AF BURDEN = 0 1%  HX OF PAF  PT TAKES BISOPROLOL AND ELIQUIS  NO PATIENT ACTIVATED EPISODES  NORMAL DEVICE FUNCTION   DL

## 2021-07-13 NOTE — TELEPHONE ENCOUNTER
Advised  Verbally understood  She said she found her midodrine from before, 2 5 mg and took that  Her BP is fine now

## 2021-07-13 NOTE — TELEPHONE ENCOUNTER
P/c'd , states she fx her back several weeks ago  Now she is having issues with hypotension  SBP 68-75 at times and Hr 80's  It drops lower when she stands and she gets dizzy  She is only taking tylenol for the pain  Unable to come in for appt or go to ER because of fracture  She has held Bisoprolol for the last 3 days because of the low pressure  She is forcing fluids      Has appt on 9/24/21 with DR Carly Cervantes    Taking:digoxin 125 mcg QOD                  Please advise

## 2021-07-13 NOTE — TELEPHONE ENCOUNTER
Other than increasing hydration and salt intake there isn't much more I can do over the phone  Not on any other meds that would lower BP    If worsens needs ER

## 2021-07-13 NOTE — TELEPHONE ENCOUNTER
Pt no longer wanted to follow with Dr Yadira Su  Seen by Val Degroot x 2 visits  Please forward to Val Degroot or who is covering for her  Thank you

## 2021-07-22 NOTE — TELEPHONE ENCOUNTER
Received message from patient on triage line is having difficulty with BP and medications  Pt is seen by Jordin Carver and has appt with Dr Ilda Cooks  Please call patient

## 2021-07-22 NOTE — TELEPHONE ENCOUNTER
Julien Simon has a pacemaker  Her  recently passed away,  this Saturday  Her b/p as been running very low: 69/44, 51/42  She stopped taking Bisoprolol  She still takes Eliquis 2 5 bid, Digoxin 0 125 EVERY other day, and Atorvastatin 20 mg  She has been very dizzy and weak  She said she has an old prescription of Midodrine 2 5, which she took 1 tablet today  Her b/p was better at 93/58  She said she doesn't know what to do  Her speech is almost slurred, but I don't know how she normally sounds  Please advise  If possible, call the patient directly    [phone]

## 2021-07-22 NOTE — TELEPHONE ENCOUNTER
Ok to continue off Daryl Nagy  Ensure drinking and eating  Continue to monitor BP  Hold off on Midodrine for now

## 2021-07-26 NOTE — TELEPHONE ENCOUNTER
PC from patient who is calling [facility] office, her PCP and Dr Reddy Espitia for advisement on her hypotension  Her BP has been dropping, lately  BP's for last two weeks are averaging from 52/48 to 82/48  She is very symptomatic with lightheadedness, dizziness and feeling rung out  She has  a prescription for Midodrine but it was stopped some time ago,  due to good BP's  She took one Midodrine recently, and felt better because her BP came up some  However, Teresa Angel told her not to take on regular basis but Livia Peter feels she needs them again  Her PCP gave her a prescription for Midodrine but she was told to contact Cardiology by PCP office since Dr Susan Barker is out on medical leave  It was also only given as a refill, patient states, and that the original one was given by Dr Reddy Espitia  Can she restart this Midodrine until she gets into see Dr Kain Mosher, who she wants to transfer to  Please advise    [phone]

## 2021-07-30 NOTE — PATIENT INSTRUCTIONS
CARDIOLOGY ASSESSMENT & PLAN     1  PAF (paroxysmal atrial fibrillation) (HCC)  POCT ECG   2  Dizziness  POCT ECG   3  Orthostatic hypotension  midodrine (PROAMATINE) 2 5 mg tablet   4  Paroxysmal atrial fibrillation (HCC)     5  Nonrheumatic tricuspid valve regurgitation     6  Diastolic dysfunction without heart failure     7  Supraventricular tachycardia (HCC)       Orthostatic hypotension  Ms Rand De La Torre seems to having orthostatic hypotension and symptomatic low blood pressures  She has discontinued her beta-blocker therapy recently  Device interrogation showed transient atrial tachycardia episodes  -- I am advising her to stay off bisoprolol for now  -- I am prescribing her midodrine  Advised her to take 2 5 mg to 3 times daily during the daytime when she is up and about  -- advising her to stay well hydrated at all times and avoid sudden rising from sitting or lying down position on and sudden turning from any position and sudden bending down and getting up  -- if symptoms persist he may benefit from physical therapy to train her to eat out to avoid symptom  -- she will continue to be followed in the device Clinic  If she has recurrent SVT episodes than will consider antiarrhythmic therapy  --advising her to improve her diet an to take multivitamins regularly  -- if symptoms of neuropathy persist may need formal evaluation

## 2021-07-30 NOTE — PROGRESS NOTES
[facility]  [address] [facility], [place] [address]  Phone#  [phone]  Fax#  [phone]  *-*-*-*-*-*-*-*-*-*-*-*-*-*-*-*-*-*-*-*-*-*-*-*-*-*-*-*-*-*-*-*-*-*-*-*-*-*-*-*-*-*-*-*-*-*-*-*-*-*-*-*-*-*  ENCOUNTER DATE: 07/30/21 2:34 PM  PATIENT NAME: Ross Mooney   1940    [id number]  AGE:81 y o  SEX: female  Duglas Nixon MD     PRIMARY CARE PHYSICIAN: Bret Reeves DO    DIAGNOSES:   1  Coronary artery disease with nonobstructive CAD identified in 2015, 60% disease in LAD   2  Paroxysmal atrial fibrillation diagnosed in the remote past and noted to have recurrent bouts in the last few years  3  Essential hypertension   4  Orthostatic hypotension   5  Premature ventricular contractions   6  History of lung carcinoma stage IB (January 2013) status post left upper lobectomy  7  Head and neck cancer April 2015, status s/p radiation therapy   7  Monoclonal gammopathy of unknown significance (MGUS)   9  History of dysphagia and gastroparesis, status post SPECT to placement in 2018   10  Status post loop recorder implantation May 2019   11  Hypothyroidism   12  Mixed dyslipidemia   13  Autonomic dysfunction   14  Compression fracture of T9 vertebra, July 2021  15  Thoracic myofascial pain    LEXISCAN NUCLEAR STRESS TEST June 2021:       SUMMARY:   -  Stress results: There was no chest pain during stress  -  ECG conclusions: The stress ECG was negative for ischemia and normal  No new abnormal ST T wave changes were noted with regadenoson injection  There was persistence of diffuse downsloping ST depressions in inferior and anterolateral   leads  -  Perfusion imaging: There were no perfusion defects    -  Gated SPECT: The calculated left ventricular ejection fraction was 73 %  Left ventricular ejection fraction was within normal limits by visual estimate   There was no left ventricular regional abnormality        IMPRESSIONS: 1  Negative regadenoson nuclear stress test for symptoms of angina pectoris and negative for ECG changes of ischemia  2  Normal myocardial perfusion scan with no definite evidence of reversible or fixed perfusion abnormality  3  Normal left ventricular size and systolic function and regional wall motion  Determined ejection fraction was 73%  4  Markedly elevated blood pressure at beginning of the test which improved to mildly increased towards the end of the test    5  Nonspecific resting ECG abnormalities with no significant changes and no significant arrhythmia noted during stress test  Myocardial perfusion imaging was normal at rest and with stress        CARDIAC MR April 13, 2021:   Left ventricle: Left ventricle demonstrates normal size and systolic function,   ejection fraction is 69%  Ventricular septum measures 6 mm thickness, posterior   inferior wall measures 6 mm  There is slight heterogeneous abnormal enhancement   mid inferior which likely represents artifact  There are no findings suggesting   amyloidosis or scar      Right ventricle: Normal, with ejection fraction 53%      Left atrium: Normal    Right atrium: Mildly enlarged      Aorta and great vessels: Normal    Pulmonary artery: Normal    Pericardium: Normal    Veins: Normal      Valves: There is moderate tricuspid regurgitation with eccentric jet  There is   mild aortic insufficiency          EchocardiogramAugust 21, 2020, [address]:   ·  Left Ventricle: There is mild concentric hypertrophy  ·  Left Ventricle: Systolic function is normal with an ejection fraction   of 55-60%  ·  Left Ventricle: Wall motion cannot be accurately assessed  ·  Left Ventricle: There is grade I (mild) diastolic dysfunction and   normal left atrial pressure  ·  Aortic Valve: The aortic valve is trileaflet  There is mild sclerosis  ·  Aortic Valve: There is mild regurgitation  ·  Mitral Valve: There is mild regurgitation     ·  Pericardium: There is a small pericardial effusion  ·  Pericardium: There is no echocardiographic evidence of tamponade       CURRENT ECG   No results found for this visit on 07/30/21  Loop recorder remote transmission July 8, 2021:  CARELINK TRANSMISSION: LOOP RECORDER  PRESENTING RHYTHM NSR @ 72 BPM  BATTERY STATUS "OK " 11 TACHY EPISODES W/ EGRAMS SHOWING T WAVE OVERSENSING  1 AF EPISODE EGRAM CANNOT R/O AF  AF BURDEN = 0 1%  HX OF PAF  PT TAKES BISOPROLOL AND ELIQUIS  NO PATIENT ACTIVATED EPISODES  NORMAL DEVICE FUNCTION  DL           CARDIOLOGY ASSESSMENT & PLAN     1  PAF (paroxysmal atrial fibrillation) (HCC)  POCT ECG   2  Dizziness  POCT ECG   3  Orthostatic hypotension  midodrine (PROAMATINE) 2 5 mg tablet   4  Paroxysmal atrial fibrillation (HCC)     5  Nonrheumatic tricuspid valve regurgitation     6  Diastolic dysfunction without heart failure     7  Supraventricular tachycardia (HCC)       Orthostatic hypotension  Ms Dana Beard seems to having orthostatic hypotension and symptomatic low blood pressures  She has discontinued her beta-blocker therapy recently  Device interrogation showed transient atrial tachycardia episodes  -- I am advising her to stay off bisoprolol for now  -- I am prescribing her midodrine  Advised her to take 2 5 mg to 3 times daily during the daytime when she is up and about  -- advising her to stay well hydrated at all times and avoid sudden rising from sitting or lying down position on and sudden turning from any position and sudden bending down and getting up  -- if symptoms persist he may benefit from physical therapy to train her to eat out to avoid symptom  -- she will continue to be followed in the device Clinic  If she has recurrent SVT episodes than will consider antiarrhythmic therapy  --advising her to improve her diet an to take multivitamins regularly  -- if symptoms of neuropathy persist may need formal evaluation           INTERVAL HISTORY & HISTORY OF PRESENT ILLNESS Gene Miguel is here for follow-up regarding her cardiac comorbidities which include: paraxysmal atrial fibrillation, essential hypertension, dizziness, history of loop recorder implantation and other comorbidities  She has been overall all right from cardiac perspective but has been going through significant stress  Her  passed away recently  In addition she had suffered a compression fracture to her mid back in July and is in severe pain  She lacks good social network and is finding difficult to cope  Denies severe depression symptoms or suicidal ideation  Has nutritional deficiencies due to difficulty in swallowing  Does use feeding through PEG tube  Denies any palpitations or presyncope/syncope  Recently she had been dealing with intermittent dizziness and diaphoresis while being outside  Episodes would happen like cuspal when she suddenly feels lightheaded and dizzy and flushed  She has to sit down immediately and raise her feet and she feels better  She was evaluated by our colleague at [facility] and underwent a nuclear stress test   It was Normal   Symptom of dizziness is now resolved  Denies any typical angina-like chest pain, unusual shortness of breath, orthopnea, PND or worsening pedal edema  Bisoprolol was stopped  Functional capacity status: Moderate   (Excellent- >10 METs; Good: (7-10 METs); Moderate (4-7 METs); Poor (<= 4 METs)    Any chronic stressors:  Social isolation, normal agreement   (feeling of poor health, financial problems, and social isolation etc)  Tobacco or alcohol dependence:  None    Current cardiac medications: Digoxin 125 mcg EOD, atorvastatin 20 daily, Eliquis 2 5 BID    REVIEW OF SYSTEMS   Positive for:  As noted above  Negative for: All remaining as reviewed below and in HPI      SYSTEM SYMPTOMS REVIEWED:  General--weight change, fever, night sweats  Respiratory--cough, wheezing, shortness of breath, sputum production  Cardiovascular--chest pain, syncope, dyspnea on exertion, edema, decline in exercise tolerance, claudication   Gastrointestinal--persistent vomiting, diarrhea, abdominal distention, blood in stool   Muscular or skeletal--joint pain or swelling   Neurologic--headaches, syncope, abnormal movement  Hematologic--history of easy bruising and bleeding   Endocrine--thyroid enlargement, heat or cold intolerance, polyuria   Psychiatric--anxiety, depression     *-*-*-*-*-*-*-*-*-*-*-*-*-*-*-*-*-*-*-*-*-*-*-*-*-*-*-*-*-*-*-*-*-*-*-*-*-*-*-*-*-*-*-*-*-*-*-*-*-*-*-*-*-*-  VITAL SIGNS     CURRENT VITAL SIGNS:   Vitals:    07/30/21 1345   BP: 122/84   BP Location: Right arm   Patient Position: Sitting   Cuff Size: Adult   Pulse: 82   Weight: 54 4 kg (120 lb)   Height: 5' 5" (1 651 m)       BMI: Body mass index is 19 97 kg/m²  WEIGHTS:   Wt Readings from Last 25 Encounters:   07/30/21 54 4 kg (120 lb)   07/01/21 54 4 kg (120 lb)   06/15/21 55 6 kg (122 lb 8 oz)   04/26/21 54 4 kg (120 lb)   04/16/21 55 3 kg (122 lb)   03/16/21 53 6 kg (118 lb 2 7 oz)   03/04/21 54 4 kg (120 lb)   02/05/21 54 kg (119 lb)   01/15/21 54 kg (119 lb)   12/23/20 54 kg (119 lb)   10/19/20 54 9 kg (121 lb)   09/17/20 54 kg (119 lb)   04/08/16 45 4 kg (100 lb)        *-*-*-*-*-*-*-*-*-*-*-*-*-*-*-*-*-*-*-*-*-*-*-*-*-*-*-*-*-*-*-*-*-*-*-*-*-*-*-*-*-*-*-*-*-*-*-*-*-*-*-*-*-*-  PHYSICAL EXAM     General Appearance:    Alert, cooperative, no distress, appears stated age,   Head, Eyes, ENT:    signs of protein calorie malnutrition, moist mucous mebranes  Neck:   Supple, no carotid bruit or JVD   Back:     Symmetric, no curvature  Lungs:     Respirations unlabored  Clear to auscultation bilaterally,    Chest wall:    No tenderness or deformity   Heart:    Regular rate and rhythm, S1 and S2 normal, no murmur, rub  or gallop     Abdomen:     Soft, non-tender, No obvious masses, or organomegaly   Extremities:   Extremities warm, no cyanosis or edema    Skin:   No venostatic changes in lower extremities  Decreased skin turgor and loss of subcutaneous fat  There is bruising relating to use of Eliquis  *-*-*-*-*-*-*-*-*-*-*-*-*-*-*-*-*-*-*-*-*-*-*-*-*-*-*-*-*-*-*-*-*-*-*-*-*-*-*-*-*-*-*-*-*-*-*-*-*-*-*-*-*-*-  CURRENT MEDICATIONS LIST     Current Outpatient Medications:     apixaban (ELIQUIS) 2 5 mg, Take 1 tablet (2 5 mg total) by mouth 2 (two) times a day, Disp: 180 tablet, Rfl: 0    Ascorbic Acid (vitamin C) 100 MG tablet, Take 3,000 mg by mouth daily , Disp: , Rfl:     atorvastatin (LIPITOR) 20 mg tablet, atorvastatin 20 mg tablet, Disp: , Rfl:     cholecalciferol (VITAMIN D3) 400 units tablet, Take 400 Units by mouth daily , Disp: , Rfl:     digoxin (LANOXIN) 0 125 mg tablet, Take 1 tablet (125 mcg total) by mouth every other day, Disp: 45 tablet, Rfl: 3    levothyroxine 112 mcg tablet, Take 112 mcg by mouth daily 100mg daily, Disp: , Rfl:     midodrine (PROAMATINE) 2 5 mg tablet, Take 1 tablet (2 5 mg total) by mouth 3 (three) times a day 3 times daily during daytime at least 6 hours apart  Do not take if blood pressure greater is greater than 130/80 , Disp: 90 tablet, Rfl: 1    pyridoxine (VITAMIN B6) 100 mg tablet, Take 100 mg by mouth daily (Patient not taking: Reported on 7/1/2021), Disp: , Rfl:        ALLERGIES     Allergies   Allergen Reactions    Albuterol Other (See Comments)     Other reaction(s): Other (See Comments)  Afib  Causes afib      Methylprednisolone Other (See Comments)     Dizziness   Patient states adverse effect, "i feel weird in the head"       *-*-*-*-*-*-*-*-*-*-*-*-*-*-*-*-*-*-*-*-*-*-*-*-*-*-*-*-*-*-*-*-*-*-*-*-*-*-*-*-*-*-*-*-*-*-*-*-*-*-*-*-*-*-  LABORATORY DATA   No results found for: NA  Potassium   Date Value Ref Range Status   03/16/2021 4 3 3 5 - 5 3 mmol/L Final   03/15/2021 4 0 3 5 - 5 3 mmol/L Final     Chloride   Date Value Ref Range Status   03/16/2021 103 100 - 108 mmol/L Final   03/15/2021 101 100 - 108 mmol/L Final     CO2   Date Value Ref Range Status   03/16/2021 26 21 - 32 mmol/L Final   03/15/2021 30 21 - 32 mmol/L Final     BUN   Date Value Ref Range Status   03/16/2021 44 (H) 5 - 25 mg/dL Final   03/15/2021 49 (H) 5 - 25 mg/dL Final     Creatinine   Date Value Ref Range Status   03/16/2021 0 85 0 60 - 1 30 mg/dL Final     Comment:     Standardized to IDMS reference method   03/15/2021 1 18 0 60 - 1 30 mg/dL Final     Comment:     Standardized to IDMS reference method     eGFR   Date Value Ref Range Status   03/16/2021 64 ml/min/1 73sq m Final   03/15/2021 43 ml/min/1 73sq m Final     Calcium   Date Value Ref Range Status   03/16/2021 9 4 8 3 - 10 1 mg/dL Final   03/15/2021 9 7 8 3 - 10 1 mg/dL Final     AST   Date Value Ref Range Status   03/15/2021 25 5 - 45 U/L Final     Comment:     Specimen collection should occur prior to Sulfasalazine administration due to the potential for falsely depressed results  ALT   Date Value Ref Range Status   03/15/2021 45 12 - 78 U/L Final     Comment:     Specimen collection should occur prior to Sulfasalazine administration due to the potential for falsely depressed results        Alkaline Phosphatase   Date Value Ref Range Status   03/15/2021 102 46 - 116 U/L Final     Magnesium   Date Value Ref Range Status   03/15/2021 2 3 1 6 - 2 6 mg/dL Final     WBC   Date Value Ref Range Status   03/16/2021 11 17 (H) 4 31 - 10 16 Thousand/uL Final   03/15/2021 12 07 (H) 4 31 - 10 16 Thousand/uL Final     Hemoglobin   Date Value Ref Range Status   03/16/2021 13 1 11 5 - 15 4 g/dL Final   03/15/2021 13 3 11 5 - 15 4 g/dL Final     Platelets   Date Value Ref Range Status   03/16/2021 312 149 - 390 Thousands/uL Final   03/15/2021 328 149 - 390 Thousands/uL Final     No results found for: PT, PTT, INR  Digoxin Lvl   Date Value Ref Range Status   03/16/2021 1 0 0 8 - 2 0 ng/mL Final     No results found for: TSH  No results found for: CHOL   No results found for: HGBA1C  No results found for: Luwanna Ivory, GRAMSTAIN, URINECX, WOUNDCULT, BODYFLUIDCUL, MRSACULTURE, INFLUAPCR, INFLUBPCR, RSVPCR, LEGIONELLAUR, CDIFFTOXINB    *-*-*-*-*-*-*-*-*-*-*-*-*-*-*-*-*-*-*-*-*-*-*-*-*-*-*-*-*-*-*-*-*-*-*-*-*-*-*-*-*-*-*-*-*-*-*-*-*-*-*-*-*-*-  PREVIOUS CARDIOLOGY & RADIOLOGY TEST RESULTS   I have personally reviewed pertinent results of cardiovascular tests noted below  Results for orders placed during the hospital encounter of 03/15/21    Echo complete with contrast if indicated    Narrative  [address] [place] [address], [address] [facility]  [phone]    Transthoracic Echocardiogram    Study date:  16-Mar-2021    Patient: Faviola Ruiz  MR number: [id number]  Account number: [de-identified]  : 1940  Age: 78 years  Gender: Female  Status: Outpatient  Location: Bedside  Height: 65 in  Weight: 117 7 lb  BP: 136/ 71 mmHg    Indications: abnormal heart sound  Diagnoses: R01 1 - Cardiac murmur, unspecified    Sonographer:  [facility]  Primary Physician:  Bouchra Fajardo DO  Referring Physician:  Hesham Molina Do  Group:  [facility]  Interpreting Physician:  ACE Myles     SUMMARY    LEFT VENTRICLE:  Systolic function was normal by visual assessment  Ejection fraction was estimated to be 60 %  There were no regional wall motion abnormalities  Doppler parameters were consistent with abnormal left ventricular relaxation (grade 1 diastolic dysfunction)  MITRAL VALVE:  There was mild regurgitation  AORTIC VALVE:  There was mild regurgitation  AI  ms  TRICUSPID VALVE:  There was mild regurgitation  Estimated peak PA pressure was 25 mmHg  PULMONIC VALVE:  There was trace regurgitation  IVC, HEPATIC VEINS:  Respirophasic changes were normal  Estimated right atrial pressure of 3 mmHg, Estimated PASP 25 mmHg, Estimated PCWP of 11 4 mmHg (by E/e')      PERICARDIUM:  A small pericardial effusion was identified circumferential to the heart  There was no evidence of hemodynamic compromise  SUMMARY MEASUREMENTS  CW measurements:  Unspecified Anatomy:   AR Dec [place] was 1 9 m/s2  AR Dec Time was 1931 8 ms  AR PHT was 560 2 ms  AR Vmax was 3 7 m/s  AR maxPG was 54 mmHg  MV VTI was 18 3 cm  MV Vmax was 1 1 m/s  MV Vmean was 0 5 m/s  MV maxPG was 4 8 mmHg  MV  meanPG was 1 2 mmHg  PRend PG was 5 mmHg  PRend Vmax was 1 1 m/s  TR Vmax was 2 3 m/s  TR maxPG was 21 9 mmHg  MM measurements:  Unspecified Anatomy:   TAPSE was 3 4 cm  PW measurements:  Unspecified Anatomy:   E' Avg was 0 1 m/s  E' Lat was 0 1 m/s  E' Sept was 0 1 m/s  E/E' Avg was 7 3   E/E' Lat was 8 3   E/E' Sept was 6 5   LVOT Env  Ti was 284 5 ms  LVOT VTI was 16 2 cm  LVOT Vmax was 0 8 m/s  LVOT Vmean was  0 6 m/s  LVOT maxPG was 2 9 mmHg  LVOT meanPG was 1 5 mmHg  LVSI Dopp was 47 6 ml/m2  LVSV Dopp was 75 2 ml   MV A [place] was 0 8 m/s  MV Dec [place] was 1 7 m/s2  MV DecT was 295 9 ms   MV E Yaya was 0 4 m/s  MV E/A Ratio was 0 5   MVA  (VTI) was 4 1 cm2   TV E' lat was 0 1 m/s  HISTORY: PRIOR HISTORY: shortness of breath, coronary artery disease, arrhythmia, atrial fibrillation, cardiac murmur, hyperlipidemia, hypertension  PROCEDURE: The procedure was performed at the bedside  This was a stat study  The transthoracic approach was used  The heart rate was 79 bpm, at the start of the study  Images were obtained from the parasternal, apical, subcostal, and  suprasternal notch acoustic windows  Image quality was adequate  LEFT VENTRICLE: Size was normal  Systolic function was normal by visual assessment  Ejection fraction was estimated to be 60 %  There were no regional wall motion abnormalities  Wall thickness was normal  DOPPLER: Doppler parameters were  consistent with abnormal left ventricular relaxation (grade 1 diastolic dysfunction)      RIGHT VENTRICLE: The size was normal  Systolic function was normal  Wall thickness was normal     LEFT ATRIUM: Size was normal     RIGHT ATRIUM: Size was normal     MITRAL VALVE: Valve structure was normal  There was normal leaflet separation  Not well visualized  DOPPLER: The transmitral velocity was within the normal range  There was no evidence for stenosis  There was mild regurgitation  AORTIC VALVE: The valve was trileaflet  Leaflets exhibited normal thickness, mild calcification, and normal cuspal separation  DOPPLER: Transaortic velocity was within the normal range  There was no evidence for stenosis  There was mild  regurgitation  AI  ms  The regurgitant jet was directed centrally  TRICUSPID VALVE: The valve structure was normal  There was normal leaflet separation  DOPPLER: The transtricuspid velocity was within the normal range  There was no evidence for stenosis  There was mild regurgitation  Estimated peak PA  pressure was 25 mmHg  PULMONIC VALVE: Leaflets exhibited normal thickness, no calcification, and normal cuspal separation  DOPPLER: The transpulmonic velocity was within the normal range  There was trace regurgitation  PERICARDIUM: A small pericardial effusion was identified circumferential to the heart  There was no evidence of hemodynamic compromise  AORTA: The root exhibited normal size  SYSTEMIC VEINS: IVC: The inferior vena cava was normal in size and course  Respirophasic changes were normal  Estimated right atrial pressure of 3 mmHg, Estimated PASP 25 mmHg, Estimated PCWP of 11 4 mmHg (by E/e')      SYSTEM MEASUREMENT TABLES    2D  %FS: 37 4 %  Ao Diam: 2 9 cm  EDV(Teich): 42 1 ml  EF(Teich): 68 8 %  ESV(Teich): 13 1 ml  IVSd: 0 9 cm  LA Diam: 2 6 cm  LAAs A2C: 10 9 cm2  LAAs A4C: 13 8 cm2  LAESV A-L A2C: 22 3 ml  LAESV A-L A4C: 39 5 ml  LAESV Index (A-L): 19 7 ml/m2  LAESV MOD A2C: 20 4 ml  LAESV MOD A4C: 31 4 ml  LAESV(A-L): 31 1 ml  LAESV(MOD BP): 26 4 ml  LAESVInd MOD BP: 16 7 ml/m2  LALs A2C: 4 5 cm  LALs A4C: 4 1 cm  LVEDV MOD A4C: 53 7 ml  LVEF MOD A4C: 62 6 %  LVESV MOD A4C: 20 1 ml  LVIDd: 3 2 cm  LVIDs: 2 cm  LVLd A4C: 7 1 cm  LVLs A4C: 5 8 cm  LVOT Diam: 2 4 cm  LVPWd: 0 9 cm  RA Major: 5 3 cm  RA Minor: 3 cm  RV Major: 7 2 cm  RV Minor: 1 8 cm  RV base: 2 5 cm  RVIDd: 2 5 cm  SV MOD A4C: 33 6 ml  SV(Teich): 29 ml    CW  AR Dec [place]: 1 9 m/s2  AR Dec Time: 1931 8 ms  AR PHT: 560 2 ms  AR Vmax: 3 7 m/s  AR maxP mmHg  MV VTI: 18 3 cm  MV Vmax: 1 1 m/s  MV Vmean: 0 5 m/s  MV maxP 8 mmHg  MV meanP 2 mmHg  PRend P mmHg  PRend Vmax: 1 1 m/s  TR Vmax: 2 3 m/s  TR maxP 9 mmHg    MM  TAPSE: 3 4 cm    PW  E' Av 1 m/s  E' Lat: 0 1 m/s  E' Sept: 0 1 m/s  E/E' Av 3  E/E' Lat: 8 3  E/E' Sept: 6 5  LVOT Env  Ti: 284 5 ms  LVOT VTI: 16 2 cm  LVOT Vmax: 0 8 m/s  LVOT Vmean: 0 6 m/s  LVOT maxP 9 mmHg  LVOT meanP 5 mmHg  LVSI Dopp: 47 6 ml/m2  LVSV Dopp: 75 2 ml  MV A Yaya: 0 8 m/s  MV Dec [place]: 1 7 m/s2  MV DecT: 295 9 ms  MV E Yaya: 0 4 m/s  MV E/A Ratio: 0 5  MVA (VTI): 4 1 cm2  TV E' lat: 0 1 m/s    [employer] Accredited Echocardiography Laboratory    Prepared and electronically signed by    ACE Spencer  Signed 16-Mar-2021 10:34:57    No results found for this or any previous visit  No results found for this or any previous visit  No results found for this or any previous visit  Cardiac EP device report  CARELINK TRANSMISSION: LOOP RECORDER  PRESENTING RHYTHM NSR @ 72 BPM  BATTERY STATUS "OK " 11 TACHY EPISODES W/ EGRAMS SHOWING T WAVE OVERSENSING  1 AF EPISODE EGRAM CANNOT R/O AF  AF BURDEN = 0 1%  HX OF PAF  PT TAKES BISOPROLOL AND ELIQUIS  NO PATIENT ACTIVATED EPISODES  NORMAL DEVICE FUNCTION   DL        *-*-*-*-*-*-*-*-*-*-*-*-*-*-*-*-*-*-*-*-*-*-*-*-*-*-*-*-*-*-*-*-*-*-*-*-*-*-*-*-*-*-*-*-*-*-*-*-*-*-*-*-*-*-  SIGNATURES:   [unfilled]    MD Bella; A    *-*-*-*-*-*-*-*-*-*-*-*-*-*-*-*-*-*-*-*-*-*-*-*-*-*-*-*-*-*-*-*-*-*-*-*-*-*-*-*-*-*-*-*-*-*-*-*-*-*-*-*-*-*-  PAST MEDICAL HISTORY:  Past Medical History:   Diagnosis Date    Abnormal ECG     Arrhythmia     Asthma     Atrial fibrillation (HCC)     Autonomic dysfunction     Cancer (HCC)     lung, head and neck    Coronary artery disease     Disease of thyroid gland     Dysphagia     Gastroparesis     Heart murmur     mitral and aortic regurg    Hyperlipidemia     Hypertension     Irregular heart beat     PAST SURGICAL HISTORY:  Past Surgical History:   Procedure Laterality Date    CARDIAC CATHETERIZATION      CARDIAC LOOP RECORDER      ESOPHAGOGASTRODUODENOSCOPY W/ PEG      HEAD AND NECK DEBRIDEMENT      LUNG SURGERY Left     upper lobectomy    PEG TUBE PLACEMENT  01/15/2021    By Dr Kemar Sutton    PEG TUBE PLACEMENT  2017    PEG TUBE PLACEMENT  06/2020    Balloon gastrostomy-replaced 3 more times secondary to balloon failure and Balloon falling out         FAMILY HISTORY:  Family History   Problem Relation Age of Onset    Lung cancer Sister     SOCIAL HISTORY:  Social History     Tobacco Use   Smoking Status Former Smoker    Packs/day: 0 50    Years: 20 00    Pack years: 10 00   Smokeless Tobacco Never Used   Tobacco Comment    Quit at age 41      Social History     Substance and Sexual Activity   Alcohol Use Not Currently     Social History     Substance and Sexual Activity   Drug Use Never    [unfilled]     *-*-*-*-*-*-*-*-*-*-*-*-*-*-*-*-*-*-*-*-*-*-*-*-*-*-*-*-*-*-*-*-*-*-*-*-*-*-*-*-*-*-*-*-*-*-*-*-*-*-*-*-*-*  ALLERGIES:  Allergies   Allergen Reactions    Albuterol Other (See Comments)     Other reaction(s): Other (See Comments)  Afib  Causes afib      Methylprednisolone Other (See Comments)     Dizziness   Patient states adverse effect, "i feel weird in the head"    CURRENT SCHEDULED MEDICATIONS:    Current Outpatient Medications:     apixaban (ELIQUIS) 2 5 mg, Take 1 tablet (2 5 mg total) by mouth 2 (two) times a day, Disp: 180 tablet, Rfl: 0    Ascorbic Acid (vitamin C) 100 MG tablet, Take 3,000 mg by mouth daily , Disp: , Rfl:     atorvastatin (LIPITOR) 20 mg tablet, atorvastatin 20 mg tablet, Disp: , Rfl:     cholecalciferol (VITAMIN D3) 400 units tablet, Take 400 Units by mouth daily , Disp: , Rfl:     digoxin (LANOXIN) 0 125 mg tablet, Take 1 tablet (125 mcg total) by mouth every other day, Disp: 45 tablet, Rfl: 3    levothyroxine 112 mcg tablet, Take 112 mcg by mouth daily 100mg daily, Disp: , Rfl:     midodrine (PROAMATINE) 2 5 mg tablet, Take 1 tablet (2 5 mg total) by mouth 3 (three) times a day 3 times daily during daytime at least 6 hours apart    Do not take if blood pressure greater is greater than 130/80 , Disp: 90 tablet, Rfl: 1    pyridoxine (VITAMIN B6) 100 mg tablet, Take 100 mg by mouth daily (Patient not taking: Reported on 7/1/2021), Disp: , Rfl:      *-*-*-*-*-*-*-*-*-*-*-*-*-*-*-*-*-*-*-*-*-*-*-*-*-*-*-*-*-*-*-*-*-*-*-*-*-*-*-*-*-*-*-*-*-*-*-*-*-*-*-*-*-*

## 2021-07-30 NOTE — ASSESSMENT & PLAN NOTE
Ms Bing Cole seems to having orthostatic hypotension and symptomatic low blood pressures  She has discontinued her beta-blocker therapy recently  Device interrogation showed transient atrial tachycardia episodes  -- I am advising her to stay off bisoprolol for now  -- I am prescribing her midodrine  Advised her to take 2 5 mg to 3 times daily during the daytime when she is up and about  -- advising her to stay well hydrated at all times and avoid sudden rising from sitting or lying down position on and sudden turning from any position and sudden bending down and getting up  -- if symptoms persist he may benefit from physical therapy to train her to eat out to avoid symptom  -- she will continue to be followed in the device Clinic  If she has recurrent SVT episodes than will consider antiarrhythmic therapy  --advising her to improve her diet an to take multivitamins regularly  -- if symptoms of neuropathy persist may need formal evaluation

## 2021-08-03 NOTE — TELEPHONE ENCOUNTER
Best contact number for patient:  [phone]  Emergency Contact name and number:  [id number] : [phone]  Referring provider and telephone number:  Self referral  Primary Care Provider Name and if affiliated with [facility]:     Reason for Appointment/Dx:NP-Self referral(General)/60+ numbness legs(new to pt),General- dizziness,perpheral neuropathy, muscle weakness, memory deficit   Have you seen and followed up with a pediatric Neurologist for this disease in the past?      No (If yes ok to schedule with Dr Roberta Aly)    Neurology Location patient would like to be seen:    Order received? No                                                 Records Received? Yes    Have you ever seen another Neurologist?       Yes, [facility]-LOV 2/2021    Insurance Information    Insurance Name:    ID/Policy #:    Secondary Insurance:    ID/Policy#: Workman's Comp/ Accident/ School  Information      Workman's Comp/Accident/School related? No    If yes name of Insurance company:    Claim #:    Date of Injury:    Type of Injury:     Name and Telephone Number:    Notes:                   Appointment date: 9/21/21 @ 8AM, w/Dr Red Mclaughlin in [facility]  Verified address/phone/insurance-all current  Sent appt details/directions

## 2021-08-05 NOTE — TELEPHONE ENCOUNTER
Pt is calling recently saw dr Marj Jeffery and states the midodrine is helping a little states that she has increased palpitations  Denies any lightheadedness, dizziness, SOB, Chest pain or near syncope  She states it makes her feel "uncomfortable" and Dr Marj Jeffery told her to call if any issues  Please advise  She states did send a reading to device clinic

## 2021-08-09 NOTE — TELEPHONE ENCOUNTER
Patient calling again with same complaints as mentioned in previous phone call  Patient had same issues over the weekend and sent a device check to device clinic  She was wondering if they received it and if Dr Natalio May could look at it  Patient very upset she has not had a call back or response in regards to her complaints

## 2021-08-10 NOTE — TELEPHONE ENCOUNTER
P/c'd , not feeling well  Having hypotension when standing up   58/48  Then  with sitting  She is weak and shaky  Does not feel normal, sometimes feels like she is going to fall over  She said her normal HR 90's  She did upload yesterday( ST/ PVC's) and today( results not yet in Epic)  Episodes are unpredictable in time and severity  She has been trying to gedt help from Dr Vanessa Ospina but he is not getting back to her      Taking Midodrine 2 5 mg tid              Digoxin 0 125 mg qd      Please advise

## 2021-08-11 NOTE — TELEPHONE ENCOUNTER
Pt is calling stating she has left messages and not getting return calls for Dr Arsenio Schroeder,  Today states < had another situation felt faint and tachycardic lasted 1 minute sent transmission to device clinic she feels fine now Told her if feels poorly needs to go to ED  She states wants to discuss with Dr Arsenio Schroeder  Please contact patient  Tiger texted to Dr Arsenio Schroeder  Pt called 8/5 and 8/9  Please advise  Transmissions sent to device clinic

## 2021-08-12 PROBLEM — M51.36 LUMBAR DEGENERATIVE DISC DISEASE: Status: ACTIVE | Noted: 2021-01-01

## 2021-08-12 PROBLEM — R76.8: Status: ACTIVE | Noted: 2021-01-01

## 2021-08-12 PROBLEM — G90.9 AUTONOMIC DYSFUNCTION: Status: ACTIVE | Noted: 2021-01-01

## 2021-08-12 PROBLEM — G62.9 PERIPHERAL POLYNEUROPATHY: Status: ACTIVE | Noted: 2021-01-01

## 2021-08-12 PROBLEM — Z92.3 HISTORY OF RADIATION TO HEAD AND NECK REGION: Status: ACTIVE | Noted: 2021-01-01

## 2021-08-12 NOTE — TELEPHONE ENCOUNTER
Patient called last night at 4:15pm  asking for Dr Natali Burk again, and is calling the  several times this am  Suggested they give the phone call to Adam Ruby, to address

## 2021-08-12 NOTE — PROGRESS NOTES
[facility]LE [facility]  PATIENT:  Lorena Sotomayor  MRN:  [id number]  :  1940  DATE OF SERVICE:  2021    Assessment/Plan:           Problem List Items Addressed This Visit        Nervous and Auditory    Peripheral polyneuropathy    Autonomic dysfunction    Relevant Orders    Ambulatory referral to Physical Therapy       Musculoskeletal and Integument    Lumbar degenerative disc disease    Relevant Orders    Ambulatory referral to Physical Therapy       Other    Voltage-gated potassium channel (VGKC) antibody positive    History of radiation to head and neck region    Numbness in right leg - Primary    Episodic lightheadedness         Mrs Jamie Travis has presented to  [address] [place] [address] for evaluation of worsening sensory dysfunction involving upper and lower extremities  Patient had completed comprehensive evaluation at [facility] Neuromuscular [facility] by Dr Jayesh Mcrae with recommendations provided on 2/15/2021 to follow with [facility] Neuromuscular team due to high complexity of the case  Patient carries diagnosis of VGCC antibody positive status with remote history of Lung cancer s/p radiation to upper part of her chest/neck, Neuromuscular team deferred treatment of LEMS; Autonomic dysfunction with orthostatic hypotension on Midodrine; 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; Longstanding dizziness defined as lightheadedness; MGUS and severe sensory-motor polyneuropathy;   Patient showed frustration with [facility] team as she considered follow up with Neuromuscular clinic,and not MS specialist      Considering today's evaluation at [address] [place] [address] - we reviewed patient brain MRI and MRA head reports completed on 2020, we agreed, patient has no acute or chronic ischemic or hemorrhagic changes, no MS or CNS demyelination, no neoplasm, with patent vessels in brain and cervical regions   Prior MRI C-spine completed in  suggest against intrinsic lesions or demyelination, no concern for MS in this patient  Patient has scheduled MRI T-spine by her primary team, results are pending; patient described right-sided sensory dysfunction involving right side of her torso and RLE, from T5-T6 level and down;     MRI L-spine report was reviewed - multilevel degenerative changes discussed, patient was aware of her findings as she had declined surgical approach already  Patient does not have signs of Vertigo, orthostaic hypotension in the setting of ANS dysfunction, patient is already taking Midodrine 2 5 mg tid, adding salty food and good hydration discussed  I emphasized importance to follow recommendations of her [facility] Neuromuscular team and [facility] Neuromuscular team considering limitation of the care in [address] [place] [address] for LEMS  Patient request 3 months follow up with [address] [place] [address]  Subjective: worsening sensory dysfunction lower extremities, right torso, lightheaded;     HPI   Mrs Hussein Camargo is a 79 yo female who was referred to  [address] [place] [address] for evaluation of worsening numbness and ligthheadedness  Patient has comprehensive evaluation by [facility] Neurology/Neuromuscular team, as patient has established care with [facility] since 9/24/2019; Patient has established diagnosis of Autonomic dysfunction with orthostatic hypotension and ligthheadedness/near-syncope ( on Midodrine), generalized muscle weakness with VGCC positive findings on repeated testing with remote history of lung cancer; MGUS; severe sensory-motor peripheral polyneuropathy ( as per the chart review); vocal cord paralysis/stomy; advanced DDD lumbar region; Chart review was also consistent with patient question if she has MS or Parkinsonism, with NEGATIVE answer provided at that time  Patient repeatedly stated she had chest and upper neck radiation for lung cancer many years ago and since then she has sensory deficit on the right side of her body     Patient ambulates with no assistive devices;     MRI Brain/MRA head and neck 1/25/2020: Cerebral parenchyma: Normal size of the ventricles and extra-axial spaces for the patient's age  There is minimal amount of small white matter hyperintensities within bilateral cerebral hemispheres consistent with chronic white matter microvascular changes     Normal bilateral basal ganglia  Normal thalami  Involutional changes  No acute infarct or acute intracranial hemorrhage  No   suspicious mass or abnormal enhancement              Grossly unremarkable MRA of the head  Normal bilateral cervical carotid and vertebral arteries  MRI C-spine 7/2017- Multilevel spondylotic changes the cervical spine, as detailed above, without significant spinal canal stenosis  Moderate left foraminal narrowing at C4-5  Mild medialization of the left vocal fold, correlate for vocal cord paresis  MRI L-spine 8/11/2021: Slightly increased right paracentral disc protrusion at L5-S1, abutting the traversing right S1 nerve root  Neural foraminal narrowing at multiple levels, most prominently at L5-S1,   with abutment on the bilateral exiting L5 nerve roots  No high-grade spinal stenosis  Lateral recess stenosis is noted at multiple   levels  See above for detailed analysis  The following portions of the patient's history were reviewed and updated as appropriate:   She  has a past medical history of Abnormal ECG, Arrhythmia, Asthma, Atrial fibrillation ([address] ), Autonomic dysfunction, Cancer ([address] ), Coronary artery disease, Disease of thyroid gland, Dysphagia, Gastroparesis, Heart murmur, Hyperlipidemia, Hypertension, and Irregular heart beat    She   Patient Active Problem List    Diagnosis Date Noted    Palpitations 08/13/2021    UTI (urinary tract infection) 08/13/2021    Numbness in right leg 08/13/2021    Episodic lightheadedness 08/13/2021    Voltage-gated potassium channel (VGKC) antibody positive 08/12/2021    Lumbar degenerative disc disease 08/12/2021    Peripheral polyneuropathy 08/12/2021    History of radiation to head and neck region 08/12/2021    Autonomic dysfunction 08/12/2021    Nonrheumatic tricuspid valve regurgitation 04/26/2021    Shortness of breath 03/15/2021    Encounter for care related to feeding tube 02/05/2021    Dysphagia 12/23/2020    Paroxysmal atrial fibrillation ([facility] ) 09/17/2020    History of lung cancer in adulthood 09/17/2020    Supraventricular tachycardia ([facility] ) 09/17/2020    Centrilobular emphysema ([facility] ) 09/17/2020    History of lobectomy of lung 09/17/2020    History of head and neck radiation 09/17/2020    Moderate protein-calorie malnutrition ([facility] ) 09/17/2020    Orthostatic hypotension 09/17/2020    Coronary artery disease involving native coronary artery of native heart without angina pectoris 09/17/2020    Mitral and aortic valve regurgitation 98/39/2997    Diastolic dysfunction without heart failure 09/17/2020    Pericardial effusion 09/17/2020    S/P percutaneous endoscopic gastrostomy (PEG) tube placement ([facility] ) 08/21/2018     She  has a past surgical history that includes Cardiac catheterization; Cardiac Loop Recorder; Head and Neck Debridement; Esophagogastroduodenoscopy w/ PEG; Lung surgery (Left); PEG tube placement (01/15/2021); PEG tube placement (2017); and PEG tube placement (06/2020)  Her family history includes Lung cancer in her sister  She  reports that she has quit smoking  She has a 10 00 pack-year smoking history  She has never used smokeless tobacco  She reports previous alcohol use  She reports that she does not use drugs  No current facility-administered medications for this visit       Current Outpatient Medications   Medication Sig Dispense Refill    apixaban (ELIQUIS) 2 5 mg Take 1 tablet (2 5 mg total) by mouth 2 (two) times a day 180 tablet 0    Ascorbic Acid (vitamin C) 100 MG tablet Take 3,000 mg by mouth daily       atorvastatin (LIPITOR) 20 mg tablet atorvastatin 20 mg tablet      cholecalciferol (VITAMIN D3) 400 units tablet Take 400 Units by mouth daily       digoxin (LANOXIN) 0 125 mg tablet Take 1 tablet (125 mcg total) by mouth every other day 45 tablet 3    levothyroxine 112 mcg tablet Take 112 mcg by mouth daily 100mg daily      midodrine (PROAMATINE) 2 5 mg tablet Take 1 tablet (2 5 mg total) by mouth 3 (three) times a day 3 times daily during daytime at least 6 hours apart  Do not take if blood pressure greater is greater than 130/80  90 tablet 1     Facility-Administered Medications Ordered in Other Visits   Medication Dose Route Frequency Provider Last Rate Last Admin    ceftriaxone (ROCEPHIN) 1 g/50 mL in dextrose IVPB  1,000 mg Intravenous Q24H Gagan Crowley MD         No current facility-administered medications on file prior to visit  Current Outpatient Medications on File Prior to Visit   Medication Sig    apixaban (ELIQUIS) 2 5 mg Take 1 tablet (2 5 mg total) by mouth 2 (two) times a day    Ascorbic Acid (vitamin C) 100 MG tablet Take 3,000 mg by mouth daily     atorvastatin (LIPITOR) 20 mg tablet atorvastatin 20 mg tablet    cholecalciferol (VITAMIN D3) 400 units tablet Take 400 Units by mouth daily     digoxin (LANOXIN) 0 125 mg tablet Take 1 tablet (125 mcg total) by mouth every other day    levothyroxine 112 mcg tablet Take 112 mcg by mouth daily 100mg daily    midodrine (PROAMATINE) 2 5 mg tablet Take 1 tablet (2 5 mg total) by mouth 3 (three) times a day 3 times daily during daytime at least 6 hours apart  Do not take if blood pressure greater is greater than 130/80   [DISCONTINUED] bisoprolol (ZEBETA) 5 mg tablet Take 0 5 tablets (2 5 mg total) by mouth daily (Patient not taking: Reported on 7/30/2021)     She is allergic to albuterol and methylprednisolone            Objective:    Blood pressure 155/68, pulse 91, temperature 97 5 °F (36 4 °C), temperature source Skin, height 5' 5" (1 651 m), weight 54 4 kg (119 lb 14 4 oz)  Physical Exam    Neurological Exam  CONSTITUTIONAL: NAD, pleasant  NECK: supple, no lymphadenopathy, no thyromegaly, no JVD  CARDIOVASCULAR: RRR, normal S1S2, no murmurs, no rubs  RESP: clear to auscultation bilaterally, no wheezes/rhonchi/rales  ABDOMEN: soft, non tender, non distended  SKIN: no rash or skin lesions  EXTREMITIES: no edema, pulses 2+bilaterally  PSYCH: appropriate mood and affect  NEUROLOGIC COMPREHENSIVE EXAM: Patient is oriented to person, place and time, NAD; appropriate affect  CN II, III, IV, V, VI, VII,VIII,IX,X,XI-XII intact with EOMI,and vocal cord paralysis; PERRLA, OKN intact, VF grossly intact, fundi poorly visualized secondary to pupillary constriction; symmetric face noted  Motor: 5/5 UE/LE bilateral symmetric,4/5 finger extension b/l;  4/5 hip flexion b/l; Sensory: intact to light touch and pinprick bilaterally; normal vibration sensation feet bilaterally; Coordination within normal limits on FTN and ELOISA testing; DTR: 2+/4 through, no Babinski, no clonus  Tandem gait is abnormal  Romberg: positive  ROS:  12 points of review of system was reviewed with the patient and was unremarkable with exception: see HPI  Review of Systems   Constitutional: Negative  Negative for appetite change and fever  HENT: Negative  Negative for hearing loss, tinnitus, trouble swallowing and voice change  Eyes: Negative  Negative for photophobia and pain  Respiratory: Negative  Negative for shortness of breath  Cardiovascular: Positive for palpitations  Gastrointestinal: Negative  Negative for nausea and vomiting  Endocrine: Negative  Negative for cold intolerance  Genitourinary: Negative  Negative for dysuria, frequency and urgency  Musculoskeletal: Negative  Negative for myalgias and neck pain  Skin: Negative  Negative for rash  Neurological: Positive for light-headedness and numbness (right side )   Negative for dizziness, tremors, seizures, syncope, facial asymmetry, speech difficulty, weakness and headaches  Hematological: Negative  Does not bruise/bleed easily  Psychiatric/Behavioral: Negative  Negative for confusion, hallucinations and sleep disturbance

## 2021-08-12 NOTE — LETTER
2021     Jose Orta DO  [address]  [address] [place] [address]    Patient: Donald Mendieta   YOB: 1940   Date of Visit: 2021       Dear Dr Tray Benavidez: Thank you for referring Marla Blake to me for evaluation  Below are my notes for this consultation  If you have questions, please do not hesitate to call me  I look forward to following your patient along with you  Sincerely,        Bert Perez MD        CC: DO Bert Dixon MD  2021  1:05 PM  Sign when Signing Visit  [facility] [place]  PATIENT:  Donald Mendieta  MRN:  [id number]  :  1940  DATE OF SERVICE:  2021    Assessment/Plan:           Problem List Items Addressed This Visit        Nervous and Auditory    Peripheral polyneuropathy    Autonomic dysfunction    Relevant Orders    Ambulatory referral to Physical Therapy       Musculoskeletal and Integument    Lumbar degenerative disc disease    Relevant Orders    Ambulatory referral to Physical Therapy       Other    Voltage-gated potassium channel (VGKC) antibody positive    History of radiation to head and neck region    Numbness in right leg - Primary    Episodic lightheadedness         Mrs Dani Oconnor has presented to  [address] [place] [address] for evaluation of worsening sensory dysfunction involving upper and lower extremities  Patient had completed comprehensive evaluation at [address] [place] [address] Neuromuscular Clinic by Dr Tray Benavidez with recommendations provided on 2/15/2021 to follow with [facility] Neuromuscular team due to high complexity of the case  Patient carries diagnosis of VGCC antibody positive status with remote history of Lung cancer s/p radiation to upper part of her chest/neck, Neuromuscular team deferred treatment of LEMS; Autonomic dysfunction with orthostatic hypotension on Midodrine; 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;  Longstanding dizziness defined as lightheadedness; MGUS and severe sensory-motor polyneuropathy;   Patient showed frustration with [facility] team as she considered follow up with Neuromuscular clinic,and not MS specialist      Considering today's evaluation at [address] [place] [address] - we reviewed patient brain MRI and MRA head reports completed on 1/25/2020, we agreed, patient has no acute or chronic ischemic or hemorrhagic changes, no MS or CNS demyelination, no neoplasm, with patent vessels in brain and cervical regions  Prior MRI C-spine completed in 2017 suggest against intrinsic lesions or demyelination, no concern for MS in this patient  Patient has scheduled MRI T-spine by her primary team, results are pending; patient described right-sided sensory dysfunction involving right side of her torso and RLE, from T5-T6 level and down;     MRI L-spine report was reviewed - multilevel degenerative changes discussed, patient was aware of her findings as she had declined surgical approach already  Patient does not have signs of Vertigo, orthostaic hypotension in the setting of ANS dysfunction, patient is already taking Midodrine 2 5 mg tid, adding salty food and good hydration discussed  I emphasized importance to follow recommendations of her [facility] Neuromuscular team and [facility] Neuromuscular team considering limitation of the care in [address] [place] [address] for LEMS  Patient request 3 months follow up with [address] [place] [address]  Subjective: worsening sensory dysfunction lower extremities, right torso, lightheaded;     HPI   Mrs Vanessa Calles is a 79 yo female who was referred to  [facility] for evaluation of worsening numbness and ligthheadedness   Patient has comprehensive evaluation by [facility] Neurology/Neuromuscular team, as patient has established care with [facility] since 9/24/2019; Patient has established diagnosis of Autonomic dysfunction with orthostatic hypotension and ligthheadedness/near-syncope ( on Midodrine), generalized muscle weakness with VGCC positive findings on repeated testing with remote history of lung cancer; MGUS; severe sensory-motor peripheral polyneuropathy ( as per the chart review); vocal cord paralysis/stomy; advanced DDD lumbar region; Chart review was also consistent with patient question if she has MS or Parkinsonism, with NEGATIVE answer provided at that time  Patient repeatedly stated she had chest and upper neck radiation for lung cancer many years ago and since then she has sensory deficit on the right side of her body  Patient ambulates with no assistive devices;     MRI Brain/MRA head and neck 1/25/2020: Cerebral parenchyma: Normal size of the ventricles and extra-axial spaces for the patient's age  There is minimal amount of small white matter hyperintensities within bilateral cerebral hemispheres consistent with chronic white matter microvascular changes     Normal bilateral basal ganglia  Normal thalami  Involutional changes  No acute infarct or acute intracranial hemorrhage  No   suspicious mass or abnormal enhancement              Grossly unremarkable MRA of the head  Normal bilateral cervical carotid and vertebral arteries  MRI C-spine 7/2017- Multilevel spondylotic changes the cervical spine, as detailed above, without significant spinal canal stenosis  Moderate left foraminal narrowing at C4-5  Mild medialization of the left vocal fold, correlate for vocal cord paresis  MRI L-spine 8/11/2021: Slightly increased right paracentral disc protrusion at L5-S1, abutting the traversing right S1 nerve root  Neural foraminal narrowing at multiple levels, most prominently at L5-S1,   with abutment on the bilateral exiting L5 nerve roots  No high-grade spinal stenosis  Lateral recess stenosis is noted at multiple   levels  See above for detailed analysis         The following portions of the patient's history were reviewed and updated as appropriate:   She  has a past medical history of Abnormal ECG, Arrhythmia, Asthma, [facility] fibrillation [facility] ), Autonomic dysfunction, Cancer ([facility] ), Coronary artery disease, Disease of thyroid gland, Dysphagia, Gastroparesis, Heart murmur, Hyperlipidemia, Hypertension, and Irregular heart beat    She   Patient Active Problem List    Diagnosis Date Noted    Palpitations 08/13/2021    UTI (urinary tract infection) 08/13/2021    Numbness in right leg 08/13/2021    Episodic lightheadedness 08/13/2021    Voltage-gated potassium channel (VGKC) antibody positive 08/12/2021    Lumbar degenerative disc disease 08/12/2021    Peripheral polyneuropathy 08/12/2021    History of radiation to head and neck region 08/12/2021    Autonomic dysfunction 08/12/2021    Nonrheumatic tricuspid valve regurgitation 04/26/2021    Shortness of breath 03/15/2021    Encounter for care related to feeding tube 02/05/2021    Dysphagia 12/23/2020    Paroxysmal atrial fibrillation ([facility] ) 09/17/2020    History of lung cancer in adulthood 09/17/2020    Supraventricular tachycardia ([facility] ) 09/17/2020    Centrilobular emphysema ([facility] ) 09/17/2020    History of lobectomy of lung 09/17/2020    History of head and neck radiation 09/17/2020    Moderate protein-calorie malnutrition ([facility] ) 09/17/2020    Orthostatic hypotension 09/17/2020    Coronary artery disease involving native coronary artery of native heart without angina pectoris 09/17/2020    Mitral and aortic valve regurgitation 90/70/2589    Diastolic dysfunction without heart failure 09/17/2020    Pericardial effusion 09/17/2020    S/P percutaneous endoscopic gastrostomy (PEG) tube placement ([facility] ) 08/21/2018     She  has a past surgical history that includes Cardiac catheterization; Cardiac Loop Recorder; Head and Neck Debridement; Esophagogastroduodenoscopy w/ PEG; Lung surgery (Left); PEG tube placement (01/15/2021); PEG tube placement (2017); and PEG tube placement (06/2020)  Her family history includes Lung cancer in her sister  She  reports that she has quit smoking  She has a 10 00 pack-year smoking history  She has never used smokeless tobacco  She reports previous alcohol use  She reports that she does not use drugs  No current facility-administered medications for this visit  Current Outpatient Medications   Medication Sig Dispense Refill    apixaban (ELIQUIS) 2 5 mg Take 1 tablet (2 5 mg total) by mouth 2 (two) times a day 180 tablet 0    Ascorbic Acid (vitamin C) 100 MG tablet Take 3,000 mg by mouth daily       atorvastatin (LIPITOR) 20 mg tablet atorvastatin 20 mg tablet      cholecalciferol (VITAMIN D3) 400 units tablet Take 400 Units by mouth daily       digoxin (LANOXIN) 0 125 mg tablet Take 1 tablet (125 mcg total) by mouth every other day 45 tablet 3    levothyroxine 112 mcg tablet Take 112 mcg by mouth daily 100mg daily      midodrine (PROAMATINE) 2 5 mg tablet Take 1 tablet (2 5 mg total) by mouth 3 (three) times a day 3 times daily during daytime at least 6 hours apart  Do not take if blood pressure greater is greater than 130/80  90 tablet 1     Facility-Administered Medications Ordered in Other Visits   Medication Dose Route Frequency Provider Last Rate Last Admin    ceftriaxone (ROCEPHIN) 1 g/50 mL in dextrose IVPB  1,000 mg Intravenous Q24H Keaton Jones MD         No current facility-administered medications on file prior to visit       Current Outpatient Medications on File Prior to Visit   Medication Sig    apixaban (ELIQUIS) 2 5 mg Take 1 tablet (2 5 mg total) by mouth 2 (two) times a day    Ascorbic Acid (vitamin C) 100 MG tablet Take 3,000 mg by mouth daily     atorvastatin (LIPITOR) 20 mg tablet atorvastatin 20 mg tablet    cholecalciferol (VITAMIN D3) 400 units tablet Take 400 Units by mouth daily     digoxin (LANOXIN) 0 125 mg tablet Take 1 tablet (125 mcg total) by mouth every other day    levothyroxine 112 mcg tablet Take 112 mcg by mouth daily 100mg daily    midodrine (PROAMATINE) 2 5 mg tablet Take 1 tablet (2 5 mg total) by mouth 3 (three) times a day 3 times daily during daytime at least 6 hours apart  Do not take if blood pressure greater is greater than 130/80   [DISCONTINUED] bisoprolol (ZEBETA) 5 mg tablet Take 0 5 tablets (2 5 mg total) by mouth daily (Patient not taking: Reported on 7/30/2021)     She is allergic to albuterol and methylprednisolone            Objective:    Blood pressure 155/68, pulse 91, temperature 97 5 °F (36 4 °C), temperature source Skin, height 5' 5" (1 651 m), weight 54 4 kg (119 lb 14 4 oz)  Physical Exam    Neurological Exam  CONSTITUTIONAL: NAD, pleasant  NECK: supple, no lymphadenopathy, no thyromegaly, no JVD  CARDIOVASCULAR: RRR, normal S1S2, no murmurs, no rubs  RESP: clear to auscultation bilaterally, no wheezes/rhonchi/rales  ABDOMEN: soft, non tender, non distended  SKIN: no rash or skin lesions  EXTREMITIES: no edema, pulses 2+bilaterally  PSYCH: appropriate mood and affect  NEUROLOGIC COMPREHENSIVE EXAM: Patient is oriented to person, place and time, NAD; appropriate affect  CN II, III, IV, V, VI, VII,VIII,IX,X,XI-XII intact with EOMI,and vocal cord paralysis; PERRLA, OKN intact, VF grossly intact, fundi poorly visualized secondary to pupillary constriction; symmetric face noted  Motor: 5/5 UE/LE bilateral symmetric,4/5 finger extension b/l;  4/5 hip flexion b/l; Sensory: intact to light touch and pinprick bilaterally; normal vibration sensation feet bilaterally; Coordination within normal limits on FTN and ELOISA testing; DTR: 2+/4 through, no Babinski, no clonus  Tandem gait is abnormal  Romberg: positive  ROS:  12 points of review of system was reviewed with the patient and was unremarkable with exception: see HPI  Review of Systems   Constitutional: Negative  Negative for appetite change and fever  HENT: Negative    Negative for hearing loss, tinnitus, trouble swallowing and voice change  Eyes: Negative  Negative for photophobia and pain  Respiratory: Negative  Negative for shortness of breath  Cardiovascular: Positive for palpitations  Gastrointestinal: Negative  Negative for nausea and vomiting  Endocrine: Negative  Negative for cold intolerance  Genitourinary: Negative  Negative for dysuria, frequency and urgency  Musculoskeletal: Negative  Negative for myalgias and neck pain  Skin: Negative  Negative for rash  Neurological: Positive for light-headedness and numbness (right side )  Negative for dizziness, tremors, seizures, syncope, facial asymmetry, speech difficulty, weakness and headaches  Hematological: Negative  Does not bruise/bleed easily  Psychiatric/Behavioral: Negative  Negative for confusion, hallucinations and sleep disturbance

## 2021-08-13 PROBLEM — R00.2 PALPITATIONS: Status: ACTIVE | Noted: 2021-01-01

## 2021-08-13 PROBLEM — R42 EPISODIC LIGHTHEADEDNESS: Status: ACTIVE | Noted: 2021-01-01

## 2021-08-13 PROBLEM — R20.0 NUMBNESS IN RIGHT LEG: Status: ACTIVE | Noted: 2021-01-01

## 2021-08-13 PROBLEM — N39.0 UTI (URINARY TRACT INFECTION): Status: ACTIVE | Noted: 2021-01-01

## 2021-08-13 NOTE — CONSULTS
Cardiology Consultation  MD Natalia Goldstein MD Starlet Alf, DO, MD Max Villatoro DO, Gaudencio Rodriguez DO, [facility]  ----------------------------------------------------------------  [address] [place] [address] 78 y o  female MRN: [id number]  Unit/Bed#: E4 -01 Encounter: [id number]      Reason for Consultation: Palpitations/PAF      ASSESSMENT:    Palpitations   Paroxysmal atrial fibrillation on Eliquis s/p Med[employer] ILR   Urinary tract infection   Orthostatic hypotension   Pharmacologic nuclear stress test negative for myocardial ischemia, gated EF 73%, June 2021   LVEF 64%, grade 1 diastolic dysfunction, mild MR/AR/TR w/ PASP 25 mmHg, March 2021   Nonobstructive coronary disease by cardiac catheterization, 2018   Emphysema   History of lung cancer s/p left upper lung lobectomy   History of PEG tube    PLAN:  Patient has had palpitations and reportedly was seen to have atrial fibrillation on telemetry by EMS  ECG showed 4-5 seconds of atrial fibrillation with conversion to sinus rhythm  Will interrogate loop recorder and discuss case with electrophysiology  Eliquis for anticoagulation dose for weight and age  Continue statin, digoxin and midodrine  Antibiotics as per primary team  Further recommendations to follow interrogation    Signed: Janneth Crum DO, [facility], [facility]      History of Present Illness:  Padmini Dominguez is a 78 y o  female with Orthostatic hypotension, emphysema, paroxysmal atrial fibrillation on Eliquis and PEG tube placement presented with palpitations, lightheadedness and dizziness  Due to her symptoms, she had call 911 and EMS arrived  On telemetry, she reportedly was found to have atrial fibrillation with RVR and heart rates of 120 beats per minute as well as PVCs  Atrial fibrillation and symptoms resolved spontaneously on the way to [facility]    In the emergency department, she was found to have findings of urinary tract infection  We have been asked to see her regarding her potential episode of atrial fibrillation  In the past, she has seen Dr Delores Sanchez who was considering initiation of antiarrhythmic drug therapy for any further episodes of atrial fibrillation  She has had difficulty with beta-blocker due to hypotension  She denies any chest pain, pressure, tightness or squeezing  Denies lower extremity swelling, orthopnea or paroxysmal nocturnal dyspnea  Denies loss of consciousness  Since admission, she has had no further episodes  Review of Systems:  Review of Systems   Constitutional: Negative for decreased appetite, fever, weight gain and weight loss  HENT: Negative for congestion and sore throat  Eyes: Negative for visual disturbance  Cardiovascular: Positive for palpitations  Negative for chest pain, dyspnea on exertion, leg swelling and near-syncope  Respiratory: Negative for cough and shortness of breath  Hematologic/Lymphatic: Negative for bleeding problem  Skin: Negative for rash  Musculoskeletal: Negative for myalgias and neck pain  Gastrointestinal: Negative for abdominal pain and nausea  Neurological: Positive for light-headedness  Negative for weakness  Psychiatric/Behavioral: Negative for depression           Past Medical History:   Diagnosis Date    Abnormal ECG     Arrhythmia     Asthma     Atrial fibrillation (HCC)     Autonomic dysfunction     Cancer (HCC)     lung, head and neck    Coronary artery disease     Disease of thyroid gland     Dysphagia     Gastroparesis     Heart murmur     mitral and aortic regurg    Hyperlipidemia     Hypertension     Irregular heart beat        Past Surgical History:   Procedure Laterality Date    CARDIAC CATHETERIZATION      CARDIAC LOOP RECORDER      ESOPHAGOGASTRODUODENOSCOPY W/ PEG      HEAD AND NECK DEBRIDEMENT      LUNG SURGERY Left     upper lobectomy    PEG TUBE PLACEMENT  01/15/2021    By Dr Radha Pulido  2017    PEG TUBE PLACEMENT  06/2020    Balloon gastrostomy-replaced 3 more times secondary to balloon failure and Balloon falling out       Social History     Socioeconomic History    Marital status: Unknown     Spouse name: None    Number of children: None    Years of education: None    Highest education level: None   Occupational History    Occupation: retired - /   Tobacco Use    Smoking status: Former Smoker     Packs/day: 0 50     Years: 20 00     Pack years: 10 00    Smokeless tobacco: Never Used    Tobacco comment: Quit at age 41   Vaping Use    Vaping Use: Never used   Substance and Sexual Activity    Alcohol use: Not Currently    Drug use: Never    Sexual activity: None   Other Topics Concern    None   Social History Narrative    None     Social Determinants of Health     Financial Resource Strain:     Difficulty of Paying Living Expenses:    Food Insecurity:     Worried About Running Out of Food in the Last Year:     Ran Out of Food in the Last Year:    Transportation Needs:     Lack of Transportation (Medical):  Lack of Transportation (Non-Medical):    Physical Activity:     Days of Exercise per Week:     Minutes of Exercise per Session:    Stress:     Feeling of Stress :    Social Connections:     Frequency of Communication with Friends and Family:     Frequency of Social Gatherings with Friends and Family:     Attends Yazidi Services:     Active Member of Clubs or Organizations:     Attends Club or Organization Meetings:     Marital Status:    Intimate Partner Violence:     Fear of Current or Ex-Partner:     Emotionally Abused:     Physically Abused:     Sexually Abused:        Family History   Problem Relation Age of Onset    Lung cancer Sister        Allergies   Allergen Reactions    Albuterol Other (See Comments)     Other reaction(s):  Other (See Comments)  Afib  Causes afib      Methylprednisolone Other (See Comments)     Dizziness  Patient states adverse effect, "i feel weird in the head"       No current facility-administered medications on file prior to encounter  Current Outpatient Medications on File Prior to Encounter   Medication Sig    apixaban (ELIQUIS) 2 5 mg Take 1 tablet (2 5 mg total) by mouth 2 (two) times a day    Ascorbic Acid (vitamin C) 100 MG tablet Take 3,000 mg by mouth daily     atorvastatin (LIPITOR) 20 mg tablet atorvastatin 20 mg tablet    cholecalciferol (VITAMIN D3) 400 units tablet Take 400 Units by mouth daily     digoxin (LANOXIN) 0 125 mg tablet Take 1 tablet (125 mcg total) by mouth every other day    levothyroxine 112 mcg tablet Take 112 mcg by mouth daily 100mg daily    midodrine (PROAMATINE) 2 5 mg tablet Take 1 tablet (2 5 mg total) by mouth 3 (three) times a day 3 times daily during daytime at least 6 hours apart  Do not take if blood pressure greater is greater than 130/80      [DISCONTINUED] bisoprolol (ZEBETA) 5 mg tablet Take 0 5 tablets (2 5 mg total) by mouth daily (Patient not taking: Reported on 7/30/2021)        Current Facility-Administered Medications   Medication Dose Route Frequency Provider Last Rate    acetaminophen  650 mg Oral Q6H PRN Kathi Padgett MD      apixaban  2 5 mg Oral BID Kathi Padgett MD      atorvastatin  20 mg Oral Daily With Breana Francis MD      cefTRIAXone  1,000 mg Intravenous Q24H Kathi Padgett MD 1,000 mg (08/13/21 1326)    digoxin  125 mcg Oral Every Other Day Kathi Padgett MD      levothyroxine  112 mcg Oral Early Morning Kathi Padgett MD      midodrine  2 5 mg Oral TID Kathi Padgett MD      ondansetron  4 mg Intravenous Q6H PRN Kathi Padgett MD              Vitals:    08/13/21 1132 08/13/21 1134 08/13/21 1322 08/13/21 1346   BP: 143/64 105/59 126/58 114/70   BP Location: Right arm Left arm Right arm Left arm   Pulse: 68 79 73 74   Resp: 20 20 17 18   Temp:    97 8 °F (36 6 °C)   TempSrc:    Temporal   SpO2: 98% 99% 99% 100%   Weight:    52 6 kg (116 lb)       No intake/output data recorded  No intake or output data in the 24 hours ending 21 1521    Weight change:     PHYSICAL EXAMINATION:  Gen: Awake, Alert, NAD  Head/eyes: AT/NC, pupils equal and round, Anicteric  ENT: mmm  Neck: Supple, No elevated JVP, trachea midline  Resp: CTA bilaterally no w/r/r  CV: RRR +S1, S2, No m/r/g  Abd: Soft, NT/ND + BS  Ext: no LE edema bilaterally  Neuro: Follows commands, moves all extermities  Psych: Appropriate affect, normal mood, pleasant attitude, non-combative  Skin: warm; no rash, erythema or venous stasis changes on exposed skin    Lab Results:  Results from last 7 days   Lab Units 21  1005   WBC Thousand/uL 8 85   HEMOGLOBIN g/dL 12 5   HEMATOCRIT % 39 3   PLATELETS Thousands/uL 265     Results from last 7 days   Lab Units 21  1005   POTASSIUM mmol/L 4 2   CHLORIDE mmol/L 102   CO2 mmol/L 27   BUN mg/dL 32*   CREATININE mg/dL 0 84   CALCIUM mg/dL 9 6   ALK PHOS U/L 107   ALT U/L 6*   AST U/L 28     No results found for: TROPONINT      Results from last 7 days   Lab Units 21  1005   TROPONIN I ng/mL <0 02     Results from last 7 days   Lab Units 21  1005   DIGOXIN LVL ng/mL 0 9     Results from last 7 days   Lab Units 21  1005   INR  1 21*           Results for orders placed during the hospital encounter of 03/15/21    Echo complete with contrast if indicated    Narrative  [facility] hospital[facility] [place] [address]  [place], [address]  [phone]    Transthoracic Echocardiogram    Study date:  16-Mar-2021    Patient: Harvinder Pruitt  MR number: [id number]  Account number: [de-identified]  : 1940  Age: 78 years  Gender: Female  Status: Outpatient  Location: Bedside  Height: 65 in  Weight: 117 7 lb  BP: 136/ 71 mmHg    Indications: abnormal heart sound      Diagnoses: R01 1 - Cardiac murmur, unspecified    Sonographer:  [facility]  Primary Physician:  Dinesh Worthington DO  Referring Physician:  Mellissa Ferreira Do  Group:  [facility]  Interpreting Physician:  ACE Harrell     SUMMARY    LEFT VENTRICLE:  Systolic function was normal by visual assessment  Ejection fraction was estimated to be 60 %  There were no regional wall motion abnormalities  Doppler parameters were consistent with abnormal left ventricular relaxation (grade 1 diastolic dysfunction)  MITRAL VALVE:  There was mild regurgitation  AORTIC VALVE:  There was mild regurgitation  AI  ms  TRICUSPID VALVE:  There was mild regurgitation  Estimated peak PA pressure was 25 mmHg  PULMONIC VALVE:  There was trace regurgitation  IVC, HEPATIC VEINS:  Respirophasic changes were normal  Estimated right atrial pressure of 3 mmHg, Estimated PASP 25 mmHg, Estimated PCWP of 11 4 mmHg (by E/e')  PERICARDIUM:  A small pericardial effusion was identified circumferential to the heart  There was no evidence of hemodynamic compromise  SUMMARY MEASUREMENTS  CW measurements:  Unspecified Anatomy:   AR Dec Garden was 1 9 m/s2  AR Dec Time was 1931 8 ms  AR PHT was 560 2 ms  AR Vmax was 3 7 m/s  AR maxPG was 54 mmHg  MV VTI was 18 3 cm  MV Vmax was 1 1 m/s  MV Vmean was 0 5 m/s  MV maxPG was 4 8 mmHg  MV  meanPG was 1 2 mmHg  PRend PG was 5 mmHg  PRend Vmax was 1 1 m/s  TR Vmax was 2 3 m/s  TR maxPG was 21 9 mmHg  MM measurements:  Unspecified Anatomy:   TAPSE was 3 4 cm  PW measurements:  Unspecified Anatomy:   E' Avg was 0 1 m/s  E' Lat was 0 1 m/s  E' Sept was 0 1 m/s  E/E' Avg was 7 3   E/E' Lat was 8 3   E/E' Sept was 6 5   LVOT Env  Ti was 284 5 ms  LVOT VTI was 16 2 cm  LVOT Vmax was 0 8 m/s  LVOT Vmean was  0 6 m/s  LVOT maxPG was 2 9 mmHg  LVOT meanPG was 1 5 mmHg  LVSI Dopp was 47 6 ml/m2  LVSV Dopp was 75 2 ml   MV A Yaya was 0 8 m/s    MV Dec Garden was 1 7 m/s2   MV DecT was 295 9 ms   MV E Yaya was 0 4 m/s  MV E/A Ratio was 0 5   MVA  (VTI) was 4 1 cm2   TV E' lat was 0 1 m/s  HISTORY: PRIOR HISTORY: shortness of breath, coronary artery disease, arrhythmia, atrial fibrillation, cardiac murmur, hyperlipidemia, hypertension  PROCEDURE: The procedure was performed at the bedside  This was a stat study  The transthoracic approach was used  The heart rate was 79 bpm, at the start of the study  Images were obtained from the parasternal, apical, subcostal, and  suprasternal notch acoustic windows  Image quality was adequate  LEFT VENTRICLE: Size was normal  Systolic function was normal by visual assessment  Ejection fraction was estimated to be 60 %  There were no regional wall motion abnormalities  Wall thickness was normal  DOPPLER: Doppler parameters were  consistent with abnormal left ventricular relaxation (grade 1 diastolic dysfunction)  RIGHT VENTRICLE: The size was normal  Systolic function was normal  Wall thickness was normal     LEFT ATRIUM: Size was normal     RIGHT ATRIUM: Size was normal     MITRAL VALVE: Valve structure was normal  There was normal leaflet separation  Not well visualized  DOPPLER: The transmitral velocity was within the normal range  There was no evidence for stenosis  There was mild regurgitation  AORTIC VALVE: The valve was trileaflet  Leaflets exhibited normal thickness, mild calcification, and normal cuspal separation  DOPPLER: Transaortic velocity was within the normal range  There was no evidence for stenosis  There was mild  regurgitation  AI  ms  The regurgitant jet was directed centrally  TRICUSPID VALVE: The valve structure was normal  There was normal leaflet separation  DOPPLER: The transtricuspid velocity was within the normal range  There was no evidence for stenosis  There was mild regurgitation  Estimated peak PA  pressure was 25 mmHg      PULMONIC VALVE: Leaflets exhibited normal thickness, no calcification, and normal cuspal separation  DOPPLER: The transpulmonic velocity was within the normal range  There was trace regurgitation  PERICARDIUM: A small pericardial effusion was identified circumferential to the heart  There was no evidence of hemodynamic compromise  AORTA: The root exhibited normal size  SYSTEMIC VEINS: IVC: The inferior vena cava was normal in size and course  Respirophasic changes were normal  Estimated right atrial pressure of 3 mmHg, Estimated PASP 25 mmHg, Estimated PCWP of 11 4 mmHg (by E/e')  SYSTEM MEASUREMENT TABLES    2D  %FS: 37 4 %  Ao Diam: 2 9 cm  EDV(Teich): 42 1 ml  EF(Teich): 68 8 %  ESV(Teich): 13 1 ml  IVSd: 0 9 cm  LA Diam: 2 6 cm  LAAs A2C: 10 9 cm2  LAAs A4C: 13 8 cm2  LAESV A-L A2C: 22 3 ml  LAESV A-L A4C: 39 5 ml  LAESV Index (A-L): 19 7 ml/m2  LAESV MOD A2C: 20 4 ml  LAESV MOD A4C: 31 4 ml  LAESV(A-L): 31 1 ml  LAESV(MOD BP): 26 4 ml  LAESVInd MOD BP: 16 7 ml/m2  LALs A2C: 4 5 cm  LALs A4C: 4 1 cm  LVEDV MOD A4C: 53 7 ml  LVEF MOD A4C: 62 6 %  LVESV MOD A4C: 20 1 ml  LVIDd: 3 2 cm  LVIDs: 2 cm  LVLd A4C: 7 1 cm  LVLs A4C: 5 8 cm  LVOT Diam: 2 4 cm  LVPWd: 0 9 cm  RA Major: 5 3 cm  RA Minor: 3 cm  RV Major: 7 2 cm  RV Minor: 1 8 cm  RV base: 2 5 cm  RVIDd: 2 5 cm  SV MOD A4C: 33 6 ml  SV(Teich): 29 ml    CW  AR Dec Gallia: 1 9 m/s2  AR Dec Time: 1931 8 ms  AR PHT: 560 2 ms  AR Vmax: 3 7 m/s  AR maxP mmHg  MV VTI: 18 3 cm  MV Vmax: 1 1 m/s  MV Vmean: 0 5 m/s  MV maxP 8 mmHg  MV meanP 2 mmHg  PRend P mmHg  PRend Vmax: 1 1 m/s  TR Vmax: 2 3 m/s  TR maxP 9 mmHg    MM  TAPSE: 3 4 cm    PW  E' Av 1 m/s  E' Lat: 0 1 m/s  E' Sept: 0 1 m/s  E/E' Av 3  E/E' Lat: 8 3  E/E' Sept: 6 5  LVOT Env  Ti: 284 5 ms  LVOT VTI: 16 2 cm  LVOT Vmax: 0 8 m/s  LVOT Vmean: 0 6 m/s  LVOT maxP 9 mmHg  LVOT meanP 5 mmHg  LVSI Dopp: 47 6 ml/m2  LVSV Dopp: 75 2 ml  MV A Yaya: 0 8 m/s  MV Dec Gallia: 1 7 m/s2  MV DecT: 295 9 ms  MV E Yaya: 0 4 m/s  MV E/A Ratio: 0 5  MVA (VTI): 4 1 cm2  TV E' lat: 0 1 m/s    [employer] Accredited Echocardiography Laboratory    Prepared and electronically signed by    ACE Beckman  Signed 16-Mar-2021 10:34:57    No results found for this or any previous visit  No results found for this or any previous visit  Results for orders placed during the hospital encounter of 21    NM myocardial perfusion spect (rx stress and/or rest)    Narrative  [address] [place] [address], [address]  [phone]    Rest/Stress Gated SPECT Myocardial Perfusion Imaging After Regadenoson    Patient: Sukhdeep Mcneal  MR number: [id number]  Account number: [de-identified]  : 1940  Age: 78 years  Gender: Female  Status: Outpatient  Location: Stress lab  Height: 65 in  Weight: 122 lb  BP: 178/ 102 mmHg    Allergies: ALBUTEROL, METHYLPREDNISOLONE    Diagnosis: R06 00 - Dyspnea, unspecified, R42  - Dizziness and giddiness    Primary Physician:  Levin Leventhal, DO  Technician:  Caitlin Mccormack  RN:  Tatianna Rader RN BSN  Referring Physician:  CARMELA Miller  Group:  [facility]  CC:  Wally Kumari MD  Report Prepared By[de-identified]  Tatianna Rader RN BSN  Interpreting Physician:  Wally Kumari MD    INDICATIONS: Evaluation of known coronary artery disease  HISTORY: The patient is a 78year old  female  Chest pain status: no chest pain  Other symptoms: dyspnea of recent onset, decreased effort tolerance, and stress related pre-syncope  Coronary artery disease risk factors:  dyslipidemia, hypertension,former smoking, and post-menopausal state  Cardiovascular history: arrhythmia( PAF,PVc's)  Prior cardiovascular procedures: coronary angiogram (on 2015)  Medications: a beta blocker, digoxin, a lipid lowering  agent, Eliquis, and thyroid medications      PHYSICAL EXAM: Baseline physical exam screening: normal lung exam     REST ECG: Sinus rhythm with downsloping ST depressions in inferior and anterior and lateral leads  Possible prior anterior and septal infarction, no chamber enlargement or hypertrophy  Low QRS voltages  PROCEDURE: The study was performed in the the Stress lab  A regadenoson infusion pharmacologic stress test was performed  Gated SPECT myocardial perfusion imaging was performed after stress and at rest  Systolic blood pressure was 178  mmHg, at the start of the study  Diastolic blood pressure was 102 mmHg, at the start of the study  The heart rate was 82 bpm, at the start of the study  Regadenoson protocol:  HR bpm SBP mmHg DBP mmHg Symptoms  Baseline 82 178 102 none  Immediate 96 144 84 mild dyspnea  3 min 91 146 78 subsiding  5 min 89 152 84 none  No medications or fluids given  STRESS SUMMARY: Duration of pharmacologic stress was 3 min  Maximal heart rate during stress was 96 bpm  The rate-pressure product for the peak heart rate and blood pressure was 87643  There was no chest pain during stress  The stress test  was terminated due to protocol completion  Pre oxygen saturation: 99 %  Peak oxygen saturation: 100 %  The stress ECG was negative for ischemia and normal  No new abnormal ST T wave changes were noted with regadenoson injection  There was  persistence of diffuse downsloping ST depressions in inferior and anterolateral leads  There were no stress arrhythmias or conduction abnormalities  ISOTOPE ADMINISTRATION:  Resting isotope administration Stress isotope administration  Agent Tetrofosmin Tetrofosmin  Dose 10 5 mCi 31 2 mCi  Date 06/24/2021 06/24/2021  Injection time 09:15 10:34  Imaging time 09:56 11:22  Injection-image interval 41 min 48 min    The radiopharmaceutical was injected at the peak effect of pharmacologic stress  MYOCARDIAL PERFUSION IMAGING:  The image quality was good  Rotating projection images reveal moderate subdiaphragmatic activity   Left ventricular size was normal  The TID ratio was 0 94     PERFUSION DEFECTS:  -  There were no perfusion defects  GATED SPECT:  The calculated left ventricular ejection fraction was 73 %  Left ventricular ejection fraction was within normal limits by visual estimate  There was no left ventricular regional abnormality  SUMMARY:  -  Stress results: There was no chest pain during stress  -  ECG conclusions: The stress ECG was negative for ischemia and normal  No new abnormal ST T wave changes were noted with regadenoson injection  There was persistence of diffuse downsloping ST depressions in inferior and anterolateral  leads   -  Perfusion imaging: There were no perfusion defects   -  Gated SPECT: The calculated left ventricular ejection fraction was 73 %  Left ventricular ejection fraction was within normal limits by visual estimate  There was no left ventricular regional abnormality  IMPRESSIONS: 1  Negative regadenoson nuclear stress test for symptoms of angina pectoris and negative for ECG changes of ischemia  2  Normal myocardial perfusion scan with no definite evidence of reversible or fixed perfusion abnormality  3  Normal left ventricular size and systolic function and regional wall motion  Determined ejection fraction was 73%  4  Markedly elevated blood pressure at beginning of the test which improved to mildly increased towards the end of the test   5  Nonspecific resting ECG abnormalities with no significant changes and no significant arrhythmia noted during stress test  Myocardial perfusion imaging was normal at rest and with stress  Prepared and signed by    Loy Cruz MD  Signed 06/24/2021 15:20:03      CXR: Results for orders placed during the hospital encounter of 03/15/21    XR chest 2 views    Narrative  CHEST    INDICATION:   SOB     COMPARISON:  Chest radiograph from 3/14/2014  EXAM PERFORMED/VIEWS:  XR CHEST PA & LATERAL  DUAL ENERGY SUBTRACTION      FINDINGS:    Cardiomediastinal silhouette normal  Loop recorder in the left chest wall  Lungs clear  No effusion or pneumothorax  Small nodule over the right upper lung is an EKG patch  Osseous structures normal for age  Impression  No acute cardiopulmonary disease  Workstation performed: [id number]    Results for orders placed during the hospital encounter of 08/13/21    XR chest 1 view portable    Narrative  CHEST    INDICATION:   Palpitations  COMPARISON:  3/15/2021    EXAM PERFORMED/VIEWS:  XR CHEST PORTABLE  Single view    FINDINGS:    Cardiomediastinal silhouette appears unremarkable  The lungs are clear  No pneumothorax or pleural effusion  Osseous structures appear within normal limits for patient age  Old lateral right rib healed fracture deformity    Impression  Air trapping consistent with COPD    No acute cardiopulmonary disease  Findings are stable        Workstation performed: [id number]      ECG:  Sinus rhythm low voltage, poor R-wave progression    This note was completed in part utilizing M-Modal Fluency Direct Software  Grammatical errors, random word insertions, spelling mistakes, and incomplete sentences may be an occasional consequence of this system secondary to software limitations, ambient noise, and hardware issues  If you have any questions or concerns about the content, text, or information contained within the body of this dictation, please contact the provider for clarification

## 2021-08-13 NOTE — ASSESSMENT & PLAN NOTE
UA was significant for bacteria  Patient reports feeling dizzy, lightheaded  Start patient on IV Rocephin  Urine cultures pending

## 2021-08-13 NOTE — ASSESSMENT & PLAN NOTE
57-year-old female with past medical history of proximal AFib, orthostatic hypotension, history of cancer status post head and neck radiation, dysphagia status post PEG tube presented with palpitations  On route via EMS, telemetry strip showed that patient was in atrial fibrillation with heart rates in the 120s  Arrival in the ED, EKG shows sinus rhythm and patient's symptoms had resolved  [address] [facility] cardiology outpatient, unable to tolerate beta-blockers due to hypotension  Per previous Cardiology note, Dr Savanna Arredondo was considering anti arrhythmics  Continue digoxin every other day  [address] [facility] cardiology consultation  Monitor on telemetry

## 2021-08-13 NOTE — ASSESSMENT & PLAN NOTE
PEG tube in place  Nutrition consult, appreciate recommendations for diet  Patient reports able to drink water by mouth

## 2021-08-13 NOTE — PLAN OF CARE
Problem: Potential for Falls  Goal: Patient will remain free of falls  Description: INTERVENTIONS:  - Educate patient/family on patient safety including physical limitations  - Instruct patient to call for assistance with activity   - Consult OT/PT to assist with strengthening/mobility   - Keep Call bell within reach  - Keep bed low and locked with side rails adjusted as appropriate  - Keep care items and personal belongings within reach  - Initiate and maintain comfort rounds  - Make Fall Risk Sign visible to staff  - Offer Toileting every  Hours, in advance of need  - Initiate/Maintain alarm  - Obtain necessary fall risk management equipment:   - Apply yellow socks and bracelet for high fall risk patients  - Consider moving patient to room near nurses station  Outcome: Progressing     Problem: Nutrition/Hydration-ADULT  Goal: Nutrient/Hydration intake appropriate for improving, restoring or maintaining nutritional needs  Description: Monitor and assess patient's nutrition/hydration status for malnutrition  Collaborate with interdisciplinary team and initiate plan and interventions as ordered  Monitor patient's weight and dietary intake as ordered or per policy  Utilize nutrition screening tool and intervene as necessary  Determine patient's food preferences and provide high-protein, high-caloric foods as appropriate       INTERVENTIONS:  - Monitor oral intake, urinary output, labs, and treatment plans  - Assess nutrition and hydration status and recommend course of action  - Evaluate amount of meals eaten  - Assist patient with eating if necessary   - Allow adequate time for meals  - Recommend/ encourage appropriate diets, oral nutritional supplements, and vitamin/mineral supplements  - Order, calculate, and assess calorie counts as needed  - Recommend, monitor, and adjust tube feedings and TPN/PPN based on assessed needs  - Assess need for intravenous fluids  - Provide specific nutrition/hydration education as appropriate  - Include patient/family/caregiver in decisions related to nutrition  Outcome: Progressing     Problem: PAIN - ADULT  Goal: Verbalizes/displays adequate comfort level or baseline comfort level  Description: Interventions:  - Encourage patient to monitor pain and request assistance  - Assess pain using appropriate pain scale  - Administer analgesics based on type and severity of pain and evaluate response  - Implement non-pharmacological measures as appropriate and evaluate response  - Consider cultural and social influences on pain and pain management  - Notify physician/advanced practitioner if interventions unsuccessful or patient reports new pain  Outcome: Progressing     Problem: INFECTION - ADULT  Goal: Absence or prevention of progression during hospitalization  Description: INTERVENTIONS:  - Assess and monitor for signs and symptoms of infection  - Monitor lab/diagnostic results  - Monitor all insertion sites, i e  indwelling lines, tubes, and drains  - Monitor endotracheal if appropriate and nasal secretions for changes in amount and color  - [place] appropriate cooling/warming therapies per order  - Administer medications as ordered  - Instruct and encourage patient and family to use good hand hygiene technique  - Identify and instruct in appropriate isolation precautions for identified infection/condition  Outcome: Progressing  Goal: Absence of fever/infection during neutropenic period  Description: INTERVENTIONS:  - Monitor WBC    Outcome: Progressing     Problem: SAFETY ADULT  Goal: Maintain or return to baseline ADL function  Description: INTERVENTIONS:  -  Assess patient's ability to carry out ADLs; assess patient's baseline for ADL function and identify physical deficits which impact ability to perform ADLs (bathing, care of mouth/teeth, toileting, grooming, dressing, etc )  - Assess/evaluate cause of self-care deficits   - Assess range of motion  - Assess patient's mobility; develop plan if impaired  - Assess patient's need for assistive devices and provide as appropriate  - Encourage maximum independence but intervene and supervise when necessary  - Involve family in performance of ADLs  - Assess for home care needs following discharge   - Consider OT consult to assist with ADL evaluation and planning for discharge  - Provide patient education as appropriate  Outcome: Progressing  Goal: Maintains/Returns to pre admission functional level  Description: INTERVENTIONS:  - Perform BMAT or MOVE assessment daily    - Set and communicate daily mobility goal to care team and patient/family/caregiver  - Collaborate with rehabilitation services on mobility goals if consulted  - Perform Range of Motion  times a day  - Reposition patient every  hours    - Dangle patient  times a day  - Stand patient  times a day  - Ambulate patient  times a day  - Out of bed to chair  times a day   - Out of bed for meals times a day  - Out of bed for toileting  - Record patient progress and toleration of activity level   Outcome: Progressing     Problem: DISCHARGE PLANNING  Goal: Discharge to home or other facility with appropriate resources  Description: INTERVENTIONS:  - Identify barriers to discharge w/patient and caregiver  - Arrange for needed discharge resources and transportation as appropriate  - Identify discharge learning needs (meds, wound care, etc )  - Arrange for interpretive services to assist at discharge as needed  - Refer to Case Management Department for coordinating discharge planning if the patient needs post-hospital services based on physician/advanced practitioner order or complex needs related to functional status, cognitive ability, or social support system  Outcome: Progressing     Problem: Knowledge Deficit  Goal: Patient/family/caregiver demonstrates understanding of disease process, treatment plan, medications, and discharge instructions  Description: Complete learning assessment and assess knowledge base    Interventions:  - Provide teaching at level of understanding  - Provide teaching via preferred learning methods  Outcome: Progressing     Problem: CARDIOVASCULAR - ADULT  Goal: Maintains optimal cardiac output and hemodynamic stability  Description: INTERVENTIONS:  - Monitor I/O, vital signs and rhythm  - Monitor for S/S and trends of decreased cardiac output  - Administer and titrate ordered vasoactive medications to optimize hemodynamic stability  - Assess quality of pulses, skin color and temperature  - Assess for signs of decreased coronary artery perfusion  - Instruct patient to report change in severity of symptoms  Outcome: Progressing  Goal: Absence of cardiac dysrhythmias or at baseline rhythm  Description: INTERVENTIONS:  - Continuous cardiac monitoring, vital signs, obtain 12 lead EKG if ordered  - Administer antiarrhythmic and heart rate control medications as ordered  - Monitor electrolytes and administer replacement therapy as ordered  Outcome: Progressing

## 2021-08-13 NOTE — ED PROVIDER NOTES
History  Chief Complaint   Patient presents with    Dizziness     Pt reporst waking up with dizziness  Per EMS after walking patient to ambulance and was put on monitor pt's HR was Afib 036N and BP systolic in 10J  Pt has history of Afib  On arrival pt's vitals are within normal limits and she reports no dizziness  Pt denies CP or SOB  History provided by:  Patient   used: No    Dizziness  Quality:  Lightheadedness  Severity:  Moderate  Onset quality:  Sudden  Duration:  1 hour  Timing:  Sporadic  Progression:  Resolved  Chronicity:  New  Context comment:  Hx of Paroxysmal Afib  Relieved by:  Nothing  Worsened by:  Nothing  Ineffective treatments:  None tried  Associated symptoms: palpitations    Associated symptoms: no blood in stool, no chest pain, no diarrhea, no headaches, no nausea, no shortness of breath, no syncope, no vomiting and no weakness    Risk factors comment:  P-Afib      Prior to Admission Medications   Prescriptions Last Dose Informant Patient Reported? Taking?    Ascorbic Acid (vitamin C) 100 MG tablet 8/12/2021 at Unknown time Self Yes Yes   Sig: Take 3,000 mg by mouth daily    apixaban (ELIQUIS) 2 5 mg 8/12/2021 at Unknown time Self No Yes   Sig: Take 1 tablet (2 5 mg total) by mouth 2 (two) times a day   atorvastatin (LIPITOR) 20 mg tablet 8/12/2021 at Unknown time Self Yes Yes   Sig: atorvastatin 20 mg tablet   cholecalciferol (VITAMIN D3) 400 units tablet 8/12/2021 at Unknown time Self Yes Yes   Sig: Take 400 Units by mouth daily    digoxin (LANOXIN) 0 125 mg tablet 8/12/2021 at Unknown time Self No Yes   Sig: Take 1 tablet (125 mcg total) by mouth every other day   levothyroxine 112 mcg tablet 8/12/2021 at Unknown time Self Yes Yes   Sig: Take 112 mcg by mouth daily 100mg daily   midodrine (PROAMATINE) 2 5 mg tablet 8/12/2021 at Unknown time  No Yes   Sig: Take 1 tablet (2 5 mg total) by mouth 3 (three) times a day 3 times daily during daytime at least 6 hours apart  Do not take if blood pressure greater is greater than 130/80  Facility-Administered Medications: None       Past Medical History:   Diagnosis Date    Abnormal ECG     Arrhythmia     Asthma     Atrial fibrillation (HCC)     Autonomic dysfunction     Cancer (HCC)     lung, head and neck    Coronary artery disease     Disease of thyroid gland     Dysphagia     Gastroparesis     Heart murmur     mitral and aortic regurg    Hyperlipidemia     Hypertension     Irregular heart beat        Past Surgical History:   Procedure Laterality Date    CARDIAC CATHETERIZATION      CARDIAC LOOP RECORDER      ESOPHAGOGASTRODUODENOSCOPY W/ PEG      HEAD AND NECK DEBRIDEMENT      LUNG SURGERY Left     upper lobectomy    PEG TUBE PLACEMENT  01/15/2021    By Dr Danielle Fay  2017    PEG TUBE PLACEMENT  06/2020    Balloon gastrostomy-replaced 3 more times secondary to balloon failure and Balloon falling out       Family History   Problem Relation Age of Onset    Lung cancer Sister      I have reviewed and agree with the history as documented  E-Cigarette/Vaping    E-Cigarette Use Never User     Quit Date 9/17/1985      E-Cigarette/Vaping Substances    Nicotine No     THC No     CBD No     Flavoring No     Other No     Unknown No      Social History     Tobacco Use    Smoking status: Former Smoker     Packs/day: 0 50     Years: 20 00     Pack years: 10 00    Smokeless tobacco: Never Used    Tobacco comment: Quit at age 41   Vaping Use    Vaping Use: Never used   Substance Use Topics    Alcohol use: Not Currently    Drug use: Never       Review of Systems   Constitutional: Negative for chills and fever  HENT: Negative for facial swelling, sore throat and trouble swallowing  Eyes: Negative for pain and visual disturbance  Respiratory: Negative for cough and shortness of breath  Cardiovascular: Positive for palpitations   Negative for chest pain, leg swelling and syncope  Gastrointestinal: Negative for abdominal pain, blood in stool, diarrhea, nausea and vomiting  Genitourinary: Negative for dysuria and flank pain  Musculoskeletal: Negative for back pain, neck pain and neck stiffness  Skin: Negative for pallor and rash  Allergic/Immunologic: Negative for environmental allergies and immunocompromised state  Neurological: Positive for dizziness  Negative for weakness and headaches  Hematological: Negative for adenopathy  Does not bruise/bleed easily  Psychiatric/Behavioral: Negative for agitation and behavioral problems  All other systems reviewed and are negative  Physical Exam  Physical Exam  Vitals and nursing note reviewed  Constitutional:       General: She is not in acute distress  Appearance: She is well-developed  HENT:      Head: Normocephalic and atraumatic  Cardiovascular:      Rate and Rhythm: Normal rate and regular rhythm  Heart sounds: Normal heart sounds  Pulmonary:      Effort: Pulmonary effort is normal  No respiratory distress  Abdominal:      General: Bowel sounds are normal       Palpations: Abdomen is soft  Tenderness: There is no abdominal tenderness  There is no guarding or rebound  Musculoskeletal:         General: Normal range of motion  Cervical back: Normal range of motion and neck supple  Skin:     General: Skin is warm and dry  Neurological:      General: No focal deficit present  Mental Status: She is alert and oriented to person, place, and time     Psychiatric:         Mood and Affect: Mood normal          Behavior: Behavior normal          Vital Signs  ED Triage Vitals   Temperature Pulse Respirations Blood Pressure SpO2   08/13/21 0943 08/13/21 0943 08/13/21 0943 08/13/21 0943 08/13/21 0943   97 5 °F (36 4 °C) 79 16 111/64 97 %      Temp Source Heart Rate Source Patient Position - Orthostatic VS BP Location FiO2 (%)   08/13/21 2714 08/13/21 0943 08/13/21 0943 08/13/21 0943 -- Oral Monitor Sitting Left arm       Pain Score       08/13/21 1322       5           Vitals:    08/13/21 1134 08/13/21 1322 08/13/21 1346 08/13/21 1524   BP: 105/59 126/58 114/70 110/73   Pulse: 79 73 74 77   Patient Position - Orthostatic VS: Standing Sitting Sitting Lying         Visual Acuity      ED Medications  Medications   ceftriaxone (ROCEPHIN) 1 g/50 mL in dextrose IVPB (1,000 mg Intravenous New Bag 8/13/21 1326)   apixaban (ELIQUIS) tablet 2 5 mg (2 5 mg Oral Given 8/13/21 1521)   atorvastatin (LIPITOR) tablet 20 mg (has no administration in time range)   levothyroxine tablet 112 mcg (112 mcg Oral Not Given 8/13/21 1519)   digoxin (LANOXIN) tablet 125 mcg (125 mcg Oral Given 8/13/21 1521)   midodrine (PROAMATINE) tablet 2 5 mg (2 5 mg Oral Given 8/13/21 1521)   acetaminophen (TYLENOL) tablet 650 mg (has no administration in time range)   ondansetron (ZOFRAN) injection 4 mg (has no administration in time range)       Diagnostic Studies  Results Reviewed     Procedure Component Value Units Date/Time    Urine Microscopic [[id number]]  (Abnormal) Collected: 08/13/21 1203    Lab Status: Final result Specimen: Urine, Clean Catch Updated: 08/13/21 1254     RBC, UA None Seen /hpf      WBC, UA Innumerable /hpf      Epithelial Cells Occasional /hpf      Bacteria, UA Innumerable /hpf     Urine culture [[id number]] Collected: 08/13/21 1203    Lab Status:  In process Specimen: Urine, Clean Catch Updated: 08/13/21 1253    UA w Reflex to Microscopic w Reflex to Culture [[id number]]  (Abnormal) Collected: 08/13/21 1203    Lab Status: Final result Specimen: Urine, Clean Catch Updated: 08/13/21 1226     Color, UA Yellow     Clarity, UA Cloudy     Specific [place], UA 1 010     pH, UA 6 5     Leukocytes, UA Large     Nitrite, UA Positive     Protein, UA Trace mg/dl      Glucose, UA Negative mg/dl      Ketones, UA Negative mg/dl      Urobilinogen, UA 0 2 E U /dl      Bilirubin, UA Negative     Blood, UA Small    TSH [[id number]] (Normal) Collected: 08/13/21 1005    Lab Status: Final result Specimen: Blood from Arm, Right Updated: 08/13/21 1047     TSH 3RD GENERATON 0 917 uIU/mL     Narrative:      Patients undergoing fluorescein dye angiography may retain small amounts of fluorescein in the body for 48-72 hours post procedure  Samples containing fluorescein can produce falsely depressed TSH values  If the patient had this procedure,a specimen should be resubmitted post fluorescein clearance        Magnesium [[id number]]  (Normal) Collected: 08/13/21 1005    Lab Status: Final result Specimen: Blood from Arm, Right Updated: 08/13/21 1047     Magnesium 2 5 mg/dL     NT-BNP PRO [[id number]]  (Abnormal) Collected: 08/13/21 1005    Lab Status: Final result Specimen: Blood from Arm, Right Updated: 08/13/21 1047     NT-proBNP 1,119 pg/mL     Digoxin level [[id number]]  (Normal) Collected: 08/13/21 1005    Lab Status: Final result Specimen: Blood from Arm, Right Updated: 08/13/21 1047     Digoxin Lvl 0 9 ng/mL     Comprehensive metabolic panel [[id number]]  (Abnormal) Collected: 08/13/21 1005    Lab Status: Final result Specimen: Blood from Arm, Right Updated: 08/13/21 1035     Sodium 140 mmol/L      Potassium 4 2 mmol/L      Chloride 102 mmol/L      CO2 27 mmol/L      ANION GAP 11 mmol/L      BUN 32 mg/dL      Creatinine 0 84 mg/dL      Glucose 97 mg/dL      Calcium 9 6 mg/dL      AST 28 U/L      ALT 6 U/L      Alkaline Phosphatase 107 U/L      Total Protein 7 3 g/dL      Albumin 3 7 g/dL      Total Bilirubin 0 43 mg/dL      eGFR 64 ml/min/1 73sq m     Narrative:      Melanie guidelines for Chronic Kidney Disease (CKD):     Stage 1 with normal or high GFR (GFR > 90 mL/min/1 73 square meters)    Stage 2 Mild CKD (GFR = 60-89 mL/min/1 73 square meters)    Stage 3A Moderate CKD (GFR = 45-59 mL/min/1 73 square meters)    Stage 3B Moderate CKD (GFR = 30-44 mL/min/1 73 square meters)    Stage 4 Severe CKD (GFR = 15-29 mL/min/1 73 square meters)    Stage 5 End Stage CKD (GFR <15 mL/min/1 73 square meters)  Note: GFR calculation is accurate only with a steady state creatinine    Protime-INR [[id number]]  (Abnormal) Collected: 08/13/21 1005    Lab Status: Final result Specimen: Blood from Arm, Right Updated: 08/13/21 1034     Protime 15 0 seconds      INR 1 21    APTT [[id number]]  (Normal) Collected: 08/13/21 1005    Lab Status: Final result Specimen: Blood from Arm, Right Updated: 08/13/21 1034     PTT 31 seconds     Troponin I [[id number]]  (Normal) Collected: 08/13/21 1005    Lab Status: Final result Specimen: Blood from Arm, Right Updated: 08/13/21 1031     Troponin I <0 02 ng/mL     CBC and differential [[id number]]  (Abnormal) Collected: 08/13/21 1005    Lab Status: Final result Specimen: Blood from Arm, Right Updated: 08/13/21 1013     WBC 8 85 Thousand/uL      RBC 3 85 Million/uL      Hemoglobin 12 5 g/dL      Hematocrit 39 3 %       fL      MCH 32 5 pg      MCHC 31 8 g/dL      RDW 12 7 %      MPV 10 2 fL      Platelets 862 Thousands/uL      nRBC 0 /100 WBCs      Neutrophils Relative 71 %      Immat GRANS % 0 %      Lymphocytes Relative 15 %      Monocytes Relative 10 %      Eosinophils Relative 3 %      Basophils Relative 1 %      Neutrophils Absolute 6 36 Thousands/µL      Immature Grans Absolute 0 02 Thousand/uL      Lymphocytes Absolute 1 32 Thousands/µL      Monocytes Absolute 0 87 Thousand/µL      Eosinophils Absolute 0 23 Thousand/µL      Basophils Absolute 0 05 Thousands/µL                  XR chest 1 view portable   Final Result by Chacho Jones MD (08/13 1109)   Air trapping consistent with COPD      No acute cardiopulmonary disease        Findings are stable            Workstation performed: [id number]                    Procedures  ECG 12 Lead Documentation Only    Date/Time: 8/13/2021 10:19 AM  Performed by: Andrzej Breaux MD  Authorized by: Andrzej Breaux MD     Indications / Diagnosis: Dizziness  ECG reviewed by me, the ED Provider: yes    Patient location:  ED  Interpretation:     Interpretation: normal    Rate:     ECG rate assessment: normal    Rhythm:     Rhythm: sinus rhythm    Ectopy:     Ectopy: none    QRS:     QRS axis:  Normal  Conduction:     Conduction: normal    ST segments:     ST segments:  Normal  T waves:     T waves: normal               ED Course  ED Course as of Aug 13 1609   Fri Aug 13, 2021   1036 WBC: 8 85   1036 Hemoglobin: 12 5   1036 Platelet Count: 532   1036 Sodium: 140   1036 Potassium: 4 2   1036 BUN(!): 32   1036 Creatinine: 0 84   1036 Labs reviewed  Troponin I: <0 02   1236 Leukocytes, UA(!): Large   1236 Urine noted for large leucs, nitrites, we will give dose of Keflex  Nitrite, UA(!): Positive   1240 Case discussed with RAMSEY Chen via [address] [place] [address], admit under Obs, advised Cardiology Consult  MDM  Number of Diagnoses or Management Options  Dizziness: new and requires workup  Palpitations: new and requires workup  Paroxysmal atrial fibrillation ([address] ): new and requires workup  UTI (urinary tract infection): new and requires workup  Diagnosis management comments: Patient is an 25-year-old female, history of paroxysmal atrial fibrillation, on Eliquis, digoxin, comes in with complaints of dizziness, lives by herself at home, states she woke up around 8:00 a m , feeling dizzy, palpitations, felt like passing out, checked her heart rate to be in 962B, systolic blood pressure in 70s, denies chest pain, fever, cough or any recent illness  Patient recovered prior to arrival with improvement of symptoms and vital signs  On exam, patient is conscious, alert, vital signs stable, no acute respiratory distress, pulses equal bilaterally, no peripheral edema no calf tenderness swelling, no focal neuro deficits    Impression:  Paroxysmal AFib, dizziness, rule out ACS/MI, electrolyte imbalance, will check labs, urine, EKG, chest x-ray  Amount and/or Complexity of Data Reviewed  Clinical lab tests: reviewed and ordered  Tests in the radiology section of CPT®: ordered and reviewed  Tests in the medicine section of CPT®: ordered and reviewed  Discuss the patient with other providers: yes  Independent visualization of images, tracings, or specimens: yes        Disposition  Final diagnoses:   Dizziness   Palpitations   UTI (urinary tract infection)   Paroxysmal atrial fibrillation ([address] )     Time reflects when diagnosis was documented in both MDM as applicable and the Disposition within this note     Time User Action Codes Description Comment    8/13/2021 10:20 AM Peterson Leslie Add [R42] Dizziness     8/13/2021 10:20 AM Peterson Leslie Add [R00 2] Palpitations     8/13/2021 12:38 PM Peterson Leslie Add [N39 0] UTI (urinary tract infection)     8/13/2021 12:40 PM Peterson Leslie Add [I48 0] Paroxysmal atrial fibrillation ([address] )     8/13/2021  1:12 PM Giovanna Ramos Add [Z92 3] History of radiation to head and neck region       ED Disposition     ED Disposition Condition Date/Time Comment    Admit Stable Fri Aug 13, 2021 12:39 PM Case was discussed with Dr Ezequiel Rodriguez and the patient's admission status was agreed to be Admission Status: observation status to the service of Dr Ezequiel Rodriguez          Follow-up Information    None         Current Discharge Medication List      CONTINUE these medications which have NOT CHANGED    Details   apixaban (ELIQUIS) 2 5 mg Take 1 tablet (2 5 mg total) by mouth 2 (two) times a day  Qty: 180 tablet, Refills: 0    Associated Diagnoses: PAF (paroxysmal atrial fibrillation) ([facility])      Ascorbic Acid (vitamin C) 100 MG tablet Take 3,000 mg by mouth daily       atorvastatin (LIPITOR) 20 mg tablet atorvastatin 20 mg tablet      cholecalciferol (VITAMIN D3) 400 units tablet Take 400 Units by mouth daily       digoxin (LANOXIN) 0 125 mg tablet Take 1 tablet (125 mcg total) by mouth every other day  Qty: 45 tablet, Refills: 3    Associated Diagnoses: PAF (paroxysmal atrial fibrillation) (HCC)      levothyroxine 112 mcg tablet Take 112 mcg by mouth daily 100mg daily      midodrine (PROAMATINE) 2 5 mg tablet Take 1 tablet (2 5 mg total) by mouth 3 (three) times a day 3 times daily during daytime at least 6 hours apart  Do not take if blood pressure greater is greater than 130/80  Qty: 90 tablet, Refills: 1    Associated Diagnoses: Orthostatic hypotension           No discharge procedures on file      PDMP Review     None          ED Provider  Electronically Signed by           Flakita Velasquez MD  08/13/21 7716

## 2021-08-13 NOTE — H&P
Kyra [address] 1940, 78 y o  female MRN: [id number]  Unit/Bed#: ED 20 Encounter: [id number]  Primary Care Provider: Todd Gomez DO   Date and time admitted to hospital: 8/13/2021  9:37 AM    * Palpitations  Assessment & Plan  78-year-old female with past medical history of proximal AFib, orthostatic hypotension, history of cancer status post head and neck radiation, dysphagia status post PEG tube presented with palpitations  On route via EMS, telemetry strip showed that patient was in atrial fibrillation with heart rates in the 120s  Arrival in the ED, EKG shows sinus rhythm and patient's symptoms had resolved  [facility] cardiology outpatient, unable to tolerate beta-blockers due to hypotension  Per previous Cardiology note, Dr Alejo Kennedy was considering anti arrhythmics  Continue digoxin every other day  Continue Eliquis  Appreciate cardiology consultation  Monitor on telemetry    UTI (urinary tract infection)  Assessment & Plan  UA was significant for bacteria  Patient reports feeling dizzy, lightheaded  Start patient on IV Rocephin  Urine cultures pending    S/P percutaneous endoscopic gastrostomy (PEG) tube placement ([address] )  Assessment & Plan  PEG tube in place  Nutrition consult, appreciate recommendations for diet  Patient reports able to drink water by mouth    Orthostatic hypotension  Assessment & Plan  Continue midodrine 2 5 t i d     Paroxysmal atrial fibrillation ([address] )  Assessment & Plan  As above      VTE Prophylaxis: Apixaban (Eliquis)  / sequential compression device   Code Status: DNR/DNI  POLST: There is no POLST form on file for this patient (pre-hospital)  Discussion with family: Patient    Anticipated Length of Stay:  Patient will be admitted on an Observation basis with an anticipated length of stay of 1 midnights     Justification for Hospital Stay: Cardiology Evaluation    Total Time for Visit, including Counseling / Coordination of Care: 45 minutes  Greater than 50% of this total time spent on direct patient counseling and coordination of care  Chief Complaint:   Dizziness, Palpitations    History of Present Illness:    Milagros Hunter is a 78 y o  female who presents with dizziness and palpitations  Past medical history of proximal AFib, orthostatic hypotension, autonomic dysfunction, head and neck radiation status post PEG tube and hypothyroidism  Patient called EMS after waking up feeling dizzy and lightheaded  She checked her heart rate and found that her heart rate is in the 120s  EMS arrived and repeated telemetry with strip showing that patient was AFib with PVCs noted  Her symptoms resolved upon presentation to the ED  Denies any chest pain, shortness of breath, nausea, vomiting  In the ED her UA was significant for WBCs, nitrates and leukocytes  Patient has history of multiple UTIs and has been on antibiotics previously  Last UA was done in May which was normal     Review of Systems:    Review of Systems   Constitutional: Negative for activity change, appetite change, chills and fever  HENT: Negative for congestion, facial swelling, sinus pain and sore throat  Respiratory: Negative for cough, shortness of breath and wheezing  Cardiovascular: Positive for palpitations  Negative for chest pain and leg swelling  Gastrointestinal: Negative for abdominal distention, abdominal pain, constipation, diarrhea, nausea and vomiting  Endocrine: Negative for cold intolerance, heat intolerance, polydipsia, polyphagia and polyuria  Genitourinary: Negative for dysuria, flank pain and urgency  Skin: Negative for color change and pallor  Neurological: Positive for dizziness and light-headedness  Negative for syncope, weakness and headaches  Psychiatric/Behavioral: Negative for agitation, confusion and dysphoric mood         Past Medical and Surgical History:     Past Medical History:   Diagnosis Date    Abnormal ECG     Arrhythmia     Asthma     Atrial fibrillation (HCC)     Autonomic dysfunction     Cancer (HCC)     lung, head and neck    Coronary artery disease     Disease of thyroid gland     Dysphagia     Gastroparesis     Heart murmur     mitral and aortic regurg    Hyperlipidemia     Hypertension     Irregular heart beat        Past Surgical History:   Procedure Laterality Date    CARDIAC CATHETERIZATION      CARDIAC LOOP RECORDER      ESOPHAGOGASTRODUODENOSCOPY W/ PEG      HEAD AND NECK DEBRIDEMENT      LUNG SURGERY Left     upper lobectomy    PEG TUBE PLACEMENT  01/15/2021    By Dr Papi Muñoz  2017    PEG TUBE PLACEMENT  06/2020    Balloon gastrostomy-replaced 3 more times secondary to balloon failure and Balloon falling out       Meds/Allergies:    Prior to Admission medications    Medication Sig Start Date End Date Taking? Authorizing Provider   apixaban (ELIQUIS) 2 5 mg Take 1 tablet (2 5 mg total) by mouth 2 (two) times a day 7/19/21   Brian Barr MD   Ascorbic Acid (vitamin C) 100 MG tablet Take 3,000 mg by mouth daily     Historical Provider, MD   atorvastatin (LIPITOR) 20 mg tablet atorvastatin 20 mg tablet    Historical Provider, MD   cholecalciferol (VITAMIN D3) 400 units tablet Take 400 Units by mouth daily     Historical Provider, MD   digoxin (LANOXIN) 0 125 mg tablet Take 1 tablet (125 mcg total) by mouth every other day 11/23/20   Brian Barr MD   levothyroxine 112 mcg tablet Take 112 mcg by mouth daily 100mg daily 8/19/20   Historical Provider, MD   midodrine (PROAMATINE) 2 5 mg tablet Take 1 tablet (2 5 mg total) by mouth 3 (three) times a day 3 times daily during daytime at least 6 hours apart    Do not take if blood pressure greater is greater than 130/80  7/30/21   Brian Barr MD   bisoprolol (ZEBETA) 5 mg tablet Take 0 5 tablets (2 5 mg total) by mouth daily  Patient not taking: Reported on 7/30/2021 3/18/21 7/30/21  Brian Barr MD     I have reviewed home medications with patient personally  Allergies: Allergies   Allergen Reactions    Albuterol Other (See Comments)     Other reaction(s): Other (See Comments)  Afib  Causes afib      Methylprednisolone Other (See Comments)     Dizziness  Patient states adverse effect, "i feel weird in the head"       Social History:     Marital Status: Unknown   Occupation: Retired  Patient Pre-hospital Living Situation: Home  Patient Pre-hospital Level of Mobility: Ambulatory  Patient Pre-hospital Diet Restrictions: None  Substance Use History:   Social History     Substance and Sexual Activity   Alcohol Use Not Currently     Social History     Tobacco Use   Smoking Status Former Smoker    Packs/day: 0 50    Years: 20 00    Pack years: 10 00   Smokeless Tobacco Never Used   Tobacco Comment    Quit at age 41     Social History     Substance and Sexual Activity   Drug Use Never       Family History:    Family History   Problem Relation Age of Onset    Lung cancer Sister        Physical Exam:     Vitals:   Blood Pressure: 126/58 (08/13/21 1322)  Pulse: 73 (08/13/21 1322)  Temperature: 97 5 °F (36 4 °C) (08/13/21 0943)  Temp Source: Oral (08/13/21 0943)  Respirations: 17 (08/13/21 1322)  Weight - Scale: 56 kg (123 lb 7 3 oz) (08/13/21 0943)  SpO2: 99 % (08/13/21 1322)    Physical Exam  Vitals and nursing note reviewed  Constitutional:       Appearance: Normal appearance  HENT:      Head: Normocephalic and atraumatic  Eyes:      General: No scleral icterus  Conjunctiva/sclera: Conjunctivae normal    Cardiovascular:      Rate and Rhythm: Normal rate and regular rhythm  Heart sounds: Normal heart sounds  Pulmonary:      Effort: Pulmonary effort is normal       Comments: Decreased breath sounds  Abdominal:      General: Bowel sounds are normal  There is no distension  Palpations: Abdomen is soft  Tenderness: There is no abdominal tenderness        Comments: PEG Tube   Musculoskeletal:         General: No swelling  Right lower leg: No edema  Left lower leg: No edema  Skin:     General: Skin is warm and dry  Neurological:      General: No focal deficit present  Mental Status: She is alert  Mental status is at baseline  Additional Data:     Lab Results: I have personally reviewed pertinent reports  Results from last 7 days   Lab Units 08/13/21  1005   WBC Thousand/uL 8 85   HEMOGLOBIN g/dL 12 5   HEMATOCRIT % 39 3   PLATELETS Thousands/uL 265   NEUTROS PCT % 71   LYMPHS PCT % 15   MONOS PCT % 10   EOS PCT % 3     Results from last 7 days   Lab Units 08/13/21  1005   SODIUM mmol/L 140   POTASSIUM mmol/L 4 2   CHLORIDE mmol/L 102   CO2 mmol/L 27   BUN mg/dL 32*   CREATININE mg/dL 0 84   ANION GAP mmol/L 11   CALCIUM mg/dL 9 6   ALBUMIN g/dL 3 7   TOTAL BILIRUBIN mg/dL 0 43   ALK PHOS U/L 107   ALT U/L 6*   AST U/L 28   GLUCOSE RANDOM mg/dL 97     Results from last 7 days   Lab Units 08/13/21  1005   INR  1 21*                   Imaging: I have personally reviewed pertinent reports  XR chest 1 view portable   Final Result by John Turcios MD (08/13 1109)   Air trapping consistent with COPD      No acute cardiopulmonary disease  Findings are stable            Workstation performed: [id number]             EKG, Pathology, and Other Studies Reviewed on Admission:   · EKG: Sinus Rhythm    Allscripts / Epic Records Reviewed: Yes     ** Please Note: This note has been constructed using a voice recognition system   **

## 2021-08-14 NOTE — ASSESSMENT & PLAN NOTE
UA was significant for bacteria  Urine cultures currently growing Gram-negative rods  Continue Rocephin  Urine cultures pending sensitivities

## 2021-08-14 NOTE — MALNUTRITION/BMI
This medical record reflects one or more clinical indicators suggestive of malnutrition and/or morbid obesity  Malnutrition Findings:   Adult Malnutrition type: Acute illness  Adult Degree of Malnutrition: Malnutrition of moderate degree  Malnutrition Characteristics: Inadequate energy, Weight loss    BMI Findings:  Adult BMI Classifications:  (PCM r/t dysphagia and h/o head/neck cancer AEB; significant wt loss of 4% x 1 week, < 75% energy intake compared to estimated energy needs for > 7 days  Adjustment of TF)     Body mass index is 19 44 kg/m²  See Nutrition note dated 08/14/2021 for additional details  Completed nutrition assessment is viewable in the nutrition documentation

## 2021-08-14 NOTE — CONSULTS
Pt with recent significant wt loss of 5# (4 1%) x 1 week  Pt states that d/t c/o bloating her originally prescribed 4/ 8 oz containers of Two shelley was reduced by pt to 3 containers  Pt also states that some feedings were skipped d/t current condition  Pt with poor understanding of kcal/fluid contents of TF  When suggested to add back one container, pt stated she is "too busy" to add another feeding  Pt seems amenable to increase feeding to original 4 feedings/day while in hospital   Will recommend Twocal HN bolus of 225ml Q 6 hours with flushes of 150ml with each bolus  This will provide 1800kcal/75g protein/1230ml fluid  Will encourage pt to continue to drink fluids  Will monitor pt's po intake/wts and adjust as warranted

## 2021-08-14 NOTE — PLAN OF CARE
Problem: Potential for Falls  Goal: Patient will remain free of falls  Description: INTERVENTIONS:  - Educate patient/family on patient safety including physical limitations  - Instruct patient to call for assistance with activity   - Consult OT/PT to assist with strengthening/mobility   - Keep Call bell within reach  - Keep bed low and locked with side rails adjusted as appropriate  - Keep care items and personal belongings within reach  - Initiate and maintain comfort rounds  - Make Fall Risk Sign visible to staff  - Offer Toileting every  Hours, in advance of need  - Initiate/Maintain alarm  - Obtain necessary fall risk management equipment:   - Apply yellow socks and bracelet for high fall risk patients  - Consider moving patient to room near nurses station  Outcome: Progressing     Problem: Nutrition/Hydration-ADULT  Goal: Nutrient/Hydration intake appropriate for improving, restoring or maintaining nutritional needs  Description: Monitor and assess patient's nutrition/hydration status for malnutrition  Collaborate with interdisciplinary team and initiate plan and interventions as ordered  Monitor patient's weight and dietary intake as ordered or per policy  Utilize nutrition screening tool and intervene as necessary  Determine patient's food preferences and provide high-protein, high-caloric foods as appropriate       INTERVENTIONS:  - Monitor oral intake, urinary output, labs, and treatment plans  - Assess nutrition and hydration status and recommend course of action  - Evaluate amount of meals eaten  - Assist patient with eating if necessary   - Allow adequate time for meals  - Recommend/ encourage appropriate diets, oral nutritional supplements, and vitamin/mineral supplements  - Order, calculate, and assess calorie counts as needed  - Recommend, monitor, and adjust tube feedings and TPN/PPN based on assessed needs  - Assess need for intravenous fluids  - Provide specific nutrition/hydration education as appropriate  - Include patient/family/caregiver in decisions related to nutrition  Outcome: Progressing     Problem: PAIN - ADULT  Goal: Verbalizes/displays adequate comfort level or baseline comfort level  Description: Interventions:  - Encourage patient to monitor pain and request assistance  - Assess pain using appropriate pain scale  - Administer analgesics based on type and severity of pain and evaluate response  - Implement non-pharmacological measures as appropriate and evaluate response  - Consider cultural and social influences on pain and pain management  - Notify physician/advanced practitioner if interventions unsuccessful or patient reports new pain  Outcome: Progressing     Problem: INFECTION - ADULT  Goal: Absence or prevention of progression during hospitalization  Description: INTERVENTIONS:  - Assess and monitor for signs and symptoms of infection  - Monitor lab/diagnostic results  - Monitor all insertion sites, i e  indwelling lines, tubes, and drains  - Monitor endotracheal if appropriate and nasal secretions for changes in amount and color  - Williamson appropriate cooling/warming therapies per order  - Administer medications as ordered  - Instruct and encourage patient and family to use good hand hygiene technique  - Identify and instruct in appropriate isolation precautions for identified infection/condition  Outcome: Progressing  Goal: Absence of fever/infection during neutropenic period  Description: INTERVENTIONS:  - Monitor WBC    Outcome: Progressing     Problem: SAFETY ADULT  Goal: Maintain or return to baseline ADL function  Description: INTERVENTIONS:  -  Assess patient's ability to carry out ADLs; assess patient's baseline for ADL function and identify physical deficits which impact ability to perform ADLs (bathing, care of mouth/teeth, toileting, grooming, dressing, etc )  - Assess/evaluate cause of self-care deficits   - Assess range of motion  - Assess patient's mobility; develop plan if impaired  - Assess patient's need for assistive devices and provide as appropriate  - Encourage maximum independence but intervene and supervise when necessary  - Involve family in performance of ADLs  - Assess for home care needs following discharge   - Consider OT consult to assist with ADL evaluation and planning for discharge  - Provide patient education as appropriate  Outcome: Progressing  Goal: Maintains/Returns to pre admission functional level  Description: INTERVENTIONS:  - Perform BMAT or MOVE assessment daily    - Set and communicate daily mobility goal to care team and patient/family/caregiver  - Collaborate with rehabilitation services on mobility goals if consulted  - Perform Range of Motion  times a day  - Reposition patient every  hours    - Dangle patient  times a day  - Stand patient  times a day  - Ambulate patient  times a day  - Out of bed to chair  times a day   - Out of bed for meals times a day  - Out of bed for toileting  - Record patient progress and toleration of activity level   Outcome: Progressing     Problem: DISCHARGE PLANNING  Goal: Discharge to home or other facility with appropriate resources  Description: INTERVENTIONS:  - Identify barriers to discharge w/patient and caregiver  - Arrange for needed discharge resources and transportation as appropriate  - Identify discharge learning needs (meds, wound care, etc )  - Arrange for interpretive services to assist at discharge as needed  - Refer to Case Management Department for coordinating discharge planning if the patient needs post-hospital services based on physician/advanced practitioner order or complex needs related to functional status, cognitive ability, or social support system  Outcome: Progressing     Problem: Knowledge Deficit  Goal: Patient/family/caregiver demonstrates understanding of disease process, treatment plan, medications, and discharge instructions  Description: Complete learning assessment and assess knowledge base    Interventions:  - Provide teaching at level of understanding  - Provide teaching via preferred learning methods  Outcome: Progressing     Problem: CARDIOVASCULAR - ADULT  Goal: Maintains optimal cardiac output and hemodynamic stability  Description: INTERVENTIONS:  - Monitor I/O, vital signs and rhythm  - Monitor for S/S and trends of decreased cardiac output  - Administer and titrate ordered vasoactive medications to optimize hemodynamic stability  - Assess quality of pulses, skin color and temperature  - Assess for signs of decreased coronary artery perfusion  - Instruct patient to report change in severity of symptoms  Outcome: Progressing  Goal: Absence of cardiac dysrhythmias or at baseline rhythm  Description: INTERVENTIONS:  - Continuous cardiac monitoring, vital signs, obtain 12 lead EKG if ordered  - Administer antiarrhythmic and heart rate control medications as ordered  - Monitor electrolytes and administer replacement therapy as ordered  Outcome: Progressing

## 2021-08-14 NOTE — PROGRESS NOTES
[facility]  Progress Note Melissa Bonner 1940, 78 y o  female MRN: [id number]  Unit/Bed#: E4 -01 Encounter: [id number]  Primary Care Provider: Bouchra Fajardo DO   Date and time admitted to hospital: 8/13/2021  9:37 AM    * Palpitations  Assessment & Plan  75-year-old female with past medical history of proximal AFib, orthostatic hypotension, history of cancer status post head and neck radiation, dysphagia status post PEG tube presented with palpitations  On route via EMS, telemetry strip showed that patient was in atrial fibrillation with heart rates in the [address] [place] [address] cardiology outpatient, unable to tolerate beta-blockers due to hypotension  Continue digoxin every other day, Eliquis  Overnight, on telemetry patient had over 1 hour episodes on of atrial fibrillation, currently in sinus rhythm  Cardiology planning amiodarone versus other antiarrhythmics  Patient currently undecided, we will rediscuss tomorrow    UTI (urinary tract infection)  Assessment & Plan  UA was significant for bacteria  Urine cultures currently growing Gram-negative rods  Continue Rocephin  Urine cultures pending sensitivities    S/P percutaneous endoscopic gastrostomy (PEG) tube placement ([facility] [address] )  Assessment & Plan  PEG tube in place  Nutrition consult, appreciate recommendations for diet  Continue PEG feedings TwoCal q 6 hours with 150 cc flushes, okay for full liquid diet    Orthostatic hypotension  Assessment & Plan  Patient was hypotensive this morning, systolic of 62G  Known history of orthostatic hypotension and autonomic dysfunction, will increase midodrine to 5 mg t i d     Paroxysmal atrial fibrillation ([facility] [address] )  Assessment & Plan  As above        VTE Pharmacologic Prophylaxis:   Pharmacologic: Apixaban (Eliquis)  Mechanical VTE Prophylaxis in Place: Yes    Patient Centered Rounds: I have performed bedside rounds with nursing staff today      Discussions with Specialists or Other Care Team Provider: Cardiology    Education and Discussions with Family / Patient: Patient    Time Spent for Care: 30 minutes  More than 50% of total time spent on counseling and coordination of care as described above  Current Length of Stay: 0 day(s)    Current Patient Status: Inpatient   Certification Statement: The patient will continue to require additional inpatient hospital stay due to monitor BP, discussion of anti arrhythmics    Discharge Plan: 24 hours    Code Status: Level 3 - DNAR and DNI      Subjective:   No events overnight  This morning patient BP was found in the 80s and she was reportedly not feeling well  Denies any headaches, lightheadedness or dizziness  Objective:     Vitals:   Temp (24hrs), Av °F (36 7 °C), Min:97 7 °F (36 5 °C), Max:98 4 °F (36 9 °C)    Temp:  [97 7 °F (36 5 °C)-98 4 °F (36 9 °C)] 98 °F (36 7 °C)  HR:  [67-82] 82  Resp:  [16-18] 18  BP: ()/(54-77) 82/54  SpO2:  [95 %-98 %] 97 %  Body mass index is 19 44 kg/m²  Input and Output Summary (last 24 hours): Intake/Output Summary (Last 24 hours) at 2021 1435  Last data filed at 2021 1940  Gross per 24 hour   Intake 150 ml   Output --   Net 150 ml       Physical Exam:     Physical Exam  Vitals and nursing note reviewed  Constitutional:       Appearance: Normal appearance  HENT:      Head: Normocephalic and atraumatic  Eyes:      General: No scleral icterus  Conjunctiva/sclera: Conjunctivae normal    Cardiovascular:      Rate and Rhythm: Normal rate and regular rhythm  Heart sounds: Normal heart sounds  Pulmonary:      Effort: Pulmonary effort is normal       Comments: Decreased breath sounds  Abdominal:      General: Bowel sounds are normal  There is no distension  Palpations: Abdomen is soft  Tenderness: There is no abdominal tenderness  Comments: PEG Tube   Musculoskeletal:         General: No swelling  Right lower leg: No edema  Left lower leg: No edema  Skin:     General: Skin is warm and dry  Neurological:      General: No focal deficit present  Mental Status: She is alert  Mental status is at baseline  Additional Data:     Labs:    Results from last 7 days   Lab Units 08/14/21  0602 08/13/21  1005   WBC Thousand/uL 6 62 8 85   HEMOGLOBIN g/dL 11 4* 12 5   HEMATOCRIT % 35 4 39 3   PLATELETS Thousands/uL 258 265   NEUTROS PCT %  --  71   LYMPHS PCT %  --  15   MONOS PCT %  --  10   EOS PCT %  --  3     Results from last 7 days   Lab Units 08/14/21  0602 08/13/21  1005   SODIUM mmol/L 138 140   POTASSIUM mmol/L 4 5 4 2   CHLORIDE mmol/L 103 102   CO2 mmol/L 29 27   BUN mg/dL 35* 32*   CREATININE mg/dL 0 86 0 84   ANION GAP mmol/L 6 11   CALCIUM mg/dL 9 2 9 6   ALBUMIN g/dL  --  3 7   TOTAL BILIRUBIN mg/dL  --  0 43   ALK PHOS U/L  --  107   ALT U/L  --  6*   AST U/L  --  28   GLUCOSE RANDOM mg/dL 93 97     Results from last 7 days   Lab Units 08/13/21  1005   INR  1 21*                       * I Have Reviewed All Lab Data Listed Above  * Additional Pertinent Lab Tests Reviewed:  [address] Admission Reviewed    Imaging:    None    Recent Cultures (last 7 days):     Results from last 7 days   Lab Units 08/13/21  1203   URINE CULTURE  >100,000 cfu/ml Gram Negative Matias Enteric Like*       Last 24 Hours Medication List:   Current Facility-Administered Medications   Medication Dose Route Frequency Provider Last Rate    acetaminophen  650 mg Oral Q6H PRN An Oden MD      apixaban  2 5 mg Oral BID An Oden MD      atorvastatin  20 mg Oral Daily With Suzan Woodward MD      bacitracin  1 small application Topical BID PRN An Oden MD      cefTRIAXone  1,000 mg Intravenous Q24H An Oden MD 1,000 mg (08/13/21 1326)    digoxin  125 mcg Oral Every Other Day An Oden MD      levothyroxine  112 mcg Oral Early Morning An Oden MD      midodrine  5 mg Oral TID DO Zofia Perez ondansetron  4 mg Intravenous Q6H PRN Kathi Padgett MD          Today, Patient Was Seen By: Kathi Padgett MD    ** Please Note: Dictation voice to text software may have been used in the creation of this document   **

## 2021-08-14 NOTE — PLAN OF CARE
Problem: Potential for Falls  Goal: Patient will remain free of falls  Description: INTERVENTIONS:  - Educate patient/family on patient safety including physical limitations  - Instruct patient to call for assistance with activity   - Consult OT/PT to assist with strengthening/mobility   - Keep Call bell within reach  - Keep bed low and locked with side rails adjusted as appropriate  - Keep care items and personal belongings within reach  - Initiate and maintain comfort rounds  - Make Fall Risk Sign visible to staff  - Offer Toileting every  Hours, in advance of need  - Initiate/Maintain alarm  - Obtain necessary fall risk management equipment: - Apply yellow socks and bracelet for high fall risk patients  - Consider moving patient to room near nurses station  Outcome: Progressing     Problem: Nutrition/Hydration-ADULT  Goal: Nutrient/Hydration intake appropriate for improving, restoring or maintaining nutritional needs  Description: Monitor and assess patient's nutrition/hydration status for malnutrition  Collaborate with interdisciplinary team and initiate plan and interventions as ordered  Monitor patient's weight and dietary intake as ordered or per policy  Utilize nutrition screening tool and intervene as necessary  Determine patient's food preferences and provide high-protein, high-caloric foods as appropriate       INTERVENTIONS:  - Monitor oral intake, urinary output, labs, and treatment plans  - Assess nutrition and hydration status and recommend course of action  - Evaluate amount of meals eaten  - Assist patient with eating if necessary   - Allow adequate time for meals  - Recommend/ encourage appropriate diets, oral nutritional supplements, and vitamin/mineral supplements  - Order, calculate, and assess calorie counts as needed  - Recommend, monitor, and adjust tube feedings and TPN/PPN based on assessed needs  - Assess need for intravenous fluids  - Provide specific nutrition/hydration education as appropriate  - Include patient/family/caregiver in decisions related to nutrition  Outcome: Progressing     Problem: PAIN - ADULT  Goal: Verbalizes/displays adequate comfort level or baseline comfort level  Description: Interventions:  - Encourage patient to monitor pain and request assistance  - Assess pain using appropriate pain scale  - Administer analgesics based on type and severity of pain and evaluate response  - Implement non-pharmacological measures as appropriate and evaluate response  - Consider cultural and social influences on pain and pain management  - Notify physician/advanced practitioner if interventions unsuccessful or patient reports new pain  Outcome: Progressing     Problem: INFECTION - ADULT  Goal: Absence or prevention of progression during hospitalization  Description: INTERVENTIONS:  - Assess and monitor for signs and symptoms of infection  - Monitor lab/diagnostic results  - Monitor all insertion sites, i e  indwelling lines, tubes, and drains  - Monitor endotracheal if appropriate and nasal secretions for changes in amount and color  - Ronks appropriate cooling/warming therapies per order  - Administer medications as ordered  - Instruct and encourage patient and family to use good hand hygiene technique  - Identify and instruct in appropriate isolation precautions for identified infection/condition  Outcome: Progressing  Goal: Absence of fever/infection during neutropenic period  Description: INTERVENTIONS:  - Monitor WBC    Outcome: Progressing     Problem: SAFETY ADULT  Goal: Maintain or return to baseline ADL function  Description: INTERVENTIONS:  -  Assess patient's ability to carry out ADLs; assess patient's baseline for ADL function and identify physical deficits which impact ability to perform ADLs (bathing, care of mouth/teeth, toileting, grooming, dressing, etc )  - Assess/evaluate cause of self-care deficits   - Assess range of motion  - Assess patient's mobility; develop plan if impaired  - Assess patient's need for assistive devices and provide as appropriate  - Encourage maximum independence but intervene and supervise when necessary  - Involve family in performance of ADLs  - Assess for home care needs following discharge   - Consider OT consult to assist with ADL evaluation and planning for discharge  - Provide patient education as appropriate  Outcome: Progressing  Goal: Maintains/Returns to pre admission functional level  Description: INTERVENTIONS:  - Perform BMAT or MOVE assessment daily    - Set and communicate daily mobility goal to care team and patient/family/caregiver  - Collaborate with rehabilitation services on mobility goals if consulted  - Perform Range of Motion  times a day  - Reposition patient every  hours    - Dangle patient  times a day  - Stand patient  times a day  - Ambulate patient  times a day  - Out of bed to chair times a day   - Out of bed for meals times a day  - Out of bed for toileting  - Record patient progress and toleration of activity level   Outcome: Progressing     Problem: DISCHARGE PLANNING  Goal: Discharge to home or other facility with appropriate resources  Description: INTERVENTIONS:  - Identify barriers to discharge w/patient and caregiver  - Arrange for needed discharge resources and transportation as appropriate  - Identify discharge learning needs (meds, wound care, etc )  - Arrange for interpretive services to assist at discharge as needed  - Refer to Case Management Department for coordinating discharge planning if the patient needs post-hospital services based on physician/advanced practitioner order or complex needs related to functional status, cognitive ability, or social support system  Outcome: Progressing     Problem: Knowledge Deficit  Goal: Patient/family/caregiver demonstrates understanding of disease process, treatment plan, medications, and discharge instructions  Description: Complete learning assessment and assess knowledge base    Interventions:  - Provide teaching at level of understanding  - Provide teaching via preferred learning methods  Outcome: Progressing     Problem: CARDIOVASCULAR - ADULT  Goal: Maintains optimal cardiac output and hemodynamic stability  Description: INTERVENTIONS:  - Monitor I/O, vital signs and rhythm  - Monitor for S/S and trends of decreased cardiac output  - Administer and titrate ordered vasoactive medications to optimize hemodynamic stability  - Assess quality of pulses, skin color and temperature  - Assess for signs of decreased coronary artery perfusion  - Instruct patient to report change in severity of symptoms  Outcome: Progressing  Goal: Absence of cardiac dysrhythmias or at baseline rhythm  Description: INTERVENTIONS:  - Continuous cardiac monitoring, vital signs, obtain 12 lead EKG if ordered  - Administer antiarrhythmic and heart rate control medications as ordered  - Monitor electrolytes and administer replacement therapy as ordered  Outcome: Progressing

## 2021-08-14 NOTE — ASSESSMENT & PLAN NOTE
PEG tube in place  Nutrition consult, appreciate recommendations for diet  Continue PEG feedings TwoCal q 6 hours with 150 cc flushes, okay for full liquid diet

## 2021-08-14 NOTE — PROGRESS NOTES
Cardiology Progress Note   Ellena Clipper, MD Jiles Fanning, MD Claudean Purple, DO, [facility] MD Roe Barr DO, [facility], DO, [facility]  ----------------------------------------------------------------  [address] [facility] 78 [facility]  female MRN: [id number]  Unit/Bed#: E4 -01 Encounter: [id number]      ASSESSMENT:   · Palpitations  · Paroxysmal atrial fibrillation on Eliquis s/p Medtronic ILR w/ 1 hour 14 min episode on interrogation (8/13/2021)  · Urinary tract infection  · Orthostatic hypotension  · Pharmacologic nuclear stress test negative for myocardial ischemia, gated EF 73%, June 2021  · LVEF 48%, grade 1 diastolic dysfunction, mild MR/AR/TR w/ PASP 25 mmHg, March 2021  · Nonobstructive coronary disease by cardiac catheterization, 2018  · Emphysema  · History of lung cancer s/p left upper lung lobectomy  · History of PEG tube    PLAN:  Interrogation demonstrated 1 hour 14 minutes of atrial fibrillation  We discussed amiodarone use versus sotalol for antiarrhythmic drug therapy  Patient would like to hold off on initiation of therapy for now in think about AAD  She is apprehensive about potential adverse effects of either these medications  She has only had just over an hour of atrial fibrillation without syncope, occurring in the face of urinary tract infection  Cannot add beta-blocker due to borderline blood pressure  Would increase midodrine to 5 mg t i d   Keep potassium greater than 4 and magnesium greater than 2; replete p r n  Continue Eliquis and digoxin  Awaiting patient response on if she would like to try antiarrhythmic or or be discharged on current medications following treatment of UTI  After discharge, patient should be seen by electrophysiology and Dr Jesika Light    Signed: Ilan Ordoñez, [facility], [facility]      History of Present Illness:  Patient seen and examined     Patient had transient episode of hypotension this morning with a blood pressure of 82/54  There was some associated lightheadedness  She feels back to her baseline now  Denies chest pain, pressure, tightness or squeezing  Denies palpitations, lower extremity swelling, orthopnea or paroxysmal nocturnal dyspnea  Review of Systems:  Review of Systems   Constitutional: Negative for decreased appetite, fever, weight gain and weight loss  HENT: Negative for congestion and sore throat  Eyes: Negative for visual disturbance  Cardiovascular: Negative for chest pain, dyspnea on exertion, leg swelling, near-syncope and palpitations  Respiratory: Negative for cough and shortness of breath  Hematologic/Lymphatic: Negative for bleeding problem  Skin: Negative for rash  Musculoskeletal: Negative for myalgias and neck pain  Gastrointestinal: Negative for abdominal pain and nausea  Neurological: Positive for light-headedness  Negative for weakness  Psychiatric/Behavioral: Negative for depression  Allergies   Allergen Reactions    Albuterol Other (See Comments)     Other reaction(s): Other (See Comments)  Afib  Causes afib      Methylprednisolone Other (See Comments)     Dizziness  Patient states adverse effect, "i feel weird in the head"       No current facility-administered medications on file prior to encounter       Current Outpatient Medications on File Prior to Encounter   Medication Sig    apixaban (ELIQUIS) 2 5 mg Take 1 tablet (2 5 mg total) by mouth 2 (two) times a day    Ascorbic Acid (vitamin C) 100 MG tablet Take 3,000 mg by mouth daily     atorvastatin (LIPITOR) 20 mg tablet atorvastatin 20 mg tablet    cholecalciferol (VITAMIN D3) 400 units tablet Take 400 Units by mouth daily     digoxin (LANOXIN) 0 125 mg tablet Take 1 tablet (125 mcg total) by mouth every other day    levothyroxine 112 mcg tablet Take 112 mcg by mouth daily 100mg daily    midodrine (PROAMATINE) 2 5 mg tablet Take 1 tablet (2 5 mg total) by mouth 3 (three) times a day 3 times daily during daytime at least 6 hours apart  Do not take if blood pressure greater is greater than 130/80   [DISCONTINUED] bisoprolol (ZEBETA) 5 mg tablet Take 0 5 tablets (2 5 mg total) by mouth daily (Patient not taking: Reported on 7/30/2021)        Current Facility-Administered Medications   Medication Dose Route Frequency Provider Last Rate    acetaminophen  650 mg Oral Q6H PRN Fabián Noe MD      apixaban  2 5 mg Oral BID Fabián Noe MD      atorvastatin  20 mg Oral Daily With Dione Zapien MD      bacitracin  1 small application Topical BID PRN Fabián Noe MD      cefTRIAXone  1,000 mg Intravenous Q24H Fabián Noe MD 1,000 mg (08/13/21 1326)    digoxin  125 mcg Oral Every Other Day Fabián Noe MD      levothyroxine  112 mcg Oral Early Morning Fabián Noe MD      midodrine  2 5 mg Oral TID Fabián Noe MD      ondansetron  4 mg Intravenous Q6H PRN Fabián Noe MD              Vitals:    08/14/21 0314 08/14/21 0600 08/14/21 0745 08/14/21 1039   BP: 107/54  141/77 (!) 82/54   BP Location: Left arm  Left arm Left arm   Pulse: 73  73 82   Resp: 16  16 18   Temp: 98 2 °F (36 8 °C)  98 4 °F (36 9 °C) 98 °F (36 7 °C)   TempSrc: Temporal  Temporal Temporal   SpO2: 98%  95% 97%   Weight:  53 kg (116 lb 13 5 oz)           Intake/Output Summary (Last 24 hours) at 8/14/2021 1252  Last data filed at 8/13/2021 1940  Gross per 24 hour   Intake 150 ml   Output --   Net 150 ml       Weight change:     PHYSICAL EXAMINATION:  Gen: Awake, Alert, NAD, frail-appearing  Head/eyes: AT/NC, pupils equal and round, Anicteric  ENT: mmm  Neck: Supple, No elevated JVP, trachea midline  Resp: CTA bilaterally no w/r/r  CV: RRR +S1, S2, No m/r/g  Abd: Soft, NT/ND + BS  Ext: no LE edema bilaterally  Neuro:  Follows commands, moves all extermities  Psych: Appropriate affect, normal mood, pleasant attitude, non-combative  Skin: warm; no rash, erythema or venous stasis changes on exposed skin    Lab Results:  Results from last 7 days   Lab Units 08/14/21  0602   WBC Thousand/uL 6 62   HEMOGLOBIN g/dL 11 4*   HEMATOCRIT % 35 4   PLATELETS Thousands/uL 258     Results from last 7 days   Lab Units 08/14/21  0602 08/13/21  1005   POTASSIUM mmol/L 4 5 4 2   CHLORIDE mmol/L 103 102   CO2 mmol/L 29 27   BUN mg/dL 35* 32*   CREATININE mg/dL 0 86 0 84   CALCIUM mg/dL 9 2 9 6   ALK PHOS U/L  --  107   ALT U/L  --  6*   AST U/L  --  28     No results found for: TROPONINT      Results from last 7 days   Lab Units 08/13/21  1005   TROPONIN I ng/mL <0 02     Results from last 7 days   Lab Units 08/13/21  1005   INR  1 21*       Tele: SR    This note was completed in part utilizing [employer] Fluency Direct Software  Grammatical errors, random word insertions, spelling mistakes, and incomplete sentences may be an occasional consequence of this system secondary to software limitations, ambient noise, and hardware issues  If you have any questions or concerns about the content, text, or information contained within the body of this dictation, please contact the provider for clarification

## 2021-08-14 NOTE — ASSESSMENT & PLAN NOTE
24-year-old female with past medical history of proximal AFib, orthostatic hypotension, history of cancer status post head and neck radiation, dysphagia status post PEG tube presented with palpitations  On route via EMS, telemetry strip showed that patient was in atrial fibrillation with heart rates in the [address] [place] [address] cardiology outpatient, unable to tolerate beta-blockers due to hypotension  Continue digoxin every other day, Eliquis  Overnight, on telemetry patient had over 1 hour episodes on of atrial fibrillation, currently in sinus rhythm  Cardiology planning amiodarone versus other antiarrhythmics  Patient currently undecided, we will rediscuss tomorrow

## 2021-08-14 NOTE — ASSESSMENT & PLAN NOTE
Patient was hypotensive this morning, systolic of 51Z  Known history of orthostatic hypotension and autonomic dysfunction, will increase midodrine to 5 mg t i d

## 2021-08-15 NOTE — PLAN OF CARE
Problem: OCCUPATIONAL THERAPY ADULT  Goal: Performs self-care activities at highest level of function for planned discharge setting  See evaluation for individualized goals  Description: Treatment Interventions: ADL retraining, Functional transfer training, UE strengthening/ROM, Endurance training, Patient/family training, Equipment evaluation/education, Compensatory technique education, Energy conservation, Activityengagement          See flowsheet documentation for full assessment, interventions and recommendations  Note: Limitation: Decreased ADL status, Decreased UE strength, Decreased Safe judgement during ADL, Decreased cognition, Decreased endurance, Decreased self-care trans, Decreased high-level ADLs  Prognosis: Fair  Assessment: Pt is a 78 y o  female seen for OT evaluation s/p adm to [facility] on 8/13/2021 w/ dizziness, Palpitations, and UTI Comorbidities affecting pts functional performance include a significant PMH of Arrhythmia, Asthma, A-Fib, CA, CAD, Dysphagia, Gastroparesis, HLD, HTN, Orthostatic hypotension, and s/p PEG tube placement Pt with active OT orders and activity orders for Activity as tolerated  Pt lives alone in a one level house with [facility]  Pt reports local family/friends, however reports they haven't been helping her  At baseline, pt was I w/ ADLs, IADLs, and functional mobility/transfers w/o use of AD, (+) , and denies falls PTA  Upon evaluation, pt currently requires Supervision for UB ADLs, Min A for LB ADLs, Supervision for toileting, Supervision for bed mobility, and Supervision for functional mobility/transfers 2* the following deficits impacting occupational performance: decreased strength, decreased balance, decreased tolerance and decreased safety awareness   These impairments, as well at pts steps to enter environment, limited home support, difficulty performing ADLS, difficulty performing IADLS  and limited insight into deficits limit pts ability to safely engage in all baseline areas of occupation  The patient's raw score on the AM-PAC Daily Activity inpatient short form is 21, standardized score is 44 27, greater than 39 4  Patients at this level are likely to benefit from discharge to home  Please refer to the recommendation of the Occupational Therapist for safe discharge planning  Based on the aforementioned OT evaluation, functional performance deficits, and assessments, pt has been identified as a Moderate complexity evaluation  Pt to continue to benefit from continued acute OT services during hospital stay to address defined deficits and to maximize level of functional independence in the following Occupational Performance areas: grooming, bathing/shower, toilet hygiene, dressing, medication management, health maintenance, functional mobility, community mobility, clothing management, cleaning, meal prep and household maintenance  From OT standpoint, recommend Home w/ social support and continued PT upon D/C   OT will continue to follow pt 2-3x/wk to address the following goals to  w/in 10-14 days:     OT Discharge Recommendation: No rehabilitation needs (Home w/ social support and OPPT)  OT - OK to Discharge: Yes (when medically cleared)

## 2021-08-15 NOTE — OCCUPATIONAL THERAPY NOTE
Letter by Niko Chinchilla MD at      Author: Niko Chinchilla MD Service: -- Author Type: --    Filed:  Encounter Date: 2/5/2020 Status: (Other)         February 5, 2020     Patient: Oscar Marie   YOB: 2021   Date of Visit: 2/5/2020       To Whom it May Concern:    Oscar Marie was seen in my clinic on 2/5/2020.    Please excuse his mother Allyson Spencer from work as she needed to care for a sick infant starting on Feb 4 and 5.    If you have any questions or concerns, please don't hesitate to call.    Sincerely,             Electronically signed by Niko Chinchilla MD   2/5/2020, 10:18 AM         Occupational Therapy Evaluation     Patient Name: Leyla Blanca  MKJMA'D Date: 8/15/2021  Problem List  Principal Problem:    Palpitations  Active Problems:    Paroxysmal atrial fibrillation (HCC)    Centrilobular emphysema (HCC)    History of head and neck radiation    Orthostatic hypotension    S/P percutaneous endoscopic gastrostomy (PEG) tube placement (HCC)    UTI (urinary tract infection)    Past Medical History  Past Medical History:   Diagnosis Date    Abnormal ECG     Arrhythmia     Asthma     Atrial fibrillation (HCC)     Autonomic dysfunction     Cancer (HCC)     lung, head and neck    Coronary artery disease     Disease of thyroid gland     Dysphagia     Gastroparesis     Heart murmur     mitral and aortic regurg    Hyperlipidemia     Hypertension     Irregular heart beat      Past Surgical History  Past Surgical History:   Procedure Laterality Date    CARDIAC CATHETERIZATION      CARDIAC LOOP RECORDER      ESOPHAGOGASTRODUODENOSCOPY W/ PEG      HEAD AND NECK DEBRIDEMENT      LUNG SURGERY Left     upper lobectomy    PEG TUBE PLACEMENT  01/15/2021    By Dr Laxmi Carr  2017    PEG TUBE PLACEMENT  06/2020    Balloon gastrostomy-replaced 3 more times secondary to balloon failure and Balloon falling out           08/15/21 0828   Note Type   Note type Evaluation   Restrictions/Precautions   Weight Bearing Precautions Per Order No   Other Precautions Cognitive; Chair Alarm; Bed Alarm; Fall Risk;Pain;Hard of hearing   Pain Assessment   Pain Assessment Tool 0-10   Pain Score 8   Pain Location/Orientation Orientation: Mid;Location: Back   Hospital Pain Intervention(s) Repositioned; Ambulation/increased activity; Emotional support; Rest   Multiple Pain Sites No   Home Living   Type of [address] [place] [address] One level;Stairs to enter with rails  (2 RYAN)   Bathroom Shower/Tub Walk-in shower   Bathroom Toilet Raised   Bathroom Equipment Grab bars in shower; Shower chair Bathroom Accessibility Accessible   Home Equipment Cane   Additional Comments Pt lives alone in a one level house with 2 YRAN  Pt reports local family/friends, however reports they haven't been helping her  Prior Function   Level of [place] Independent with ADLs and functional mobility   Lives With Alone   ADL Assistance Independent   IADLs Independent  (although reports increased difficulty recently)   Falls in the last 6 months 0   Vocational Retired   Comments At baseline, pt was I w/ ADLs, IADLs, and functional mobility/transfers w/o use of AD, (+) , and denies falls PTA  Lifestyle   Autonomy At baseline, pt was I w/ ADLs, IADLs, and functional mobility/transfers w/o use of AD, (+) , and denies falls PTA  Reciprocal Relationships Local family/friends   Service to Others Retired   Intrinsic Gratification Watching TV   Psychosocial   Psychosocial (WDL) WDL   ADL   Where Assessed Edge of bed   Eating Assistance 5  Supervision/Setup   Grooming Assistance 5  Supervision/Setup   UB Bathing Assistance 5  Supervision/Setup   LB Bathing Assistance 4  Minimal [facility] [address] 5  [address] [facility] 5  Supervision/Setup   Bed Mobility   Supine to Sit 5  Supervision   Additional items HOB elevated; Increased time required;Verbal cues   Sit to Supine 5  Supervision   Additional items Increased time required;Verbal cues   Additional Comments Pt lying supine at end of session w/ bed alarm activated  Call bell and phone within reach   All needs met and pt reports no further questions for OT at this time   Transfers   Sit to Stand 5  Supervision   Additional items Verbal cues   Stand to Sit 5  Supervision   Additional items Verbal cues   Additional Comments Cues for hand placement   Functional Mobility   Functional Mobility 5  Supervision   Additional Comments Assist x1 w/o use of AD   Balance   Static Sitting Fair +   Dynamic Sitting Fair   Static Standing Fair   Dynamic Standing Fair -   Ambulatory Fair -   Activity Tolerance   Activity Tolerance Patient tolerated treatment well   Medical Staff Made Devon Howard, PT   Nurse Made Aware yes; Maurice Guevara RN   RUE Assessment   RUE Assessment WFL   RUE Strength   RUE Overall Strength Within Functional Limits - able to perform ADL tasks with strength  (4-/5 throughout)   LUE Assessment   LUE Assessment WFL   LUE Strength   LUE Overall Strength Within Functional Limits - able to perform ADL tasks with strength  (4-/5 throughout)   Hand Function   Gross Motor Coordination Functional   Fine Motor Coordination Functional   Sensation   Light Touch Partial deficits in the RUE;Partial deficits in the LUE;Partial deficits in the RLE;Partial deficits in the LLE   Proprioception   Proprioception No apparent deficits   Vision-Basic Assessment   Current Vision No visual deficits   Vision - Complex Assessment   Acuity Able to read clock/calendar on wall without difficulty; Able to read employee name badge without difficulty   Perception   Inattention/Neglect Appears intact   Cognition   Overall Cognitive Status Impaired   Arousal/Participation Alert; Cooperative   Attention Attends with cues to redirect   Orientation Level Oriented to person;Oriented to place;Oriented to time   Memory Decreased recall of precautions   Following Commands Follows one step commands without difficulty   Comments Pleasant, cues for redirection  Limited insight into deficits   Assessment   Limitation Decreased ADL status; Decreased UE strength;Decreased Safe judgement during ADL;Decreased cognition;Decreased endurance;Decreased self-care trans;Decreased high-level ADLs   Prognosis Fair   Assessment Pt is a 78 y o  female seen for OT evaluation s/p adm to [facility] on 8/13/2021 w/ dizziness, Palpitations, and UTI Comorbidities affecting pts functional performance include a significant PMH of Arrhythmia, Asthma, A-Fib, CA, CAD, Dysphagia, Gastroparesis, HLD, HTN, Orthostatic hypotension, and s/p PEG tube placement Pt with active OT orders and activity orders for Activity as tolerated  Pt lives alone in a one level house with 2 RYAN  Pt reports local family/friends, however reports they haven't been helping her  At baseline, pt was I w/ ADLs, IADLs, and functional mobility/transfers w/o use of AD, (+) , and denies falls PTA  Upon evaluation, pt currently requires Supervision for UB ADLs, Min A for LB ADLs, Supervision for toileting, Supervision for bed mobility, and Supervision for functional mobility/transfers 2* the following deficits impacting occupational performance: decreased strength, decreased balance, decreased tolerance and decreased safety awareness  These impairments, as well at pts steps to enter environment, limited home support, difficulty performing ADLS, difficulty performing IADLS  and limited insight into deficits limit pts ability to safely engage in all baseline areas of occupation  The patient's raw score on the AM-PAC Daily Activity inpatient short form is 21, standardized score is 44 27, greater than 39 4  Patients at this level are likely to benefit from discharge to home  Please refer to the recommendation of the Occupational Therapist for safe discharge planning  Based on the aforementioned OT evaluation, functional performance deficits, and assessments, pt has been identified as a Moderate complexity evaluation  Pt to continue to benefit from continued acute OT services during hospital stay to address defined deficits and to maximize level of functional independence in the following Occupational Performance areas: grooming, bathing/shower, toilet hygiene, dressing, medication management, health maintenance, functional mobility, community mobility, clothing management, cleaning, meal prep and household maintenance   From OT standpoint, recommend Home w/ social support and continued PT upon D/C  OT will continue to follow pt 2-3x/wk to address the following goals to  w/in 10-14 days:   Goals   Patient Goals To get more help at home   LTG Time Frame 10-14   Long Term Goal Please refer to LTGs listed below   Plan   Treatment Interventions ADL retraining;Functional transfer training;UE strengthening/ROM; Endurance training;Patient/family training;Equipment evaluation/education; Compensatory technique education; Energy conservation; Activityengagement   Goal Expiration Date 21   OT Treatment Day 0   OT Frequency 2-3x/wk   Recommendation   OT Discharge Recommendation No rehabilitation needs  (Home w/ social support and OPPT)   OT - OK to Discharge Yes  (when medically cleared)   AM-PAC Daily Activity Inpatient   Lower Body Dressing 3   Bathing 3   Toileting 3   Upper Body Dressing 4   Grooming 4   Eating 4   Daily Activity Raw Score 21   Daily Activity Standardized Score (Calc for Raw Score >=11) 44 27   AM-PAC Applied Cognition Inpatient   Following a Speech/Presentation 4   Understanding Ordinary Conversation 4   Taking Medications 3   Remembering Where Things Are Placed or Put Away 4   Remembering List of 4-5 Errands 3   Taking Care of Complicated Tasks 3   Applied Cognition Raw Score 21   Applied Cognition Standardized Score 44 3       GOALS    1  Pt will improve activity tolerance to G for min 30 min txment sessions for increase engagement in functional tasks    2  Pt will complete bed mobility at a Mod I level w/ G balance/safety demonstrated to decrease caregiver assistance required     3  Pt will complete UB dressing/self care w/ mod I using adaptive device and DME as needed     4  Pt will complete LB dressing/self care w/ mod I using adaptive device and DME as needed    5  Pt will complete toileting w/ mod I w/ G hygiene/thoroughness using DME as needed    6   Pt will improve functional transfers to Mod I on/off all surfaces using DME as needed w/ G balance/safety     7  Pt will improve functional mobility during ADL/IADL/leisure tasks to Mod I using DME as needed w/ G balance/safety     8  Pt will be attentive 100% of the time during ongoing cognitive assessment w/ G participation to assist w/ safe d/c planning/recommendations    9  Pt will demonstrate G carryover of pt/caregiver education and training as appropriate w/o cues w/ good tolerance to increase safety during functional tasks    10  Pt will participate in simulated IADL management task to increase independence to Mod I w/ G safety and endurance    11  Pt will increase BUE strength by 1MM grade via AROM exercises to increase independence in ADLs and transfers    12  Pt will verbalize 3 potential fall hazards and identify appropriate compensatory techniques to decrease fall risk in home environment     13   Pt will increase standing tolerance to 10-15 mins with Fair+ dynamic standing balance to increase safety during participation in ADLs           Zac Quiroz, OTR/L

## 2021-08-15 NOTE — PROGRESS NOTES
Cardiology Progress Note   MD Keira Thomas MD Calvin Donovan, DO, MD Natalia Mendez DO, Taran Jackson DO, [facility]  ----------------------------------------------------------------  [address] 78 y o  female MRN: [id number]  Unit/Bed#: E4 -01 Encounter: [id number]      ASSESSMENT:   · Palpitations  · Paroxysmal atrial fibrillation on Eliquis s/p Medtronic ILR w/ 1 hour 14 min episode on interrogation (8/13/2021)  · Urinary tract infection  · Orthostatic hypotension  · Pharmacologic nuclear stress test negative for myocardial ischemia, gated EF 73%, June 2021  · LVEF 97%, grade 1 diastolic dysfunction, mild MR/AR/TR w/ PASP 25 mmHg, March 2021  · Nonobstructive coronary disease by cardiac catheterization, 2018  · Emphysema  · History of lung cancer s/p left upper lung lobectomy  · History of PEG tube    PLAN:  After thinking about it, the patient does not wish to be placed on antiarrhythmic drug therapy in the hospital during this hospitalization  Risks and benefits have been discussed and she would like to be seen by Dr Rock Castillo and EP in the office  Notification has been sent to the office to set up these appointments  Should she have any further symptoms concerning for atrial fibrillation, she should call the office or seek immediate medical attention  Blood pressures have been stable on new dose of midodrine  Continue Eliquis and digoxin  Treatment of urinary tract infection as per primary service  Follow-up within 2 weeks of discharge for additional CV testing as clinically    Signed: Trevor Weiss DO, [facility], FACP      History of Present Illness:  Patient seen and examined  Denies chest pain, pressure, tightness or squeezing  Denies further lightheadedness  Denies dizziness or palpitations  Denies lower extremity swelling, orthopnea or paroxysmal nocturnal dyspnea    Admits to feeling improved today  Review of Systems:  Review of Systems   Constitutional: Negative for decreased appetite, fever, weight gain and weight loss  HENT: Negative for congestion and sore throat  Eyes: Negative for visual disturbance  Cardiovascular: Negative for chest pain, dyspnea on exertion, leg swelling, near-syncope and palpitations  Respiratory: Negative for cough and shortness of breath  Hematologic/Lymphatic: Negative for bleeding problem  Skin: Negative for rash  Musculoskeletal: Negative for myalgias and neck pain  Gastrointestinal: Negative for abdominal pain and nausea  Neurological: Positive for light-headedness  Negative for weakness  Psychiatric/Behavioral: Negative for depression  Allergies   Allergen Reactions    Albuterol Other (See Comments)     Other reaction(s): Other (See Comments)  Afib  Causes afib      Methylprednisolone Other (See Comments)     Dizziness  Patient states adverse effect, "i feel weird in the head"       No current facility-administered medications on file prior to encounter  Current Outpatient Medications on File Prior to Encounter   Medication Sig    apixaban (ELIQUIS) 2 5 mg Take 1 tablet (2 5 mg total) by mouth 2 (two) times a day    Ascorbic Acid (vitamin C) 100 MG tablet Take 3,000 mg by mouth daily     atorvastatin (LIPITOR) 20 mg tablet atorvastatin 20 mg tablet    cholecalciferol (VITAMIN D3) 400 units tablet Take 400 Units by mouth daily     digoxin (LANOXIN) 0 125 mg tablet Take 1 tablet (125 mcg total) by mouth every other day    levothyroxine 112 mcg tablet Take 112 mcg by mouth daily 100mg daily    midodrine (PROAMATINE) 2 5 mg tablet Take 1 tablet (2 5 mg total) by mouth 3 (three) times a day 3 times daily during daytime at least 6 hours apart  Do not take if blood pressure greater is greater than 130/80      [DISCONTINUED] bisoprolol (ZEBETA) 5 mg tablet Take 0 5 tablets (2 5 mg total) by mouth daily (Patient not taking: Reported on 7/30/2021)        Current Facility-Administered Medications   Medication Dose Route Frequency Provider Last Rate    acetaminophen  650 mg Oral Q6H PRN Tone Rockwell MD      apixaban  2 5 mg Oral BID Tone Rockwell MD      atorvastatin  20 mg Oral Daily With Garfield Patton MD      bacitracin  1 small application Topical BID PRN Tone Rockwell MD      cefTRIAXone  1,000 mg Intravenous Q24H Tone Rockwell MD 1,000 mg (08/14/21 1514)    digoxin  125 mcg Oral Every Other Day Tone Rockwell MD      levothyroxine  112 mcg Oral Early Morning Tone Rockwell MD      midodrine  5 mg Oral TID Maya Wen DO      ondansetron  4 mg Intravenous Q6H PRN Tone Rockwell MD              Vitals:    08/15/21 0528 08/15/21 0529 08/15/21 0600 08/15/21 0700   BP: (!) 175/92 104/74  146/65   BP Location: Left arm Left arm  Left arm   Pulse:    77   Resp:    18   Temp:    97 8 °F (36 6 °C)   TempSrc:    Temporal   SpO2:    98%   Weight:   53 6 kg (118 lb 2 7 oz)          Intake/Output Summary (Last 24 hours) at 8/15/2021 1208  Last data filed at 8/14/2021 1230  Gross per 24 hour   Intake 150 ml   Output --   Net 150 ml       Weight change: -2 4 kg (-5 lb 4 7 oz)    PHYSICAL EXAMINATION:  Gen: Awake, Alert, NAD, frail-appearing  Head/eyes: AT/NC, pupils equal and round, Anicteric  ENT: mmm  Neck: Supple, No elevated JVP, trachea midline  Resp: CTA bilaterally no w/r/r  CV: RRR +S1, S2, No m/r/g  Abd: Soft, NT/ND + BS  Ext: no LE edema bilaterally  Neuro:  Follows commands, moves all extermities  Psych: Appropriate affect, normal mood, pleasant attitude, non-combative  Skin: warm; no rash, erythema or venous stasis changes on exposed skin    Lab Results:  Results from last 7 days   Lab Units 08/14/21  0602   WBC Thousand/uL 6 62   HEMOGLOBIN g/dL 11 4*   HEMATOCRIT % 35 4   PLATELETS Thousands/uL 258     Results from last 7 days   Lab Units 08/14/21  0602 08/13/21  1005   POTASSIUM mmol/L 4 5 4 2 CHLORIDE mmol/L 103 102   CO2 mmol/L 29 27   BUN mg/dL 35* 32*   CREATININE mg/dL 0 86 0 84   CALCIUM mg/dL 9 2 9 6   ALK PHOS U/L  --  107   ALT U/L  --  6*   AST U/L  --  28     No results found for: TROPONINT      Results from last 7 days   Lab Units 08/13/21  1005   TROPONIN I ng/mL <0 02     Results from last 7 days   Lab Units 08/13/21  1005   INR  1 21*       Tele: SR    This note was completed in part utilizing M-Modal Fluency Direct Software  Grammatical errors, random word insertions, spelling mistakes, and incomplete sentences may be an occasional consequence of this system secondary to software limitations, ambient noise, and hardware issues  If you have any questions or concerns about the content, text, or information contained within the body of this dictation, please contact the provider for clarification

## 2021-08-15 NOTE — PLAN OF CARE
Problem: PHYSICAL THERAPY ADULT  Goal: Performs mobility at highest level of function for planned discharge setting  See evaluation for individualized goals  Description: Treatment/Interventions: Functional transfer training, LE strengthening/ROM, Elevations, Therapeutic exercise, Endurance training, Equipment eval/education, Patient/family training, Bed mobility, Gait training, Compensatory technique education, Spoke to nursing, OT          See flowsheet documentation for full assessment, interventions and recommendations  Note: Prognosis: Fair  Problem List: Decreased strength, Impaired balance, Decreased mobility, Impaired judgement, Decreased safety awareness, Impaired hearing, Impaired sensation, Decreased skin integrity, Pain, Orthopedic restrictions  Assessment: Padmini Dominguez is a 78 y o  female admitted to [facility] on 8/13/2021 for Palpitations  PT was consulted and pt was seen on 8/15/2021 for mobility assessment and d/c planning  Pt presents w pain  Reports at baseline indep without an AD  Admits to not doing IADLs since her back fx (states never cooking 2* PEG)  Pt is currently functioning at a supervision assistance x1 level for bed mobility, transfers and ambulation without an AD household distances  Appears a bit unsteady on her feet but no gross LOB observed  Patient expressed multiple concerns re: not having help at home, having recent difficulty w IADLs and ongoing issues w BLE neuropathy and impaired balance  We did discuss use of OTC ankle brace for support given c/o of L ankle instability  Noted some discrepancies in reported social hx and information gathered from chart review; ? if patient is lonely/ depressed as chart indicated pt spouse recently dc to nursing home following TBI and pt reports limited support systems   Pt will benefit from continued skilled IP PT to address the above mentioned impairments  in order to maximize recovery and increase functional independence when completing mobility and ADLs  At this time PT recommendations for d/c are continue w OPPT as tolerated  Bed alarm engaged end of session  Barriers to Discharge: Decreased caregiver support  Barriers to Discharge Comments: lives alone, reports limited support     PT Discharge Recommendation: Home with outpatient rehabilitation     PT - OK to Discharge: Yes    See flowsheet documentation for full assessment

## 2021-08-15 NOTE — PLAN OF CARE
Problem: Potential for Falls  Goal: Patient will remain free of falls  Description: INTERVENTIONS:  - Educate patient/family on patient safety including physical limitations  - Instruct patient to call for assistance with activity   - Consult OT/PT to assist with strengthening/mobility   - Keep Call bell within reach  - Keep bed low and locked with side rails adjusted as appropriate  - Keep care items and personal belongings within reach  - Initiate and maintain comfort rounds  - Make Fall Risk Sign visible to staff  - Offer Toileting every  Hours, in advance of need  - Initiate/Maintain alarm  - Obtain necessary fall risk management equipment:   - Apply yellow socks and bracelet for high fall risk patients  - Consider moving patient to room near nurses station  8/15/2021 1009 by Lorraine Mukherjee RN  Outcome: Adequate for Discharge  8/15/2021 0931 by Lorraine Mukherjee RN  Outcome: Progressing     Problem: Nutrition/Hydration-ADULT  Goal: Nutrient/Hydration intake appropriate for improving, restoring or maintaining nutritional needs  Description: Monitor and assess patient's nutrition/hydration status for malnutrition  Collaborate with interdisciplinary team and initiate plan and interventions as ordered  Monitor patient's weight and dietary intake as ordered or per policy  Utilize nutrition screening tool and intervene as necessary  Determine patient's food preferences and provide high-protein, high-caloric foods as appropriate       INTERVENTIONS:  - Monitor oral intake, urinary output, labs, and treatment plans  - Assess nutrition and hydration status and recommend course of action  - Evaluate amount of meals eaten  - Assist patient with eating if necessary   - Allow adequate time for meals  - Recommend/ encourage appropriate diets, oral nutritional supplements, and vitamin/mineral supplements  - Order, calculate, and assess calorie counts as needed  - Recommend, monitor, and adjust tube feedings and TPN/PPN based on assessed needs  - Assess need for intravenous fluids  - Provide specific nutrition/hydration education as appropriate  - Include patient/family/caregiver in decisions related to nutrition  8/15/2021 1009 by Oleg Manuel RN  Outcome: Adequate for Discharge  8/15/2021 0931 by Oleg Manuel RN  Outcome: Progressing     Problem: PAIN - ADULT  Goal: Verbalizes/displays adequate comfort level or baseline comfort level  Description: Interventions:  - Encourage patient to monitor pain and request assistance  - Assess pain using appropriate pain scale  - Administer analgesics based on type and severity of pain and evaluate response  - Implement non-pharmacological measures as appropriate and evaluate response  - Consider cultural and social influences on pain and pain management  - Notify physician/advanced practitioner if interventions unsuccessful or patient reports new pain  8/15/2021 1009 by Oleg Manuel RN  Outcome: Adequate for Discharge  8/15/2021 0931 by Oleg Manuel RN  Outcome: Progressing     Problem: INFECTION - ADULT  Goal: Absence or prevention of progression during hospitalization  Description: INTERVENTIONS:  - Assess and monitor for signs and symptoms of infection  - Monitor lab/diagnostic results  - Monitor all insertion sites, i e  indwelling lines, tubes, and drains  - Monitor endotracheal if appropriate and nasal secretions for changes in amount and color  - [place] appropriate cooling/warming therapies per order  - Administer medications as ordered  - Instruct and encourage patient and family to use good hand hygiene technique  - Identify and instruct in appropriate isolation precautions for identified infection/condition  8/15/2021 1009 by Oleg Manuel RN  Outcome: Adequate for Discharge  8/15/2021 0931 by Oleg Manuel RN  Outcome: Progressing  Goal: Absence of fever/infection during neutropenic period  Description: INTERVENTIONS:  - Monitor WBC    8/15/2021 1009 by Fan Adan RN  Outcome: Adequate for Discharge  8/15/2021 0931 by Fan Adan RN  Outcome: Progressing     Problem: SAFETY ADULT  Goal: Maintain or return to baseline ADL function  Description: INTERVENTIONS:  -  Assess patient's ability to carry out ADLs; assess patient's baseline for ADL function and identify physical deficits which impact ability to perform ADLs (bathing, care of mouth/teeth, toileting, grooming, dressing, etc )  - Assess/evaluate cause of self-care deficits   - Assess range of motion  - Assess patient's mobility; develop plan if impaired  - Assess patient's need for assistive devices and provide as appropriate  - Encourage maximum independence but intervene and supervise when necessary  - Involve family in performance of ADLs  - Assess for home care needs following discharge   - Consider OT consult to assist with ADL evaluation and planning for discharge  - Provide patient education as appropriate  8/15/2021 1009 by Fan Adan RN  Outcome: Adequate for Discharge  8/15/2021 0931 by Fan Adan RN  Outcome: Progressing  Goal: Maintains/Returns to pre admission functional level  Description: INTERVENTIONS:  - Perform BMAT or MOVE assessment daily    - Set and communicate daily mobility goal to care team and patient/family/caregiver  - Collaborate with rehabilitation services on mobility goals if consulted  - Perform Range of Motion  times a day  - Reposition patient every  hours    - Dangle patient  times a day  - Stand patient  times a day  - Ambulate patient  times a day  - Out of bed to chair  times a day   - Out of bed for meal times a day  - Out of bed for toileting  - Record patient progress and toleration of activity level   8/15/2021 1009 by Fan Adan RN  Outcome: Adequate for Discharge  8/15/2021 0931 by Fan Adan RN  Outcome: Progressing     Problem: DISCHARGE PLANNING  Goal: Discharge to home or other facility with appropriate resources  Description: INTERVENTIONS:  - Identify barriers to discharge w/patient and caregiver  - Arrange for needed discharge resources and transportation as appropriate  - Identify discharge learning needs (meds, wound care, etc )  - Arrange for interpretive services to assist at discharge as needed  - Refer to Case Management Department for coordinating discharge planning if the patient needs post-hospital services based on physician/advanced practitioner order or complex needs related to functional status, cognitive ability, or social support system  8/15/2021 1009 by Valerie Madrid RN  Outcome: Adequate for Discharge  8/15/2021 0931 by Valerie Madrid RN  Outcome: Progressing     Problem: Knowledge Deficit  Goal: Patient/family/caregiver demonstrates understanding of disease process, treatment plan, medications, and discharge instructions  Description: Complete learning assessment and assess knowledge base    Interventions:  - Provide teaching at level of understanding  - Provide teaching via preferred learning methods  8/15/2021 1009 by Valerie Madrid RN  Outcome: Adequate for Discharge  8/15/2021 0931 by Valerie Madrid RN  Outcome: Progressing     Problem: CARDIOVASCULAR - ADULT  Goal: Maintains optimal cardiac output and hemodynamic stability  Description: INTERVENTIONS:  - Monitor I/O, vital signs and rhythm  - Monitor for S/S and trends of decreased cardiac output  - Administer and titrate ordered vasoactive medications to optimize hemodynamic stability  - Assess quality of pulses, skin color and temperature  - Assess for signs of decreased coronary artery perfusion  - Instruct patient to report change in severity of symptoms  8/15/2021 1009 by Valerie Madrid RN  Outcome: Adequate for Discharge  8/15/2021 0931 by Valerie Madrid RN  Outcome: Progressing  Goal: Absence of cardiac dysrhythmias or at baseline rhythm  Description: INTERVENTIONS:  - Continuous cardiac monitoring, vital signs, obtain 12 lead EKG if ordered  - Administer antiarrhythmic and heart rate control medications as ordered  - Monitor electrolytes and administer replacement therapy as ordered  8/15/2021 1009 by Tiffanie Vasquez RN  Outcome: Adequate for Discharge  8/15/2021 0931 by Tiffanie Vasquez RN  Outcome: Progressing

## 2021-08-15 NOTE — PLAN OF CARE
Problem: Potential for Falls  Goal: Patient will remain free of falls  Description: INTERVENTIONS:  - Educate patient/family on patient safety including physical limitations  - Instruct patient to call for assistance with activity   - Consult OT/PT to assist with strengthening/mobility   - Keep Call bell within reach  - Keep bed low and locked with side rails adjusted as appropriate  - Keep care items and personal belongings within reach  - Initiate and maintain comfort rounds  - Make Fall Risk Sign visible to staff  - Offer Toileting every  Hours, in advance of need  - Initiate/Maintain alarm  - Obtain necessary fall risk management equipment:   - Apply yellow socks and bracelet for high fall risk patients  - Consider moving patient to room near nurses station  Outcome: Progressing     Problem: Nutrition/Hydration-ADULT  Goal: Nutrient/Hydration intake appropriate for improving, restoring or maintaining nutritional needs  Description: Monitor and assess patient's nutrition/hydration status for malnutrition  Collaborate with interdisciplinary team and initiate plan and interventions as ordered  Monitor patient's weight and dietary intake as ordered or per policy  Utilize nutrition screening tool and intervene as necessary  Determine patient's food preferences and provide high-protein, high-caloric foods as appropriate       INTERVENTIONS:  - Monitor oral intake, urinary output, labs, and treatment plans  - Assess nutrition and hydration status and recommend course of action  - Evaluate amount of meals eaten  - Assist patient with eating if necessary   - Allow adequate time for meals  - Recommend/ encourage appropriate diets, oral nutritional supplements, and vitamin/mineral supplements  - Order, calculate, and assess calorie counts as needed  - Recommend, monitor, and adjust tube feedings and TPN/PPN based on assessed needs  - Assess need for intravenous fluids  - Provide specific nutrition/hydration education as appropriate  - Include patient/family/caregiver in decisions related to nutrition  Outcome: Progressing     Problem: PAIN - ADULT  Goal: Verbalizes/displays adequate comfort level or baseline comfort level  Description: Interventions:  - Encourage patient to monitor pain and request assistance  - Assess pain using appropriate pain scale  - Administer analgesics based on type and severity of pain and evaluate response  - Implement non-pharmacological measures as appropriate and evaluate response  - Consider cultural and social influences on pain and pain management  - Notify physician/advanced practitioner if interventions unsuccessful or patient reports new pain  Outcome: Progressing     Problem: INFECTION - ADULT  Goal: Absence or prevention of progression during hospitalization  Description: INTERVENTIONS:  - Assess and monitor for signs and symptoms of infection  - Monitor lab/diagnostic results  - Monitor all insertion sites, i e  indwelling lines, tubes, and drains  - Monitor endotracheal if appropriate and nasal secretions for changes in amount and color  - [place] appropriate cooling/warming therapies per order  - Administer medications as ordered  - Instruct and encourage patient and family to use good hand hygiene technique  - Identify and instruct in appropriate isolation precautions for identified infection/condition  Outcome: Progressing  Goal: Absence of fever/infection during neutropenic period  Description: INTERVENTIONS:  - Monitor WBC    Outcome: Progressing     Problem: SAFETY ADULT  Goal: Maintain or return to baseline ADL function  Description: INTERVENTIONS:  -  Assess patient's ability to carry out ADLs; assess patient's baseline for ADL function and identify physical deficits which impact ability to perform ADLs (bathing, care of mouth/teeth, toileting, grooming, dressing, etc )  - Assess/evaluate cause of self-care deficits   - Assess range of motion  - Assess patient's mobility; develop plan if impaired  - Assess patient's need for assistive devices and provide as appropriate  - Encourage maximum independence but intervene and supervise when necessary  - Involve family in performance of ADLs  - Assess for home care needs following discharge   - Consider OT consult to assist with ADL evaluation and planning for discharge  - Provide patient education as appropriate  Outcome: Progressing  Goal: Maintains/Returns to pre admission functional level  Description: INTERVENTIONS:  - Perform BMAT or MOVE assessment daily    - Set and communicate daily mobility goal to care team and patient/family/caregiver  - Collaborate with rehabilitation services on mobility goals if consulted  - Perform Range of Motion  times a day  - Reposition patient every  hours    - Dangle patient  times a day  - Stand patient  times a day  - Ambulate patient  times a day  - Out of bed to chair  times a day   - Out of bed for meals times a day  - Out of bed for toileting  - Record patient progress and toleration of activity level   Outcome: Progressing     Problem: DISCHARGE PLANNING  Goal: Discharge to home or other facility with appropriate resources  Description: INTERVENTIONS:  - Identify barriers to discharge w/patient and caregiver  - Arrange for needed discharge resources and transportation as appropriate  - Identify discharge learning needs (meds, wound care, etc )  - Arrange for interpretive services to assist at discharge as needed  - Refer to Case Management Department for coordinating discharge planning if the patient needs post-hospital services based on physician/advanced practitioner order or complex needs related to functional status, cognitive ability, or social support system  Outcome: Progressing     Problem: Knowledge Deficit  Goal: Patient/family/caregiver demonstrates understanding of disease process, treatment plan, medications, and discharge instructions  Description: Complete learning assessment and assess knowledge base    Interventions:  - Provide teaching at level of understanding  - Provide teaching via preferred learning methods  Outcome: Progressing     Problem: CARDIOVASCULAR - ADULT  Goal: Maintains optimal cardiac output and hemodynamic stability  Description: INTERVENTIONS:  - Monitor I/O, vital signs and rhythm  - Monitor for S/S and trends of decreased cardiac output  - Administer and titrate ordered vasoactive medications to optimize hemodynamic stability  - Assess quality of pulses, skin color and temperature  - Assess for signs of decreased coronary artery perfusion  - Instruct patient to report change in severity of symptoms  Outcome: Progressing  Goal: Absence of cardiac dysrhythmias or at baseline rhythm  Description: INTERVENTIONS:  - Continuous cardiac monitoring, vital signs, obtain 12 lead EKG if ordered  - Administer antiarrhythmic and heart rate control medications as ordered  - Monitor electrolytes and administer replacement therapy as ordered  Outcome: Progressing

## 2021-08-15 NOTE — DISCHARGE SUMMARY
[facility]  Discharge- [facility] 1940, 78 y o  female MRN: [id number]  Unit/Bed#: E4 -01 Encounter: [id number]  Primary Care Provider: Jean Pierre Mathis DO   Date and time admitted to hospital: 8/13/2021  9:37 AM    * Palpitations  Assessment & Plan  49-year-old female with past medical history of proximal AFib, orthostatic hypotension, history of cancer status post head and neck radiation, dysphagia status post PEG tube presented with palpitations  On route via EMS, telemetry strip showed that patient was in atrial fibrillation with heart rates in the [address] [place] [address] cardiology outpatient, unable to tolerate beta-blockers due to hypotension  Continue digoxin every other day, Eliquis  Overnight, on telemetry patient had over 1 hour episodes on of atrial fibrillation, currently in sinus rhythm  Cardiology discussed anti arrhythmics, but patient was hesitant given multiple side effects  Patient's symptoms did improve, and patient was given referral to follow with cardiology outpatient and EP      UTI (urinary tract infection)  Assessment & Plan  UA was significant for bacteria  Urine cultures currently growing Gram-negative rods  Continue Rocephin  Urine cultures grew E coli sensitive to Augmentin  Patient denies any history of penicillin allergy, will discharge patient on Augmentin to complete 7 day course    S/P percutaneous endoscopic gastrostomy (PEG) tube placement [facility])  Assessment & Plan  PEG tube in place  Nutrition consult, appreciate recommendations for diet  Continue PEG feedings TwoCal q 6 hours with 150 cc flushes, okay for full liquid diet    Orthostatic hypotension  Assessment & Plan  Patient was hypotensive this morning, systolic of 28N  Known history of orthostatic hypotension and autonomic dysfunction, will increase midodrine to 5 mg t i d     Paroxysmal atrial fibrillation ([facility] [address] )  Assessment & Plan  As above        Discharging Physician / Practitioner: Miguelina Blanchard James Nieto MD  PCP: Kieran Mattson DO  Admission Date:   Admission Orders (From admission, onward)     Ordered        08/14/21 1055  Inpatient Admission  Once         08/13/21 1239  Place in Observation  Once                   Discharge Date: 08/15/21    Medical Problems     Resolved Problems  Date Reviewed: 8/15/2021    None                Consultations During Hospital Stay:  · Cardiology    Procedures Performed:   · None    Significant Findings / Test Results:   XR chest 1 view portable    Result Date: 8/13/2021  · Impression: Air trapping consistent with COPD No acute cardiopulmonary disease  Findings are stable Workstation performed: [id number]   ·     Incidental Findings:   · None     Test Results Pending at Discharge (will require follow up): · None     Outpatient Tests Requested:  · None    Complications:  None    Reason for Admission: Palpitations    Hospital Course: Misty De Paz is a 78 y o  female patient who originally presented to the hospital on 8/13/2021 due to dizziness and palpitations  Patient history of proximal AFib, orthostatic hypotension and hypothyroidism  Heart rate on done by EMS via telemetry strip showed that she was in the 120s in AFib  Cardiology was consulted, and did monitor overnight  It was found that she went to AFib for greater than 1 hour  She has not been on beta-blockers due to orthostatic hypotension and bradycardia  Cardiology recommended anti arrhythmics but patient was declining due to multiple side effects after reading online  Cardiology recommend the patient to follow up with her previous cardiologist and EP outpatient where her outpatient referral will be made  Subsequently found to have UTI  Has been treated with several antibiotics in the past month failing doxycycline and Keflex  Urine culture did grow E coli sensitive to Augmentin and Bactrim  Given age Augmentin was prescribed  Patient denies any history of penicillin allergies      Please see above list of diagnoses and related plan for additional information  Condition at Discharge: fair     Discharge Day Visit / Exam:     Subjective:  No events overnight  Reports feeling at her baseline  No complaints  Discussed discharge planning  Vitals: Blood Pressure: 146/65 (08/15/21 0700)  Pulse: 77 (08/15/21 0700)  Temperature: 97 8 °F (36 6 °C) (08/15/21 0700)  Temp Source: Temporal (08/15/21 0700)  Respirations: 18 (08/15/21 0700)  Weight - Scale: 53 6 kg (118 lb 2 7 oz) (08/15/21 0600)  SpO2: 98 % (08/15/21 0700)  Exam:   Physical Exam  Vitals and nursing note reviewed  Constitutional:       Appearance: Normal appearance  HENT:      Head: Normocephalic and atraumatic  Eyes:      General: No scleral icterus  Conjunctiva/sclera: Conjunctivae normal    Cardiovascular:      Rate and Rhythm: Normal rate and regular rhythm  Heart sounds: Normal heart sounds  Pulmonary:      Effort: Pulmonary effort is normal       Comments: Decreased breath sounds  Abdominal:      General: Bowel sounds are normal  There is no distension  Palpations: Abdomen is soft  Tenderness: There is no abdominal tenderness  Comments: PEG Tube   Musculoskeletal:         General: No swelling  Right lower leg: No edema  Left lower leg: No edema  Skin:     General: Skin is warm and dry  Neurological:      General: No focal deficit present  Mental Status: She is alert  Mental status is at baseline  Discussion with Family: Patient    Discharge instructions/Information to patient and family:   See after visit summary for information provided to patient and family  Provisions for Follow-Up Care:  See after visit summary for information related to follow-up care and any pertinent home health orders        Disposition:     Home    For Discharges to [facility] SNF:   · Not Applicable to this Patient - Not Applicable to this Patient    Planned Readmission: None     Discharge Statement:  I spent 35 minutes discharging the patient  This time was spent on the day of discharge  I had direct contact with the patient on the day of discharge  Greater than 50% of the total time was spent examining patient, answering all patient questions, arranging and discussing plan of care with patient as well as directly providing post-discharge instructions  Additional time then spent on discharge activities  Discharge Medications:  See after visit summary for reconciled discharge medications provided to patient and family        ** Please Note: This note has been constructed using a voice recognition system **

## 2021-08-15 NOTE — DISCHARGE INSTRUCTIONS
Cardiology office to call to arrange EP follow up, if not referral given to make EP follow up regarding Afib

## 2021-08-15 NOTE — ASSESSMENT & PLAN NOTE
Patient was hypotensive this morning, systolic of 42T  Known history of orthostatic hypotension and autonomic dysfunction, will increase midodrine to 5 mg t i d

## 2021-08-15 NOTE — ASSESSMENT & PLAN NOTE
UA was significant for bacteria  Urine cultures currently growing Gram-negative rods  Continue Rocephin  Urine cultures grew E coli sensitive to Augmentin  Patient denies any history of penicillin allergy, will discharge patient on Augmentin to complete 7 day course

## 2021-08-15 NOTE — ASSESSMENT & PLAN NOTE
78-year-old female with past medical history of proximal AFib, orthostatic hypotension, history of cancer status post head and neck radiation, dysphagia status post PEG tube presented with palpitations  On route via EMS, telemetry strip showed that patient was in atrial fibrillation with heart rates in the [address] [place] [address] cardiology outpatient, unable to tolerate beta-blockers due to hypotension  Continue digoxin every other day, Eliquis  Overnight, on telemetry patient had over 1 hour episodes on of atrial fibrillation, currently in sinus rhythm  Cardiology discussed anti arrhythmics, but patient was hesitant given multiple side effects  Patient's symptoms did improve, and patient was given referral to follow with cardiology outpatient and EP

## 2021-08-15 NOTE — PLAN OF CARE
Problem: Potential for Falls  Goal: Patient will remain free of falls  Description: INTERVENTIONS:  - Educate patient/family on patient safety including physical limitations  - Instruct patient to call for assistance with activity   - Consult OT/PT to assist with strengthening/mobility   - Keep Call bell within reach  - Keep bed low and locked with side rails adjusted as appropriate  - Keep care items and personal belongings within reach  - Initiate and maintain comfort rounds  - Make Fall Risk Sign visible to staff  - Offer Toileting every  Hours, in advance of need  - Initiate/Maintain alarm  - Obtain necessary fall risk management equipment:   - Apply yellow socks and bracelet for high fall risk patients  - Consider moving patient to room near nurses station  Outcome: Progressing     Problem: Nutrition/Hydration-ADULT  Goal: Nutrient/Hydration intake appropriate for improving, restoring or maintaining nutritional needs  Description: Monitor and assess patient's nutrition/hydration status for malnutrition  Collaborate with interdisciplinary team and initiate plan and interventions as ordered  Monitor patient's weight and dietary intake as ordered or per policy  Utilize nutrition screening tool and intervene as necessary  Determine patient's food preferences and provide high-protein, high-caloric foods as appropriate       INTERVENTIONS:  - Monitor oral intake, urinary output, labs, and treatment plans  - Assess nutrition and hydration status and recommend course of action  - Evaluate amount of meals eaten  - Assist patient with eating if necessary   - Allow adequate time for meals  - Recommend/ encourage appropriate diets, oral nutritional supplements, and vitamin/mineral supplements  - Order, calculate, and assess calorie counts as needed  - Recommend, monitor, and adjust tube feedings and TPN/PPN based on assessed needs  - Assess need for intravenous fluids  - Provide specific nutrition/hydration education as appropriate  - Include patient/family/caregiver in decisions related to nutrition  Outcome: Progressing     Problem: PAIN - ADULT  Goal: Verbalizes/displays adequate comfort level or baseline comfort level  Description: Interventions:  - Encourage patient to monitor pain and request assistance  - Assess pain using appropriate pain scale  - Administer analgesics based on type and severity of pain and evaluate response  - Implement non-pharmacological measures as appropriate and evaluate response  - Consider cultural and social influences on pain and pain management  - Notify physician/advanced practitioner if interventions unsuccessful or patient reports new pain  Outcome: Progressing     Problem: INFECTION - ADULT  Goal: Absence or prevention of progression during hospitalization  Description: INTERVENTIONS:  - Assess and monitor for signs and symptoms of infection  - Monitor lab/diagnostic results  - Monitor all insertion sites, i e  indwelling lines, tubes, and drains  - Monitor endotracheal if appropriate and nasal secretions for changes in amount and color  - [place] appropriate cooling/warming therapies per order  - Administer medications as ordered  - Instruct and encourage patient and family to use good hand hygiene technique  - Identify and instruct in appropriate isolation precautions for identified infection/condition  Outcome: Progressing  Goal: Absence of fever/infection during neutropenic period  Description: INTERVENTIONS:  - Monitor WBC    Outcome: Progressing     Problem: SAFETY ADULT  Goal: Maintain or return to baseline ADL function  Description: INTERVENTIONS:  -  Assess patient's ability to carry out ADLs; assess patient's baseline for ADL function and identify physical deficits which impact ability to perform ADLs (bathing, care of mouth/teeth, toileting, grooming, dressing, etc )  - Assess/evaluate cause of self-care deficits   - Assess range of motion  - Assess patient's mobility; develop plan if impaired  - Assess patient's need for assistive devices and provide as appropriate  - Encourage maximum independence but intervene and supervise when necessary  - Involve family in performance of ADLs  - Assess for home care needs following discharge   - Consider OT consult to assist with ADL evaluation and planning for discharge  - Provide patient education as appropriate  Outcome: Progressing  Goal: Maintains/Returns to pre admission functional level  Description: INTERVENTIONS:  - Perform BMAT or MOVE assessment daily    - Set and communicate daily mobility goal to care team and patient/family/caregiver  - Collaborate with rehabilitation services on mobility goals if consulted  - Perform Range of Motion  times a day  - Reposition patient every  hours    - Dangle patient  times a day  - Stand patient  times a day  - Ambulate patient  times a day  - Out of bed to chair  times a day   - Out of bed for meals times a day  - Out of bed for toileting  - Record patient progress and toleration of activity level   Outcome: Progressing     Problem: DISCHARGE PLANNING  Goal: Discharge to home or other facility with appropriate resources  Description: INTERVENTIONS:  - Identify barriers to discharge w/patient and caregiver  - Arrange for needed discharge resources and transportation as appropriate  - Identify discharge learning needs (meds, wound care, etc )  - Arrange for interpretive services to assist at discharge as needed  - Refer to Case Management Department for coordinating discharge planning if the patient needs post-hospital services based on physician/advanced practitioner order or complex needs related to functional status, cognitive ability, or social support system  Outcome: Progressing     Problem: Knowledge Deficit  Goal: Patient/family/caregiver demonstrates understanding of disease process, treatment plan, medications, and discharge instructions  Description: Complete learning assessment and assess knowledge base    Interventions:  - Provide teaching at level of understanding  - Provide teaching via preferred learning methods  Outcome: Progressing     Problem: CARDIOVASCULAR - ADULT  Goal: Maintains optimal cardiac output and hemodynamic stability  Description: INTERVENTIONS:  - Monitor I/O, vital signs and rhythm  - Monitor for S/S and trends of decreased cardiac output  - Administer and titrate ordered vasoactive medications to optimize hemodynamic stability  - Assess quality of pulses, skin color and temperature  - Assess for signs of decreased coronary artery perfusion  - Instruct patient to report change in severity of symptoms  Outcome: Progressing  Goal: Absence of cardiac dysrhythmias or at baseline rhythm  Description: INTERVENTIONS:  - Continuous cardiac monitoring, vital signs, obtain 12 lead EKG if ordered  - Administer antiarrhythmic and heart rate control medications as ordered  - Monitor electrolytes and administer replacement therapy as ordered  Outcome: Progressing

## 2021-08-15 NOTE — PHYSICAL THERAPY NOTE
PHYSICAL THERAPY EVALUATION NOTE          Patient Name: Vicente Frias  NBZNC'V Date: 8/15/2021   PT EVALUATION    78 y o     [id number]    Palpitations [R00 2]  Dizziness [R42]  Paroxysmal atrial fibrillation (HCC) [I48 0]  UTI (urinary tract infection) [N39 0]  History of radiation to head and neck region [Z92 3]    Past Medical History:   Diagnosis Date    Abnormal ECG     Arrhythmia     Asthma     Atrial fibrillation (HCC)     Autonomic dysfunction     Cancer ([address] )     lung, head and neck    Coronary artery disease     Disease of thyroid gland     Dysphagia     Gastroparesis     Heart murmur     mitral and aortic regurg    Hyperlipidemia     Hypertension     Irregular heart beat      Past Surgical History:   Procedure Laterality Date    CARDIAC CATHETERIZATION      CARDIAC LOOP RECORDER      ESOPHAGOGASTRODUODENOSCOPY W/ PEG      HEAD AND NECK DEBRIDEMENT      LUNG SURGERY Left     upper lobectomy    PEG TUBE PLACEMENT  01/15/2021    By Dr Richie Lo  2017    PEG TUBE PLACEMENT  06/2020    Balloon gastrostomy-replaced 3 more times secondary to balloon failure and Balloon falling out        08/15/21 0829   PT Last Visit   PT Visit Date 08/15/21   Note Type   Note type Evaluation   Pain Assessment   Pain Assessment Tool 0-10   Pain Score 8   Pain Location/Orientation Orientation: Mid;Location: Back   Hospital Pain Intervention(s) Repositioned; Ambulation/increased activity; Emotional support; Rest   Home Living   Type of [address] One level;Stairs to enter with rails   Bathroom Shower/Tub Walk-in shower   Bathroom Toilet Raised   Bathroom Equipment Grab bars in shower; Shower chair   Home Equipment Cane   Additional Comments 2 RYAN   Prior Function   Level of [place] Independent with ADLs and functional mobility   Lives With Alone   ADL Assistance Independent   IADLs Independent  (w increased difficulty since T9 fx)   Falls in the last 6 months 0   Vocational Retired   Comments at baseline pt is indep without an AD  +  has been having difficulty w IADLs since her back fx, reports local family but limited support as they have not been helping her despite knowing of her injury  has been going to OPPT for strengthening, balance training and CA related pain/weakness   Restrictions/Precautions   Weight Bearing Precautions Per Order No   Other Precautions Cognitive; Chair Alarm; Bed Alarm; Fall Risk;Pain;Hard of hearing   General   Additional Pertinent History pt admitted 8/13/21 for palpitations  activity as tolerated orders  hx of autonomic dysfunction, CA, and per patient recent T9 fx being treated conservatively  has a PEG tube   Cognition   Overall Cognitive Status Impaired   Arousal/Participation Cooperative   Orientation Level Oriented to person;Oriented to place;Oriented to time   Memory Within functional limits;Decreased recall of precautions   Following Commands Follows one step commands without difficulty   Comments [place] so occasionally requires repeat direction  somewhat of an unreliable historian  appears lonely   RLE Assessment   RLE Assessment WFL  (4/5)   LLE Assessment   LLE Assessment WFL  (4/5)   Coordination   Sensation X  (impaired RLE per patient)   Light Touch   RLE Light Touch Grossly intact   LLE Light Touch Grossly intact   Bed Mobility   Supine to Sit 5  Supervision   Additional items Increased time required;Verbal cues   Sit to Supine 5  Supervision   Additional items Increased time required;Verbal cues   Transfers   Sit to Stand 5  Supervision   Additional items Verbal cues   Stand to Sit 5  Supervision   Additional items Verbal cues   Ambulation/Elevation   Gait pattern Improper Weight shift; Wide OBED; Short stride; Excessively slow  (unsteady)   Gait Assistance 5  Supervision   Additional items Assist x 1   Assistive Device None   Distance 50'   Stair Management Assistance Not tested   Balance   Static Standing Fair   Dynamic Standing [address],4, & 5 -   Activity Tolerance   Activity Tolerance Patient tolerated treatment well;Treatment limited secondary to medical complications (Comment)   Medical Staff Made [address] OT   Nurse Made Aware Neva JOHNSON   Assessment   Prognosis Fair   Problem List Decreased strength; Impaired balance;Decreased mobility; Impaired judgement;Decreased safety awareness; Impaired hearing; Impaired sensation;Decreased skin integrity;Pain;Orthopedic restrictions   Assessment Blessing Hanks is a 78 y o  female admitted to [facility] on 8/13/2021 for Palpitations  PT was consulted and pt was seen on 8/15/2021 for mobility assessment and d/c planning  Pt presents w pain  Reports at baseline indep without an AD  Admits to not doing IADLs since her back fx (states never cooking 2* PEG)  Pt is currently functioning at a supervision assistance  level for bed mobility, transfers and ambulation without an AD household distances  Appears a bit unsteady on her feet but no gross LOB observed  Patient expressed multiple concerns re: not having help at home, having recent difficulty w IADLs and ongoing issues w BLE neuropathy and impaired balance  We did discuss use of OTC ankle brace for support given c/o of L ankle instability  Noted some discrepancies in reported social hx and information gathered from chart review; ? if patient is lonely/ depressed as chart indicated pt spouse recently dc to [facility] following TBI and pt reports limited support systems  Pt will benefit from continued skilled IP PT to address the above mentioned impairments  in order to maximize recovery and increase functional independence when completing mobility and ADLs  At this time PT recommendations for d/c are continue w OPPT as tolerated  Bed alarm engaged end of session      Barriers to Discharge Decreased caregiver support   Barriers to Discharge Comments lives alone, reports limited support   Goals Patient Goals to get more help at home   STG Expiration Date 08/29/21   Short Term Goal #1 1)  Pt will perform bed mobility with [place] demonstrating appropriate technique 100% of the time in order to improve function  2)  Perform all transfers with [place] demonstrating safe and appropriate technique 100% of the time in order to improve ability to negotiate safely in home environment  3) Amb with least restrictive AD > 150'x1 with mod I in order to demonstrate ability to negotiate in home environment  4)  Improve overall strength and balance 1/2 grade in order to optimize ability to perform functional tasks and reduce fall risk  5) Increase activity tolerance to 45 minutes in order to improve endurance to functional tasks  6)  Negotiate stairs using most appropriate technique and S in order to be able to negotiate safely in home environment  7) PT for ongoing patient and family/caregiver education, DME needs and d/c planning in order to promote highest level of function in least restrictive environment  8) Verbalize 3/3 spinal precautions to facilitate safe mobility practices in setting of T9 fx    Plan   Treatment/Interventions Functional transfer training;LE strengthening/ROM; Elevations; Therapeutic exercise; Endurance training;Equipment eval/education;Patient/family training;Bed mobility;Gait training; Compensatory technique education;Spoke to nursing;OT   PT Frequency 2-3x/wk   Recommendation   PT Discharge Recommendation Home with outpatient rehabilitation   PT - OK to Discharge Yes   [address]   Turning in Bed Without Bedrails 4   Lying on Back to Sitting on Edge of Flat Bed 3   Moving Bed to Chair 3   Standing Up From Chair 3   Walk in Room 3   Climb 3-5 Stairs 3   Basic Mobility Inpatient Raw Score 19   Basic Mobility Standardized Score 42 48   History: co - morbidities, age, coping styles, social background, fall risk, cognition, multiple lines  Exam: impairments in systems including musculoskeletal (strength), neuromuscular (balance, gait, transfers, motor function and sensation), joint integrity (?T9 fx), integumentary (skin integrity, presence of scar from popped blood blister posterior R calf), am-pac, cognition  Clinical: unstable/unpredictable  Complexity:high      Esdras Sharan, PT

## 2021-08-17 NOTE — TELEPHONE ENCOUNTER
Received imaging cd from [facility]  Scanned reports into chart  Gave to MA

## 2021-08-19 NOTE — PHYSICIAN ADVISOR
Current patient class: Inpatient  The patient is currently on Hospital Day: 3 at [address] [place] [address]      The patient was admitted to the hospital at 10:55 AM on 8/14/21 for the following diagnosis:  Palpitations [R00 2]  Dizziness [R42]  Paroxysmal atrial fibrillation (HCC) [I48 0]  UTI (urinary tract infection) [N39 0]  History of radiation to head and neck region [Z92 3]       There is documentation in the medical record of an expected length of stay of at least 2 midnights  The patient is therefore expected to satisfy the 2 midnight benchmark and given the 2 midnight presumption is appropriate for INPATIENT ADMISSION  Given this expectation of a satisfying stay, CMS instructs us that the patient is most often appropriate for inpatient admission under part A provided medical necessity is documented in the chart  After review of the relevant documentation, labs, vital signs and test results, the patient is appropriate for INPATIENT ADMISSION  Admission to the hospital as an inpatient is a complex decision making process which requires the practitioner to consider the patients presenting complaint, history and physical examination and all relevant testing  With this in mind, in this case, the patient was deemed appropriate for INPATIENT ADMISSION  After review of the documentation and testing available at the time of the admission I concur with this clinical determination of medical necessity  Rationale is as follows: The patient is a 78 yrs old Female who presented to the ED at 8/13/2021  9:37 AM with a chief complaint of Dizziness (Pt reporst waking up with dizziness  Per EMS after walking patient to ambulance and was put on monitor pt's HR was Afib 181F and BP systolic in 16W  Pt has history of Afib  On arrival pt's vitals are within normal limits and she reports no dizziness   Pt denies CP or SOB  ) Pt with past medical history of proximal AFib, orthostatic hypotension, history of cancer status post head and neck radiation, dysphagia status post PEG tube presented with palpitations  En route by EMS, telemetry strip showed that patient was in atrial fibrillation with heart rates in the 120s  The night after admission,  telemetry patient had over 1 hour episodes on of atrial fibrillation  Cardiology discussed anti arrhythmics, but patient was hesitant given multiple side effects  She was monitored remained stable on her current medication regimen and outpatient eps follow up recommended  Pt declined antiarrhythmics  BP was 80's/40's on 8/14/21 and midodrine increased  She was found to have UTI, UC + ecoli   She was treated with rocephin during her hospital course and was discharged home on hospital day 3            The patients vitals on arrival were   ED Triage Vitals   Temperature Pulse Respirations Blood Pressure SpO2   08/13/21 0943 08/13/21 0943 08/13/21 0943 08/13/21 0943 08/13/21 0943   97 5 °F (36 4 °C) 79 16 111/64 97 %      Temp Source Heart Rate Source Patient Position - Orthostatic VS BP Location FiO2 (%)   08/13/21 0943 08/13/21 0943 08/13/21 0943 08/13/21 0943 --   Oral Monitor Sitting Left arm       Pain Score       08/13/21 1322       5           Past Medical History:   Diagnosis Date    Abnormal ECG     Arrhythmia     Asthma     Atrial fibrillation (HCC)     Autonomic dysfunction     Cancer (HCC)     lung, head and neck    Coronary artery disease     Disease of thyroid gland     Dysphagia     Gastroparesis     Heart murmur     mitral and aortic regurg    Hyperlipidemia     Hypertension     Irregular heart beat      Past Surgical History:   Procedure Laterality Date    CARDIAC CATHETERIZATION      CARDIAC LOOP RECORDER      ESOPHAGOGASTRODUODENOSCOPY W/ PEG      HEAD AND NECK DEBRIDEMENT      LUNG SURGERY Left     upper lobectomy    PEG TUBE PLACEMENT  01/15/2021    By Dr Kem Graham    PEG TUBE PLACEMENT  2017    PEG TUBE PLACEMENT  06/2020    Balloon gastrostomy-replaced 3 more times secondary to balloon failure and Balloon falling out           Consults have been placed to:   IP CONSULT TO CARDIOLOGY  IP CONSULT TO NUTRITION SERVICES    Vitals:    08/15/21 0528 08/15/21 0529 08/15/21 0600 08/15/21 0700   BP: (!) 175/92 104/74  146/65   BP Location: Left arm Left arm  Left arm   Pulse:    77   Resp:    18   Temp:    97 8 °F (36 6 °C)   TempSrc:    Temporal   SpO2:    98%   Weight:   53 6 kg (118 lb 2 7 oz)        Most recent labs:    No results for input(s): WBC, HGB, HCT, PLT, K, NA, CALCIUM, BUN, CREATININE, LIPASE, AMYLASE, INR, TROPONINI, CKTOTAL, AST, ALT, ALKPHOS, BILITOT in the last 72 hours  Scheduled Meds:  Continuous Infusions:No current facility-administered medications for this encounter  PRN Meds:      Surgical procedures (if appropriate):

## 2021-08-23 NOTE — TELEPHONE ENCOUNTER
From patient, states dr Lucila Walker called her yesterday, she only received half of the n=message, please call patient back regarding call to her yesterday  Will give dr Lucila Walker message

## 2021-08-26 NOTE — PATIENT INSTRUCTIONS
Determine sodium intake in tube feeds  Should be consuming at least no less than 2600 mg of sodium in a day via tube feeds, add sodium to water  Compression stockings, spanks, daily exercise lying on the ground Lorazepam (By mouth)   Lorazepam ([id number])  Treats anxiety. Brand Name(s):Ativan, LORazepam Intensol   There may be other brand names for this medicine. When This Medicine Should Not Be Used: This medicine is not right for everyone. Do not use it if you had an allergic reaction to lorazepam or similar medicines, or if you have acute narrow-angle glaucoma. How to Use This Medicine:   Tablet, Liquid  · Take your medicine as directed. Your dose may need to be changed several times to find what works best for you. · Measure the oral liquid medicine with a marked measuring spoon, oral syringe, or medicine cup. · Mix the oral solution with water, juice, soda, applesauce, or pudding. Drink or eat the mixture right away. Do not store it for later use. · Missed dose: Take a dose as soon as you remember. If you are more than 1 hour late, skip the missed dose and wait until it is time for your next dose. Do not use extra medicine to make up for a missed dose. · Tablets: Store the medicine in a closed container at room temperature, away from heat, moisture, and direct light. · Oral solution: Refrigerate the oral solution. Throw away an opened bottle after 90 days. Drugs and Foods to Avoid:   Ask your doctor or pharmacist before using any other medicine, including over-the-counter medicines, vitamins, and herbal products. · Some medicines can affect how lorazepam works. Tell your doctor if you are also using any of the following:   ¨ Theophylline, aminophylline  ¨ Clozapine  ¨ Probenecid  ¨ Valproate  ¨ Medicine to treat seizures  ¨ Medicine to treat depression or mental illness  · Tell your doctor if you use anything else that makes you sleepy. Some examples are allergy medicine, narcotic pain medicine, and alcohol.   Warnings While Using This Medicine:   · Tell your doctor if you are pregnant or breastfeeding, or if you have glaucoma, liver disease, lung problems, or a history of depression, alcohol or drug addiction, or seizures. · This medicine can be habit-forming. Do not use more than your prescribed dose. Call your doctor if you think your medicine is not working. · Do not stop using this medicine suddenly. Your doctor will need to slowly decrease your dose before you stop it completely. · This medicine may make you drowsy. Do not drive or do anything else that could be dangerous until you know how this medicine affects you. · Your doctor will check your progress and the effects of this medicine at regular visits. Keep all appointments. · Keep all medicine out of the reach of children. Never share your medicine with anyone. Possible Side Effects While Using This Medicine:   Call your doctor right away if you notice any of these side effects:  · Depression, confusion, thoughts of hurting yourself  · Severe drowsiness or weakness, slow heartbeat, trouble breathing  If you notice these less serious side effects, talk with your doctor:   · Dizziness, clumsiness  · Unusual tiredness  If you notice other side effects that you think are caused by this medicine, tell your doctor. Call your doctor for medical advice about side effects. You may report side effects to FDA at 5-785-FDA-6996  © 2016 [address] is for End User's use only and may not be sold, redistributed or otherwise used for commercial purposes. The above information is an  only. It is not intended as medical advice for individual conditions or treatments. Talk to your doctor, nurse or pharmacist before following any medical regimen to see if it is safe and effective for you.

## 2021-08-26 NOTE — PROGRESS NOTES
Cardiology Follow Up    Blake Noguera  1940  [id number]  [facility] [place]  [facility]  [address] [facility]   [phone]    1  Orthostatic hypotension  Compression Stocking   2  Paroxysmal atrial fibrillation (HCC)  Compression Stocking       Interval History:   Ms Lalo Noguera was admitted on 8/13 - 8/15/21 to [address] [address] with palpitations  Nitin Barajas experienced palpitations and dizziness  EMS called  On route telemetry strips showed atrial fibrillation with  beats per minute  Cardiology was consulted  Atrial fibrillation resolved spontaneously  She remained off beta-blocker due to orthostatic hypotension and bradycardia  Cardiology recommended anti arrhythmics but she declined  It was recommended she follow up with electrophysiology as an outpatient  Subsequently was found to have aUTI,  Urine culture grew E coli sensitive to Augmentin and Bactrim  Augmentin was prescribed  Afshin Roberts was discharged home  Ms Lalo Noguera  Was admitted to [address] [place] [address] [address] on 8/20/21 - 8/23/21 with near syncope and Orthostatic hypotension  She presented to  [facility] with generalized weakness and the feeling she may pass out  She was found to have orthostatic hypotension despite midodrine  Midodrine increased from 5mg TID to 7 5 mg t i d  She was encouraged to increase sodium intake fluid intake abdominal binder and compression stockings  Free water flushes were increased with tube feeds  Refused Florinef  She was discharged home  Ms Yohan Pierce presents to our office for a recent hospitalization follow up visit  She is accompanied by her son  Afshin Roberts is not wearing true compression stocking or abdominal binder  She is sprinkling salt into free water per PEG  According to Afshin Roberts she has a poor quality of life  She is occasionally off balance  She denies recent lightheadedness or dizziness  Rafael is complaining of increased urination at night        HPI:  Paroxysmal atrial fibrillation on Eliquis 2 5mg BID for stroke prevention   Loop recorder in place 8/22/21 interrogation showed   Sinus tachycardia with PVCs however cannot exclude atrial tachycardia due to P-wave morphology and abnormalities g atrial fibrillation cannot be excluded  Hypertension  Mixed Hyperlipidemia   PVC's  CAD [facility] 2/15 60% LAD disease  Lung CA stage 1B, 1/2013 sp Left upper lobectomy  MGUS  Hx of dysphagia and gastroparesis,   PEG tube 2 shelley HN   3/16/21 TTE: LVEF 60%, Grade 1 DD, mild MR, mild AI, Mild TR, PAP 25mmHg    Allergies Methylprednisolone and Albuterol  Patient Active Problem List   Diagnosis    Paroxysmal atrial fibrillation (HCC)    History of lung cancer in adulthood    Supraventricular tachycardia ([address] )    Centrilobular emphysema ([address] )    History of lobectomy of lung    History of head and neck radiation    Moderate protein-calorie malnutrition (HCC)    Orthostatic hypotension    Coronary artery disease involving native coronary artery of native heart without angina pectoris    Mitral and aortic valve regurgitation    Diastolic dysfunction without heart failure    Pericardial effusion    S/P percutaneous endoscopic gastrostomy (PEG) tube placement ([address] )    Dysphagia    Encounter for care related to feeding tube    Shortness of breath    Nonrheumatic tricuspid valve regurgitation    Voltage-gated potassium channel (VGKC) antibody positive    Lumbar degenerative disc disease    Peripheral polyneuropathy    History of radiation to head and neck region    Autonomic dysfunction    Palpitations    UTI (urinary tract infection)    Numbness in right leg    Episodic lightheadedness     Past Medical History:   Diagnosis Date    Abnormal ECG     Arrhythmia     Asthma     Atrial fibrillation (HCC)     Autonomic dysfunction     Cancer (HCC)     lung, head and neck    Coronary artery disease  Disease of thyroid gland     Dysphagia     Gastroparesis     Heart murmur     mitral and aortic regurg    Hyperlipidemia     Hypertension     Irregular heart beat      Social History     Socioeconomic History    Marital status: Unknown     Spouse name: Not on file    Number of children: Not on file    Years of education: Not on file    Highest education level: Not on file   Occupational History    Occupation: retired - /   Tobacco Use    Smoking status: Former Smoker     Packs/day: 0 50     Years: 20 00     Pack years: 10 00    Smokeless tobacco: Never Used    Tobacco comment: Quit at age 41   Vaping Use    Vaping Use: Never used   Substance and Sexual Activity    Alcohol use: Not Currently    Drug use: Never    Sexual activity: Not on file   Other Topics Concern    Not on file   Social History Narrative    Not on file     Social Determinants of Health     Financial Resource Strain:     Difficulty of Paying Living Expenses:    Food Insecurity:     Worried About [address] in the Last Year:     [address] in the Last Year:    Transportation Needs:     Lack of Transportation (Medical):      Lack of Transportation (Non-Medical):    Physical Activity:     Days of Exercise per Week:     Minutes of Exercise per Session:    Stress:     Feeling of Stress :    Social Connections:     Frequency of Communication with Friends and Family:     Frequency of Social Gatherings with Friends and Family:     Attends Anabaptism Services:     Active Member of Clubs or Organizations:     Attends Club or Organization Meetings:     Marital Status:    Intimate Partner Violence:     Fear of Current or Ex-Partner:     Emotionally Abused:     Physically Abused:     Sexually Abused:       Family History   Problem Relation Age of Onset    Lung cancer Sister      Past Surgical History:   Procedure Laterality Date   [address]  ESOPHAGOGASTRODUODENOSCOPY W/ PEG      HEAD AND NECK DEBRIDEMENT      LUNG SURGERY Left     upper lobectomy    PEG TUBE PLACEMENT  01/15/2021    By Dr Sita Grayson  2017    PEG TUBE PLACEMENT  06/2020    Balloon gastrostomy-replaced 3 more times secondary to balloon failure and Balloon falling out       Current Outpatient Medications:     apixaban (ELIQUIS) 2 5 mg, Take 1 tablet (2 5 mg total) by mouth 2 (two) times a day, Disp: 180 tablet, Rfl: 0    atorvastatin (LIPITOR) 20 mg tablet, atorvastatin 20 mg tablet, Disp: , Rfl:     digoxin (LANOXIN) 0 125 mg tablet, Take 1 tablet (125 mcg total) by mouth every other day, Disp: 45 tablet, Rfl: 3    levothyroxine 112 mcg tablet, Take 112 mcg by mouth daily 100mg daily, Disp: , Rfl:     midodrine (PROAMATINE) 5 mg tablet, Take 1 tablet (5 mg total) by mouth 3 (three) times a day, Disp: 90 tablet, Rfl: 0  Allergies   Allergen Reactions    Albuterol Other (See Comments)     Other reaction(s): Other (See Comments)  Afib  Causes afib      Methylprednisolone Other (See Comments)     Dizziness   Patient states adverse effect, "i feel weird in the head"       Labs:  Admission on 08/13/2021, Discharged on 08/15/2021   Component Date Value    WBC 08/13/2021 8 85     RBC 08/13/2021 3 85     Hemoglobin 08/13/2021 12 5     Hematocrit 08/13/2021 39 3     MCV 08/13/2021 102*    MCH 08/13/2021 32 5     MCHC 08/13/2021 31 8     RDW 08/13/2021 12 7     MPV 08/13/2021 10 2     Platelets 79/79/6177 265     nRBC 08/13/2021 0     Neutrophils Relative 08/13/2021 71     Immat GRANS % 08/13/2021 0     Lymphocytes Relative 08/13/2021 15     Monocytes Relative 08/13/2021 10     Eosinophils Relative 08/13/2021 3     Basophils Relative 08/13/2021 1     Neutrophils Absolute 08/13/2021 6 36     Immature Grans Absolute 08/13/2021 0 02     Lymphocytes Absolute 08/13/2021 1 32     Monocytes Absolute 08/13/2021 0 87     Eosinophils Absolute 08/13/2021 0 23     Basophils Absolute 08/13/2021 0 05     Protime 08/13/2021 15 0*    INR 08/13/2021 1 21*    PTT 08/13/2021 31     Sodium 08/13/2021 140     Potassium 08/13/2021 4 2     Chloride 08/13/2021 102     CO2 08/13/2021 27     ANION GAP 08/13/2021 11     BUN 08/13/2021 32*    Creatinine 08/13/2021 0 84     Glucose 08/13/2021 97     Calcium 08/13/2021 9 6     AST 08/13/2021 28     ALT 08/13/2021 6*    Alkaline Phosphatase 08/13/2021 107     Total Protein 08/13/2021 7 3     Albumin 08/13/2021 3 7     Total Bilirubin 08/13/2021 0 43     eGFR 08/13/2021 64     TSH 3RD GENERATON 08/13/2021 0 917     Magnesium 08/13/2021 2 5     Troponin I 08/13/2021 <0 02     NT-proBNP 08/13/2021 1,119*    Digoxin Lvl 08/13/2021 0 9     Ventricular Rate 08/13/2021 105     Atrial Rate 08/13/2021 74     OH Interval 08/13/2021 160     QRSD Interval 08/13/2021 68     QT Interval 08/13/2021 364     QTC Interval 08/13/2021 481     P Axis 08/13/2021 68     QRS Axis 08/13/2021 9     T Wave Axis 08/13/2021 34     Color, UA 08/13/2021 Yellow     Clarity, UA 08/13/2021 Cloudy     Specific Gravity, UA 08/13/2021 1 010     pH, UA 08/13/2021 6 5     Leukocytes, UA 08/13/2021 Large*    Nitrite, UA 08/13/2021 Positive*    Protein, UA 08/13/2021 Trace*    Glucose, UA 08/13/2021 Negative     Ketones, UA 08/13/2021 Negative     Urobilinogen, UA 08/13/2021 0 2     Bilirubin, UA 08/13/2021 Negative     Blood, UA 08/13/2021 Small*    RBC, UA 08/13/2021 None Seen     WBC, UA 08/13/2021 Innumerable*    Epithelial Cells 08/13/2021 Occasional     Bacteria, UA 08/13/2021 Innumerable*    Urine Culture 08/13/2021 >100,000 cfu/ml Escherichia coli*    Sodium 08/14/2021 138     Potassium 08/14/2021 4 5     Chloride 08/14/2021 103     CO2 08/14/2021 29     ANION GAP 08/14/2021 6     BUN 08/14/2021 35*    Creatinine 08/14/2021 0 86     Glucose 08/14/2021 93     Glucose, Fasting 08/14/2021 93     Calcium 08/14/2021 9 2     eGFR 08/14/2021 63     WBC 08/14/2021 6 62     RBC 08/14/2021 3 51*    Hemoglobin 08/14/2021 11 4*    Hematocrit 08/14/2021 35 4     MCV 08/14/2021 101*    MCH 08/14/2021 32 5     MCHC 08/14/2021 32 2     RDW 08/14/2021 12 6     Platelets 30/37/9681 258     MPV 08/14/2021 10 4      Imaging: XR chest 1 view portable    Result Date: 8/13/2021  Narrative: CHEST INDICATION:   Palpitations  COMPARISON:  3/15/2021 EXAM PERFORMED/VIEWS:  XR CHEST PORTABLE Single view FINDINGS: Cardiomediastinal silhouette appears unremarkable  The lungs are clear  No pneumothorax or pleural effusion  Osseous structures appear within normal limits for patient age  Old lateral right rib healed fracture deformity     Impression: Air trapping consistent with COPD No acute cardiopulmonary disease  Findings are stable Workstation performed: [id number]     MRI lumbar spine w wo contrast    Result Date: 8/18/2021  Narrative: 1 2 [id number]  2 401 2 [id number]    Cardiac EP device report    Result Date: 8/19/2021  Narrative: NON-BILLABLE  [facility] TRANSMISSION: ALERT/SYMPTOM  SPOKE TO PT C/O NEAR FAINTING SPELLS, LIGHTHEADED  SHE FEELING OFF ALL MORNING AND HER HEART WAS RACING  EGRAM SHOWS SR/ST W / PVC  DL     Cardiac EP device report    Result Date: 8/11/2021  Narrative: NON-BILLABLE  [employer] TRANSMISSION: ALERT/SYMPTOMS  SPOKE TO PT C/O SITTING IN CHAIR SUDDENLY FELT FAINT AND HAD A FAST HEART RATE LASTING APPROX  1 MIN  ALSO STATED HER BP WAS GOOD  EGRAM SHOWING SR/ST W/ PVCs  DL     Cardiac EP device report    Result Date: 8/10/2021  Narrative: [facility] TRANSMISSION: PT  INITIATED RE: SYMPTOMS OF PALPITATIONS & SHORTNESS OF BREATH AROUND NOON  NO EPISODES  NORMAL DEVICE FUNCTION  NEXT SCHEDULED TRANSMISSION IS 10/11/21  PL     Cardiac EP device report    Result Date: 8/9/2021  Narrative: NON-BILLABLE  [employer]EL[employer] TRANSMISSION: ALERT/SYMPTOM   SPOKE TO PT C/O GOT UP QUICKLY FROM HER CHAIR FELT LIKE HER BP DROPPED AND FELT HER HEART FLIP FLOPPING  TOOK BP AND IT WAS LOW  EGRAM SHOWS ST W/ PVCs @ 100 BPM  DL       Review of Systems:  Review of Systems   Constitutional: Positive for fatigue  Cardiovascular: Positive for palpitations  Musculoskeletal:        Off balance    Neurological: Positive for weakness  All other systems reviewed and are negative  Physical Exam:  Physical Exam  Vitals reviewed  Constitutional:       Appearance: Normal appearance  HENT:      Head: Normocephalic  Eyes:      Pupils: Pupils are equal, round, and reactive to light  Cardiovascular:      Rate and Rhythm: Normal rate and regular rhythm  Pulses: Normal pulses  Heart sounds: Normal heart sounds  Pulmonary:      Effort: Pulmonary effort is normal       Breath sounds: Normal breath sounds  Abdominal:      General: Bowel sounds are normal       Palpations: Abdomen is soft  Musculoskeletal:         General: Normal range of motion  Cervical back: Normal range of motion and neck supple  Right lower leg: No edema  Left lower leg: No edema  Skin:     General: Skin is warm and dry  Capillary Refill: Capillary refill takes less than 2 seconds  Neurological:      General: No focal deficit present  Mental Status: She is alert and oriented to person, place, and time  Psychiatric:         Mood and Affect: Mood normal          Behavior: Behavior normal          Discussion/Summary:  1  Orthostatic hypotension-  Continue midodrine  7 5 mg t i d , encouraged compression stockings 20-30 mm Hg of compression wear daily remove at bedtime,  Refusing abdominal binder  Encouraged to wear tighter spanks to upper abdomen, increase sodium intake to ensure 2600mg sodium daily in free water  Encouraged daily exercise lying down such as bridges and leg lifts      2  Paroxysmal atrial fibrillation not on AV nataly blockers due to orthostatic hypotension, continue on Eliquis 2 5mg BID for stroke prevention, follow up with Dr Karsten Bateman next week    3   Loop recorder in place 8/22/21 interrogation showed   Sinus tachycardia with PVCs however cannot exclude atrial tachycardia due to P-wave morphology and abnormalities  atrial fibrillation cannot be excluded

## 2021-08-31 NOTE — PROGRESS NOTES
[facility] [address]     Outpatient New Consult   Today's Date: 08/31/21        Patient name: Azra Martines  YOB: 1940  Sex: female         Chief Complaint: Afib, orthostatic hypotension    ASSESSMENT:  Problem List Items Addressed This Visit        Cardiovascular and Mediastinum    Paroxysmal atrial fibrillation ([address] )    Relevant Orders    POCT ECG      Other Visit Diagnoses     Supraventricular arrhythmia        Near syncope            79 yo female  1) Paroxsymal afib, low burden <1% but did have admission for <2 hrs afib Aug 13th  [facility] 3, on Eliquis 2 5mg bid  On Digoxin rate control only because of hyptension  Loop shows asymptomatic episodes in April and May about 1 hour each    2) Palpiations related to PVC and sinus tachycardia 100bpm onloop    3) Orthostatic hypotension midodrine, very labile, never syncopizes fully, needs to sit down  Now on salt and compression stockings    4) H/o head neck CA and radiation, has  Feeding tube  Takes salt w tube feeds recently  PLAN:  1  Afib burden is quite low, so prefer not to start antiarrhythmic  PVC's are benign, and I reassured her, symptoms fortunately sporadic and tolerated  2> Orthostatic hypotension has not responded to midodrine and nonivasive measures  Will try Nothera instead  3> cont Eliquis 2 5mg bid weight/age dose  Follow up in:6 months    Orders Placed This Encounter   Procedures    POCT ECG     There are no discontinued medications    HPI/Subjective:   79 yo female  1) Paroxsymal afib, low burden <1% but did have admission for <2 hrs afib Aug 13th  [facility] 3, on Eliquis 2 5mg bid  On Digoxin rate control only because of hyptension    Loop shows asymptomatic episodes in April and May about 1 hour each    2) Palpiations related to PVC and sinus tachycardia 100bpm onloop    3) Orthostatic hypotension midodrine, very labile, never syncopizes fully, needs to sit down  Now on salt and compression stockings    4) H/o head neck CA and radiation, has  Feeding tube  Takes salt w tube feeds recently  Denis Wells is 79 yo female, she comes w friend today  She was in hospital 2 weeks ago [facility] for afib that broke spontaneously, interogatino of loop showed afib <2 hours  She can't take rate control other than digoxin due to her orthostatic hypotension  She Has normal EF  She also has COPD  No edema  She is has severe orthostatic hypotension and gets severe dizzy spells, needs to sit, no LOC  Takes midodrine but doesn't help much  BP runs very     Please note HPI is listed by problem with with update following it, it is copied again in the assessment above and reflects medical decision making as well  Complete 12 point ROS reviewed and otherwise non pertinent or negative except as per HPI pertinent positives in Cardiovascular and Respiratory emphasized  Please see paper chart for outpatient clinic patients where the patient completed the 12 point ROS survey  Past Medical History:   Diagnosis Date    Abnormal ECG     Arrhythmia     Asthma     Atrial fibrillation (HCC)     Autonomic dysfunction     Cancer (HCC)     lung, head and neck    Coronary artery disease     Disease of thyroid gland     Dysphagia     Gastroparesis     Heart murmur     mitral and aortic regurg    Hyperlipidemia     Hypertension     Irregular heart beat        Allergies   Allergen Reactions    Albuterol Other (See Comments)     Other reaction(s): Other (See Comments)  Afib  Causes afib      Methylprednisolone Other (See Comments)     Dizziness  Patient states adverse effect, "i feel weird in the head"     I reviewed the Home Medication list and Allergies in the chart     Scheduled Meds:  Current Outpatient Medications   Medication Sig Dispense Refill  apixaban (ELIQUIS) 2 5 mg Take 1 tablet (2 5 mg total) by mouth 2 (two) times a day 180 tablet 0    Ascorbic Acid (vitamin C) 1000 MG tablet Take 2,000 mg by mouth daily      atorvastatin (LIPITOR) 20 mg tablet atorvastatin 20 mg tablet      digoxin (LANOXIN) 0 125 mg tablet Take 1 tablet (125 mcg total) by mouth every other day 45 tablet 3    levothyroxine 112 mcg tablet Take 112 mcg by mouth daily       midodrine (PROAMATINE) 5 mg tablet Take 1 tablet (5 mg total) by mouth 3 (three) times a day (Patient taking differently: Take 7 5 mg by mouth 3 (three) times a day ) 90 tablet 0    multivitamin (THERAGRAN) TABS Take 1 tablet by mouth daily      Probiotic Product (PROBIOTIC DAILY PO) Take 1 tablet by mouth daily       No current facility-administered medications for this visit  PRN Meds:         Family History   Problem Relation Age of Onset    Lung cancer Sister        Social History     Socioeconomic History    Marital status: Unknown     Spouse name: Not on file    Number of children: Not on file    Years of education: Not on file    Highest education level: Not on file   Occupational History    Occupation: retired - /   Tobacco Use    Smoking status: Former Smoker     Packs/day: 0 50     Years: 20 00     Pack years: 10 00    Smokeless tobacco: Never Used    Tobacco comment: Quit at age 41   Vaping Use    Vaping Use: Never used   Substance and Sexual Activity    Alcohol use: Not Currently    Drug use: Never    Sexual activity: Not on file   Other Topics Concern    Not on file   Social History Narrative    Not on file     Social Determinants of Health     Financial Resource Strain:     Difficulty of Paying Living Expenses:    Food Insecurity:     Worried About [address] [employer] in the Last Year:     [address] in the Last Year:    Transportation Needs:     Lack of Transportation (Medical):      Lack of Transportation (Non-Medical):    Physical Activity:     Days of Exercise per Week:     Minutes of Exercise per Session:    Stress:     Feeling of Stress :    Social Connections:     Frequency of Communication with Friends and Family:     Frequency of Social Gatherings with Friends and Family:     Attends Buddhist Services:     Active Member of Clubs or Organizations:     Attends Club or Organization Meetings:     Marital Status:    Intimate Partner Violence:     Fear of Current or Ex-Partner:     Emotionally Abused:     Physically Abused:     Sexually Abused:          OBJECTIVE:    /88 (BP Location: Left arm, Patient Position: Sitting, Cuff Size: Adult)   Pulse 79   Ht 5' 5" (1 651 m)   Wt 53 8 kg (118 lb 9 6 oz)   BMI 19 74 kg/m²   Vitals:    08/31/21 1204   Weight: 53 8 kg (118 lb 9 6 oz)     GEN: No acute distress, Alert and oriented, well appearing  HEENT:External ears normal, wearing a mask  EYES: Pupils equal, sclera anicteric, midline, normal conjuctiva  NECK: No JVD, supple, no obvious masses or thryomegaly or goiter  CARDIOVASCULAR:  RRR, No murmur, rub, gallops S1,S2  LUNGS: Clear To auscultation bilaterally, normal effort, no rales, rhonchi, crackles   ABDOMEN:  nondistended,  without obvious organomegaly or ascites  EXTREMITIES/VASCULAR:  No edema  warm an well perfused  PSYCH: Normal Affect,  linear speech pattern without evidence of psychosis  NEURO: Grossly intact, moving all extremiteis equal, face symmetric, alert and responsive, no obvious focal defecits   GAIT:  Ambulates normally without difficulty  HEME: No bleeding, bruising, petechia, purpura   SKIN: No significant rashes on visibile skin, warm, no diaphoresis or pallor       Lab Results:       LABS:      Chemistry        Component Value Date/Time    K 4 5 08/14/2021 0602     08/14/2021 0602    CO2 29 08/14/2021 0602    BUN 35 (H) 08/14/2021 0602    CREATININE 0 86 08/14/2021 0602        Component Value Date/Time    CALCIUM 9 2 08/14/2021 0602 ALKPHOS 107 08/13/2021 1005    AST 28 08/13/2021 1005    ALT 6 (L) 08/13/2021 1005            No results found for: CHOL  No results found for: HDL  No results found for: LDLCALC  No results found for: TRIG  No results found for: CHOLHDL    IMAGING: XR chest 1 view portable    Result Date: 8/13/2021  Narrative: CHEST INDICATION:   Palpitations  COMPARISON:  3/15/2021 EXAM PERFORMED/VIEWS:  XR CHEST PORTABLE Single view FINDINGS: Cardiomediastinal silhouette appears unremarkable  The lungs are clear  No pneumothorax or pleural effusion  Osseous structures appear within normal limits for patient age  Old lateral right rib healed fracture deformity     Impression: Air trapping consistent with COPD No acute cardiopulmonary disease  Findings are stable Workstation performed: [id number]     MRI lumbar spine w wo contrast    Result Date: 8/18/2021  Narrative: 1 2 [id number]  2 401 2 [id number]    Cardiac EP device report    Result Date: 8/29/2021  Narrative: NON-BILLABLE  CARELINK TRANSMISSION: ALERT/SYMPTOM  SPOKE TO PT SHE VARGAS SNOT RECALL WHY SHE ACTIVATED DEVICE  EGRAM SHOWS SR  DL     Cardiac EP device report    Result Date: 8/19/2021  Narrative: NON-BILLABLE  CARELINK TRANSMISSION: ALERT/SYMPTOM  SPOKE TO PT C/O NEAR FAINTING SPELLS, LIGHTHEADED  SHE FEELING OFF ALL MORNING AND HER HEART WAS RACING  EGRAM SHOWS SR/ST W / PVC  DL     Cardiac EP device report    Result Date: 8/11/2021  Narrative: NON-BILLABLE  CARELINK TRANSMISSION: ALERT/SYMPTOMS  SPOKE TO PT C/O SITTING IN CHAIR SUDDENLY FELT FAINT AND HAD A FAST HEART RATE LASTING APPROX  1 MIN  ALSO STATED HER BP WAS GOOD  EGRAM SHOWING SR/ST W/ PVCs  DL     Cardiac EP device report    Result Date: 8/10/2021  Narrative: Sydney Issa TRANSMISSION: PT  INITIATED RE: SYMPTOMS OF PALPITATIONS & SHORTNESS OF BREATH AROUND NOON  NO EPISODES  NORMAL DEVICE FUNCTION  NEXT SCHEDULED TRANSMISSION IS 10/11/21   PL     Cardiac EP device report    Result Date: 2021  Narrative: NON-BILLABLE  CARELINK TRANSMISSION: ALERT/SYMPTOM  SPOKE TO PT C/O GOT UP QUICKLY FROM HER CHAIR FELT LIKE HER BP DROPPED AND FELT HER HEART FLIP FLOPPING  TOOK BP AND IT WAS LOW  EGRAM SHOWS ST W/ PVCs @ 100 BPM  DL        Cardiac testing:   Results for orders placed during the hospital encounter of 03/15/21    Echo complete with contrast if indicated    Narrative  [address] [facility]  [facility], [address]  [phone]    Transthoracic Echocardiogram    Study date:  16-Mar-2021    Patient: Emily Helm  MR number: [id number]  Account number: [de-identified]  : 1940  Age: 78 years  Gender: Female  Status: Outpatient  Location: Bedside  Height: 65 in  Weight: 117 7 lb  BP: 136/ 71 mmHg    Indications: abnormal heart sound  Diagnoses: R01 1 - Cardiac murmur, unspecified    Sonographer:  [facility]  Primary Physician:  Delfina Kulkarni DO  Referring Physician:  Ann Marie Horner Do  Group:  [facility]  Interpreting Physician:  ACE Max     SUMMARY    LEFT VENTRICLE:  Systolic function was normal by visual assessment  Ejection fraction was estimated to be 60 %  There were no regional wall motion abnormalities  Doppler parameters were consistent with abnormal left ventricular relaxation (grade 1 diastolic dysfunction)  MITRAL VALVE:  There was mild regurgitation  AORTIC VALVE:  There was mild regurgitation  AI  ms  TRICUSPID VALVE:  There was mild regurgitation  Estimated peak PA pressure was 25 mmHg  PULMONIC VALVE:  There was trace regurgitation  IVC, HEPATIC VEINS:  Respirophasic changes were normal  Estimated right atrial pressure of 3 mmHg, Estimated PASP 25 mmHg, Estimated PCWP of 11 4 mmHg (by E/e')  PERICARDIUM:  A small pericardial effusion was identified circumferential to the heart  There was no evidence of hemodynamic compromise      [address] measurements:  Unspecified Anatomy:   AR Dec [place] was 1 9 m/s2  AR Dec Time was 1931 8 ms  AR PHT was 560 2 ms  AR Vmax was 3 7 m/s  AR maxPG was 54 mmHg  MV VTI was 18 3 cm  MV Vmax was 1 1 m/s  MV Vmean was 0 5 m/s  MV maxPG was 4 8 mmHg  MV  meanPG was 1 2 mmHg  PRend PG was 5 mmHg  PRend Vmax was 1 1 m/s  TR Vmax was 2 3 m/s  TR maxPG was 21 9 mmHg  MM measurements:  Unspecified Anatomy:   TAPSE was 3 4 cm  PW measurements:  Unspecified Anatomy:   E' Avg was 0 1 m/s  E' Lat was 0 1 m/s  E' Sept was 0 1 m/s  E/E' Avg was 7 3   E/E' Lat was 8 3   E/E' Sept was 6 5   LVOT Env  Ti was 284 5 ms  LVOT VTI was 16 2 cm  LVOT Vmax was 0 8 m/s  LVOT Vmean was  0 6 m/s  LVOT maxPG was 2 9 mmHg  LVOT meanPG was 1 5 mmHg  LVSI Dopp was 47 6 ml/m2  LVSV Dopp was 75 2 ml   MV A Yaya was 0 8 m/s  MV Dec [place] was 1 7 m/s2  MV DecT was 295 9 ms   MV E Yaya was 0 4 m/s  MV E/A Ratio was 0 5   MVA  (VTI) was 4 1 cm2   TV E' lat was 0 1 m/s  HISTORY: PRIOR HISTORY: shortness of breath, coronary artery disease, arrhythmia, atrial fibrillation, cardiac murmur, hyperlipidemia, hypertension  PROCEDURE: The procedure was performed at the bedside  This was a stat study  The transthoracic approach was used  The heart rate was 79 bpm, at the start of the study  Images were obtained from the parasternal, apical, subcostal, and  suprasternal notch acoustic windows  Image quality was adequate  LEFT VENTRICLE: Size was normal  Systolic function was normal by visual assessment  Ejection fraction was estimated to be 60 %  There were no regional wall motion abnormalities  Wall thickness was normal  DOPPLER: Doppler parameters were  consistent with abnormal left ventricular relaxation (grade 1 diastolic dysfunction)      RIGHT VENTRICLE: The size was normal  Systolic function was normal  Wall thickness was normal     LEFT ATRIUM: Size was normal     RIGHT ATRIUM: Size was normal     MITRAL VALVE: Valve structure was normal  There was normal leaflet separation  Not well visualized  DOPPLER: The transmitral velocity was within the normal range  There was no evidence for stenosis  There was mild regurgitation  AORTIC VALVE: The valve was trileaflet  Leaflets exhibited normal thickness, mild calcification, and normal cuspal separation  DOPPLER: Transaortic velocity was within the normal range  There was no evidence for stenosis  There was mild  regurgitation  AI  ms  The regurgitant jet was directed centrally  TRICUSPID VALVE: The valve structure was normal  There was normal leaflet separation  DOPPLER: The transtricuspid velocity was within the normal range  There was no evidence for stenosis  There was mild regurgitation  Estimated peak PA  pressure was 25 mmHg  PULMONIC VALVE: Leaflets exhibited normal thickness, no calcification, and normal cuspal separation  DOPPLER: The transpulmonic velocity was within the normal range  There was trace regurgitation  PERICARDIUM: A small pericardial effusion was identified circumferential to the heart  There was no evidence of hemodynamic compromise  AORTA: The root exhibited normal size  SYSTEMIC VEINS: IVC: The inferior vena cava was normal in size and course  Respirophasic changes were normal  Estimated right atrial pressure of 3 mmHg, Estimated PASP 25 mmHg, Estimated PCWP of 11 4 mmHg (by E/e')      SYSTEM MEASUREMENT TABLES    2D  %FS: 37 4 %  Ao Diam: 2 9 cm  EDV(Teich): 42 1 ml  EF(Teich): 68 8 %  ESV(Teich): 13 1 ml  IVSd: 0 9 cm  LA Diam: 2 6 cm  LAAs A2C: 10 9 cm2  LAAs A4C: 13 8 cm2  LAESV A-L A2C: 22 3 ml  LAESV A-L A4C: 39 5 ml  LAESV Index (A-L): 19 7 ml/m2  LAESV MOD A2C: 20 4 ml  LAESV MOD A4C: 31 4 ml  LAESV(A-L): 31 1 ml  LAESV(MOD BP): 26 4 ml  LAESVInd MOD BP: 16 7 ml/m2  LALs A2C: 4 5 cm  LALs A4C: 4 1 cm  LVEDV MOD A4C: 53 7 ml  LVEF MOD A4C: 62 6 %  LVESV MOD A4C: 20 1 ml  LVIDd: 3 2 cm  LVIDs: 2 cm  LVLd A4C: 7 1 cm  LVLs A4C: 5 8 cm  LVOT Diam: 2 4 cm  LVPWd: 0 9 cm  RA Major: 5 3 cm  RA Minor: 3 cm  RV Major: 7 2 cm  RV Minor: 1 8 cm  RV base: 2 5 cm  RVIDd: 2 5 cm  SV MOD A4C: 33 6 ml  SV(Teich): 29 ml    CW  AR Dec [place]: 1 9 m/s2  AR Dec Time: 1931 8 ms  AR PHT: 560 2 ms  AR Vmax: 3 7 m/s  AR maxP mmHg  MV VTI: 18 3 cm  MV Vmax: 1 1 m/s  MV Vmean: 0 5 m/s  MV maxP 8 mmHg  MV meanP 2 mmHg  PRend P mmHg  PRend Vmax: 1 1 m/s  TR Vmax: 2 3 m/s  TR maxP 9 mmHg    MM  TAPSE: 3 4 cm    PW  E' Av 1 m/s  E' Lat: 0 1 m/s  E' Sept: 0 1 m/s  E/E' Av 3  E/E' Lat: 8 3  E/E' Sept: 6 5  LVOT Env  Ti: 284 5 ms  LVOT VTI: 16 2 cm  LVOT Vmax: 0 8 m/s  LVOT Vmean: 0 6 m/s  LVOT maxP 9 mmHg  LVOT meanP 5 mmHg  LVSI Dopp: 47 6 ml/m2  LVSV Dopp: 75 2 ml  MV A Yaya: 0 8 m/s  MV Dec [place]: 1 7 m/s2  MV DecT: 295 9 ms  MV E Yaya: 0 4 m/s  MV E/A Ratio: 0 5  MVA (VTI): 4 1 cm2  TV E' lat: 0 1 m/s    [employer] Accredited Echocardiography Laboratory    Prepared and electronically signed by    ACE Krishnan  Signed 16-Mar-2021 10:34:57    No results found for this or any previous visit  No results found for this or any previous visit  No results found for this or any previous visit  I reviewed and interpreted the following LABS/EKG/TELE/IMAGING and below is summary of my interpretation (if data available):        Current EKG and Rhythm Strip: NSR low voltage    Reviewed loop strips  Symptoms correlat w sinus tachycardia and PVC  Also had afib episode Aug 13,and prior to that April and may episodes, see discussion       Normal renal function

## 2021-09-02 NOTE — TELEPHONE ENCOUNTER
Pt was here on Wednesday for OV with Dr Brennen Aviles was prescribed for pt  Pt asking about the administration of the Northera, whether it is a tablet that can be crushed and diluted, or a capsule that can be broken and mixed in applesauce  Pt has a feeding tube and before ordering wants to be sure this will work  I reached out to [facility]  I spoke to the representative about the concerns  She advised that the capsule is meant to be taken whole and intact  Before I could reach out to the pt, she called me and said she doesn't think this is doable for her with all the doses she will have to take  I explained to her that, unfortunately, the capsules are meant to be taken whole and intact, so it would not be able to be taken any other way  Also, pt called again this AM asking if Dr Brennen Smith would be able to help her control her erratic BP's  Last night her BP was 200/100, no symptoms, but was anxious and didn't want to fall back to sleep  This AM, was 707 systolic, then decreased to 90  Pt took her dose of midodrine  I will advise Dr Brennen Smith  Thank you

## 2021-09-02 NOTE — TELEPHONE ENCOUNTER
You're welcome  And Brooks Richards is asking if it is possible for you to handle her fluctuating BP's since she cannot take the Northera  If not, can you please recommend someone who may be able to advise her on that? Thank you

## 2021-09-03 NOTE — TELEPHONE ENCOUNTER
Harjeet John, we can readdress midodrine dosing early next week but as stated by Pippa Baxter at the end of her note, I advised the patient to follow those parameters (at least over the weekend since she was concerned) and she will continue to monitor these  It appears that with her increase in salt intake that she may not need as much midodrine but we will see how her BP's trend

## 2021-09-03 NOTE — TELEPHONE ENCOUNTER
The pt called back with her BP's, as she is concerned with the numbers, mainly at night  She was hospitalized at [facility] for hypotension on 8/20/2021, and her midodrine was increased from 5 mg TID to 7 5 mg TID  Pt checks BP before her midodrine doses, and noticed that evening and night time readings were running from 150-200/  These evenings, pt did not take the 7 5 mg midodrine dose  Pt has Lopressor 25 mg tabs at home,  and took 12 5 mg on Tuesday, Wednesday, and Thursday evenings (8/31, 9/1, 9/2)  At one point, when pt's BP was 200/111, she took a Lopressor 12 5 The BP only came down to 180/100, and pt was anxious, could not sleep  The pt had head tingling on another occasion: /100; took Lopressor 12 5 mg, BP came down to 127/80, head tingling subsided  Pt typically takes the midodrine 7 5 mg in the AM, and 5 mg in the afternoon  Then checks BP in evening at night, but does not take her midodrine  Reviewed with [employer]  Pt is to check BP's, if SBP is < 90, pt is to take the midodrine 5 mg  If SBP is > 90, pt is NOT to take the dose  For evenings/nights, when BP is elevated, pt may use 12 5 mg Lopressor  If BP does not decrease in one hour, she may take another dose of 12 5 mg Lopressor  Pt verbalizes understanding  Thank you Charmaine

## 2021-09-04 NOTE — TELEPHONE ENCOUNTER
Dr Gaudencio Ordoñez on call was made awareof patient's request  Dr Gaudencio Ordoñez confirmed that Lopressor 25 mg was sent to [facility]

## 2021-09-04 NOTE — TELEPHONE ENCOUNTER
Regarding: blood pressure medication refill   ----- Message from Dhruv Duckworth sent at 9/4/2021  3:06 PM EDT -----  "the Metoprolol 25mg that I have at home is 3 and a half years years old, I need a new script because I could not be without this medication"   [facility] pharmacy closes at [address] [facility] today

## 2021-09-04 NOTE — TELEPHONE ENCOUNTER
Reason for Disposition   [1] Prescription refill request for ESSENTIAL medicine (i e , likelihood of harm to patient if not taken) AND [2] triager unable to refill per department policy    Answer Assessment - Initial Assessment Questions  1  NAME of MEDICATION: "What medicine are you calling about?"      Metoprolol tartrate 25 mg   2  QUESTION: "What is your question?" (e g , medication refill, side effect)      Patient stated that she has been taking this med for high blood Pressure as needed  The medication hasn't been refilled since 2017  Patient has Lopressor at home but it is not working due to it was   Patient requested an Rx Lopressor to be called in to [facility]  3  PRESCRIBING HCP: "Who prescribed it?" Reason: if prescribed by specialist, call should be referred to that group  Historical prescriber  4  SYMPTOMS: "Do you have any symptoms?"      Occasional hypertension    5   SEVERITY: If symptoms are present, ask "Are they mild, moderate or severe?"      "160s/90s at night time "    Protocols used: MEDICATION QUESTION CALL-ADULT-

## 2021-09-07 NOTE — TELEPHONE ENCOUNTER
Flash Camarillo,     Patient called back to let us know that she has been trying to follow the recommended parameters however her BP has not been cooperating  Over the weekend, her BP was running 160s -170s /   She has not taken midodrine since Saturday  Her BP has been high since  Today, the patient took her BP in the morning and it was 174/97  After that it went down to 120/87 and after that, it went down to 68/55  Patient took the midodrine at this time  She did not take a BP after taking the midodrine  Patient is taking her BP now and it is 106/76

## 2021-09-09 NOTE — TELEPHONE ENCOUNTER
Spoke with Dr Johana Segovia, confirmed instructions on Midodrine  I called patient and explained that she should continue her Midodrine at 5mg tid despite her BP's  She is confused about what to do with her PM Lopressor 12 5mg  She does not take Lopressor in the AM      Dr Johana Segovia would like to defer any further med instructions to Dr Emory Knutson who patient will be seeing 9/24/21  She is very concerned about what to do until she sees Dr Emory Knutson  I will see if Dr Emory Knutson can see patient sooner

## 2021-09-10 NOTE — TELEPHONE ENCOUNTER
I have never seen the patient and am not in the office until the week she is scheduled    If she needs to be seen she has seen Abby Mccauley in the past      Alistair

## 2021-09-14 NOTE — PROGRESS NOTES
Cardiology Follow Up    Tiny Gray  1940  [id number]  [facility]  [facility]  [place] [address]   [phone]    1  Orthostatic hypotension     2  Paroxysmal atrial fibrillation [facility])         Interval History:   Ms Rocío Gray was admitted on 8/13 - 8/15/21 to [facility] with palpitations  Chiquis Courser experienced palpitations and dizziness  EMS called  On route telemetry strips showed atrial fibrillation with  beats per minute  Cardiology was consulted  Atrial fibrillation resolved spontaneously  She remained off beta-blocker due to orthostatic hypotension and bradycardia  Cardiology recommended anti arrhythmics but she declined  It was recommended she follow up with electrophysiology as an outpatient  Subsequently was found to have aUTI,  Urine culture grew E coli sensitive to Augmentin and Bactrim  Augmentin was prescribed  Santiago Pierce was discharged home        Ms Rocío Gray  Was admitted to [address] [place] [address] [address] on 8/20/21 - 8/23/21 with near syncope and Orthostatic hypotension  She presented to  [facility] with generalized weakness and the feeling she may pass out  She was found to have orthostatic hypotension despite midodrine  Midodrine increased from 5mg TID to 7 5 mg t i d  She was encouraged to increase sodium intake fluid intake abdominal binder and compression stockings  Free water flushes were increased with tube feeds  Refused Florinef  She was discharged home       On 8/26/21 Ms Renata Hallman was seen in our office for a recent hospitalization follow up visit  She was accompanied by her son  Santiago Stan was not wearing true compression stocking or abdominal binder  She is sprinkling salt into free water per PEG  According to Santiago Pierce she has a poor quality of life  She is occasionally off balance  She denies recent lightheadedness or dizziness    Andrienne is complaining of increased urination at night  On 8/31/21 ms Lisa May was seen by Dr Ellis Marquez for PAF  It was felt she had a low burden of AF  Continue on Digoxin  It was felt she may benefit from [facility] for orthostatic hypotension  Ms Lisa May is unable to take Northera because its a capsule and cannot be taken via her PEG tube  Ms Lisa May presents to our office for a follow up visit  She is now taking Midodrine 5mg Twice a day and not at night  If her SBP at night is >180 Jean Farmer is taking Metoprolol 12 5mg at night  Jean Farmer is complaining of feeling generally unwell  She is taking midodrine 5mg in am and 3 pm, not at night    If her SBP is > 180 Daniela will take a Metoprolol 12 5mg at night      HPI:  Orthostatic Hypotension 2017   Paroxysmal atrial fibrillation on Eliquis 2 5mg BID for stroke prevention   Loop recorder in place 8/22/21 interrogation showed   Sinus tachycardia with PVCs however cannot exclude atrial tachycardia due to P-wave morphology and abnormalities g atrial fibrillation cannot be excluded  Hypertension since 2011   Mixed Hyperlipidemia   PVC's  CAD [facility] 2/15 60% LAD disease  Lung CA stage 1B, 1/2013 sp Left upper lobectomy  MGUS  2000 Head and neck CA sp radiation   Hx of dysphagia and gastroparesis,   PEG tube 2 shelley HN   3/16/21 TTE: LVEF 60%, Grade 1 DD, mild MR, mild AI, Mild TR, PAP 25mmHg     Patient Active Problem List   Diagnosis    Paroxysmal atrial fibrillation ([address] )    History of lung cancer in adulthood    Supraventricular tachycardia ([address] )    Centrilobular emphysema ([address] )    History of lobectomy of lung    History of head and neck radiation    Moderate protein-calorie malnutrition (HCC)    Orthostatic hypotension    Coronary artery disease involving native coronary artery of native heart without angina pectoris    Mitral and aortic valve regurgitation    Diastolic dysfunction without heart failure    Pericardial effusion    S/P percutaneous endoscopic gastrostomy (PEG) tube placement ([facility] [address] [facility] [address]    Dysphagia    Encounter for care related to feeding tube    Shortness of breath    Nonrheumatic tricuspid valve regurgitation    Voltage-gated potassium channel (VGKC) antibody positive    Lumbar degenerative disc disease    Peripheral polyneuropathy    History of radiation to head and neck region    Autonomic dysfunction    Palpitations    UTI (urinary tract infection)    Numbness in right leg    Episodic lightheadedness     Past Medical History:   Diagnosis Date    Abnormal ECG     Arrhythmia     Asthma     Atrial fibrillation (HCC)     Autonomic dysfunction     Cancer (HCC)     lung, head and neck    Coronary artery disease     Disease of thyroid gland     Dysphagia     Gastroparesis     Heart murmur     mitral and aortic regurg    Hyperlipidemia     Hypertension     Irregular heart beat      Social History     Socioeconomic History    Marital status: Unknown     Spouse name: Not on file    Number of children: Not on file    Years of education: Not on file    Highest education level: Not on file   Occupational History    Occupation: retired - /   Tobacco Use    Smoking status: Former Smoker     Packs/day: 0 50     Years: 20 00     Pack years: 10 00    Smokeless tobacco: Never Used    Tobacco comment: Quit at age 41   Vaping Use    Vaping Use: Never used   Substance and Sexual Activity    Alcohol use: Not Currently    Drug use: Never    Sexual activity: Not on file   Other Topics Concern    Not on file   Social History Narrative    Not on file     Social Determinants of Health     Financial Resource Strain:     Difficulty of Paying Living Expenses:    Food Insecurity:     Worried About Running Out of Food in the Last Year:     Ran Out of Food in the Last Year:    Transportation Needs:     Lack of Transportation (Medical):      Lack of Transportation (Non-Medical):    Physical Activity:     Days of Exercise per Week:     Minutes of Exercise per Session:    Stress:     Feeling of Stress :    Social Connections:     Frequency of Communication with Friends and Family:     Frequency of Social Gatherings with Friends and Family:     Attends [employer]oration[employer] Services:     Active Member of Clubs or Organizations:     Attends Club or Organization Meetings:     Marital Status:    Intimate Partner Violence:     Fear of Current or Ex-Partner:     Emotionally Abused:     Physically Abused:     Sexually Abused:       Family History   Problem Relation Age of Onset    Lung cancer Sister      Past Surgical History:   Procedure Laterality Date    CARDIAC CATHETERIZATION      CARDIAC LOOP RECORDER      ESOPHAGOGASTRODUODENOSCOPY W/ PEG      HEAD AND NECK DEBRIDEMENT      LUNG SURGERY Left     upper lobectomy    PEG TUBE PLACEMENT  01/15/2021    By Dr Love Monday 2017    PEG TUBE PLACEMENT  06/2020    Balloon gastrostomy-replaced 3 more times secondary to balloon failure and Balloon falling out       Current Outpatient Medications:     apixaban (ELIQUIS) 2 5 mg, Take 1 tablet (2 5 mg total) by mouth 2 (two) times a day, Disp: 180 tablet, Rfl: 0    Ascorbic Acid (vitamin C) 1000 MG tablet, Take 2,000 mg by mouth daily, Disp: , Rfl:     atorvastatin (LIPITOR) 20 mg tablet, atorvastatin 20 mg tablet, Disp: , Rfl:     Diclofenac Sodium (Voltaren) 1 %, Voltaren 1 % topical gel, Disp: , Rfl:     digoxin (LANOXIN) 0 125 mg tablet, Take 1 tablet (125 mcg total) by mouth every other day, Disp: 45 tablet, Rfl: 3    fluticasone (FLONASE) 50 mcg/act nasal spray, 2 sprays into each nostril daily, Disp: , Rfl:     levothyroxine 112 mcg tablet, Take 112 mcg by mouth daily , Disp: , Rfl:     metoprolol tartrate (LOPRESSOR) 25 mg tablet, Take 0 5 tablets (12 5 mg total) by mouth 2 (two) times a day Hold the medication if the systolic blood pressure is less than 100 mmHg , Disp: 30 tablet, Rfl: 3    midodrine (PROAMATINE) 5 mg tablet, Take 1 tablet (5 mg total) by mouth 3 (three) times a day (Patient taking differently: Take 7 5 mg by mouth 3 (three) times a day ), Disp: 90 tablet, Rfl: 0    multivitamin (THERAGRAN) TABS, Take 1 tablet by mouth daily, Disp: , Rfl:     Probiotic Product (PROBIOTIC DAILY PO), Take 1 tablet by mouth daily, Disp: , Rfl:     SODIUM FLUORIDE, DENTAL GEL, 1 1 % GEL, SF 5000 Plus 1 1 % dental cream, Disp: , Rfl:   Allergies   Allergen Reactions    Albuterol Other (See Comments)     Other reaction(s): Other (See Comments)  Afib  Causes afib      Methylprednisolone Other (See Comments)     Dizziness   Patient states adverse effect, "i feel weird in the head"       Labs:  Admission on 08/13/2021, Discharged on 08/15/2021   Component Date Value    WBC 08/13/2021 8 85     RBC 08/13/2021 3 85     Hemoglobin 08/13/2021 12 5     Hematocrit 08/13/2021 39 3     MCV 08/13/2021 102*    MCH 08/13/2021 32 5     MCHC 08/13/2021 31 8     RDW 08/13/2021 12 7     MPV 08/13/2021 10 2     Platelets 66/02/3129 265     nRBC 08/13/2021 0     Neutrophils Relative 08/13/2021 71     Immat GRANS % 08/13/2021 0     Lymphocytes Relative 08/13/2021 15     Monocytes Relative 08/13/2021 10     Eosinophils Relative 08/13/2021 3     Basophils Relative 08/13/2021 1     Neutrophils Absolute 08/13/2021 6 36     Immature Grans Absolute 08/13/2021 0 02     Lymphocytes Absolute 08/13/2021 1 32     Monocytes Absolute 08/13/2021 0 87     Eosinophils Absolute 08/13/2021 0 23     Basophils Absolute 08/13/2021 0 05     Protime 08/13/2021 15 0*    INR 08/13/2021 1 21*    PTT 08/13/2021 31     Sodium 08/13/2021 140     Potassium 08/13/2021 4 2     Chloride 08/13/2021 102     CO2 08/13/2021 27     ANION GAP 08/13/2021 11     BUN 08/13/2021 32*    Creatinine 08/13/2021 0 84     Glucose 08/13/2021 97     Calcium 08/13/2021 9 6     AST 08/13/2021 28     ALT 08/13/2021 6*    Alkaline Phosphatase 08/13/2021 107     Total Protein 08/13/2021 7 3     Albumin 08/13/2021 3 7     Total Bilirubin 08/13/2021 0 43     eGFR 08/13/2021 64     TSH 3RD GENERATON 08/13/2021 0 917     Magnesium 08/13/2021 2 5     Troponin I 08/13/2021 <0 02     NT-proBNP 08/13/2021 1,119*    Digoxin Lvl 08/13/2021 0 9     Ventricular Rate 08/13/2021 105     Atrial Rate 08/13/2021 74     NM Interval 08/13/2021 160     QRSD Interval 08/13/2021 68     QT Interval 08/13/2021 364     QTC Interval 08/13/2021 481     P Axis 08/13/2021 68     QRS Axis 08/13/2021 9     T Wave Axis 08/13/2021 34     Color, UA 08/13/2021 Yellow     Clarity, UA 08/13/2021 Cloudy     Specific Gravity, UA 08/13/2021 1 010     pH, UA 08/13/2021 6 5     Leukocytes, UA 08/13/2021 Large*    Nitrite, UA 08/13/2021 Positive*    Protein, UA 08/13/2021 Trace*    Glucose, UA 08/13/2021 Negative     Ketones, UA 08/13/2021 Negative     Urobilinogen, UA 08/13/2021 0 2     Bilirubin, UA 08/13/2021 Negative     Blood, UA 08/13/2021 Small*    RBC, UA 08/13/2021 None Seen     WBC, UA 08/13/2021 Innumerable*    Epithelial Cells 08/13/2021 Occasional     Bacteria, UA 08/13/2021 Innumerable*    Urine Culture 08/13/2021 >100,000 cfu/ml Escherichia coli*    Sodium 08/14/2021 138     Potassium 08/14/2021 4 5     Chloride 08/14/2021 103     CO2 08/14/2021 29     ANION GAP 08/14/2021 6     BUN 08/14/2021 35*    Creatinine 08/14/2021 0 86     Glucose 08/14/2021 93     Glucose, Fasting 08/14/2021 93     Calcium 08/14/2021 9 2     eGFR 08/14/2021 63     WBC 08/14/2021 6 62     RBC 08/14/2021 3 51*    Hemoglobin 08/14/2021 11 4*    Hematocrit 08/14/2021 35 4     MCV 08/14/2021 101*    MCH 08/14/2021 32 5     MCHC 08/14/2021 32 2     RDW 08/14/2021 12 6     Platelets 69/49/3765 258     MPV 08/14/2021 10 4      Imaging: Cardiac EP device report    Result Date: 8/29/2021  Narrative: NON-BILLABLE  CARELINK TRANSMISSION: ALERT/SYMPTOM   SPOKE TO PT SHE VARGAS SNOT RECALL WHY SHE ACTIVATED DEVICE  EGRAM SHOWS SR  DL     Cardiac EP device report    Result Date: 8/19/2021  Narrative: NON-BILLABLE  CARELINK TRANSMISSION: ALERT/SYMPTOM  SPOKE TO PT C/O NEAR FAINTING SPELLS, LIGHTHEADED  SHE FEELING OFF ALL MORNING AND HER HEART WAS RACING  EGRAM SHOWS SR/ST W / PVC  DL       Review of Systems:  Review of Systems   HENT: Positive for hearing loss  Cardiovascular: Positive for palpitations  Neurological: Positive for dizziness and light-headedness  All other systems reviewed and are negative  Physical Exam:  Physical Exam  Vitals reviewed  Constitutional:       Appearance: Normal appearance  HENT:      Head: Normocephalic  Eyes:      Pupils: Pupils are equal, round, and reactive to light  Cardiovascular:      Rate and Rhythm: Normal rate and regular rhythm  Pulses: Normal pulses  Heart sounds: Normal heart sounds  Pulmonary:      Effort: Pulmonary effort is normal       Breath sounds: Normal breath sounds  Abdominal:      General: Bowel sounds are normal       Palpations: Abdomen is soft  Musculoskeletal:         General: Normal range of motion  Cervical back: Normal range of motion  Right lower leg: No edema  Left lower leg: No edema  Skin:     General: Skin is warm and dry  Capillary Refill: Capillary refill takes less than 2 seconds  Neurological:      General: No focal deficit present  Mental Status: She is alert and oriented to person, place, and time  Psychiatric:         Mood and Affect: Mood normal          Behavior: Behavior normal          Discussion/Summary:  1  Orthostatic hypotension-according to Daniela since 2017,  BIANKA sitting 144/70 standing 120/70,  Continue midodrine 5 mg in am and 3pm, wearing lucien LE compression stockings  Continue with Metoprolol tartrate 12 5mg at bedtime is SBP >180  Continue to wear lucien LE compression stocking, add sodium to free water flushed in PEG tube      2  Paroxysmal atrial fibrillation - recently seen by Dr Julianna Higginbotham, EP  Low burden of Atrial fibrillation  Loop recorder in place to monitor PAF episodes  Continue on Digoxin 125mcg daily, Eliquis 2 5mg BID for stroke prevention  Not able to take BB on a regular basis due to orthostatic hypotension  Pt presents for evaluation with c/o "abnormal lab value", reported potassium from routine lab draw on 5/14 of 6.4. Pt denies nay physical complaints. Reports hx of "high potassium, usually 5.2 per pt", on [facility] 2x per week.

## 2021-09-18 NOTE — TELEPHONE ENCOUNTER
Patient calling states "I can see my heart beating through my chest" "It's occasional and it looks like PVC's" "Its not happening anymore and my BP and HR are fine"     Care Advice given, can someone call patient on monday

## 2021-09-18 NOTE — TELEPHONE ENCOUNTER
Regarding: I have a Loop recorder, feel like my heart flip flop in my chest   ----- Message from Luz Maria Gonzalez sent at 9/18/2021  4:19 PM EDT -----  " I have a loop recorder I sent over a report today I have a experience a couple of events and I'm not sure what the mean It feels like heart is flip flop in my cheat it my me feel strange and is doesn't last long and I can see it "

## 2021-09-18 NOTE — TELEPHONE ENCOUNTER
Reason for Disposition   Palpitations are a chronic symptom (recurrent or ongoing AND present > 4 weeks)    Answer Assessment - Initial Assessment Questions  1  DESCRIPTION: "Please describe your heart rate or heartbeat that you are having" (e g , fast/slow, regular/irregular, skipped or extra beats, "palpitations")      Extra beats "flip flop" in chest  2  ONSET: "When did it start?" (Minutes, hours or days)       Hours ago (has been happening on and off for about a month)  3  DURATION: "How long does it last" (e g , seconds, minutes, hours)      A few seconds  4  PATTERN "Does it come and go, or has it been constant since it started?"  "Does it get worse with exertion?"   "Are you feeling it now?"      Comes and goes  5  TAP: "Using your hand, can you tap out what you are feeling on a chair or table in front of you, so that I can hear?" (Note: not all patients can do this)        Unable  6  HEART RATE: "Can you tell me your heart rate?" "How many beats in 15 seconds?"  (Note: not all patients can do this)        Pulse 93 /80  7  RECURRENT SYMPTOM: "Have you ever had this before?" If Yes, ask: "When was the last time?" and "What happened that time?"       Yes  8  CAUSE: "What do you think is causing the palpitations?"      Unsure   9  CARDIAC HISTORY: "Do you have any history of heart disease?" (e g , heart attack, angina, bypass surgery, angioplasty, arrhythmia)       Yes Afib  10  OTHER SYMPTOMS: "Do you have any other symptoms?" (e g , dizziness, chest pain, sweating, difficulty breathing)        No  11   PREGNANCY: "Is there any chance you are pregnant?" "When was your last menstrual period?"        No    Protocols used: HEART RATE AND HEARTBEAT QUESTIONS-ADULT-

## 2021-09-23 NOTE — PROGRESS NOTES
Cardiology Follow Up    Kd Clarke  1940  [id number]  [facility]  [facility]  RYAN [address] [place], [place] [address]   [phone]    1  Orthostatic hypotension     2  Paroxysmal atrial fibrillation (HCC)  apixaban (ELIQUIS) 2 5 mg   3  Supraventricular tachycardia ([address] )     4  Coronary artery disease involving native coronary artery of native heart without angina pectoris     5  Dyslipidemia         Discussion/Summary:  Mrs Tory Clarke is a 49-year-old female who presents to the office today for routine follow-up  She has a known history of orthostatic hypotension  She was orthostatic in the office today  With this she was asymptomatic although she does get symptoms on a frequent basis  Further treatment options were discussed  We discussed increasing the midodrine although she does not feel higher doses or t i d  dosing has helped  We discussed the initiation of fludrocortisone  She expresses hesitancy due to elevated blood pressure readings at times  We also discussed increasing her intake through her PEG tube electrolyte containing fluid to which she is agreeable  She cannot take Northera as she cannot take this via her PEG tube  Otherwise regarding her atrial fibrillation, she is under the care of one of our electrophysiologists  She has relatively low burden noted on her loop recorder checks and is maintained on digoxin alone due to her issues with orthostatic hypotension  She is on systemic anticoagulation with Eliquis  Her most recent lipids from last year reveal acceptable numbers on her current statin regimen which she takes in the setting of known nonobstructive coronary artery disease  She had an echocardiogram during her recent hospital stay which was unremarkable except for mild valvular heart disease  She had a recent stress test which was unremarkable for ischemia      I will see her back in the office in a few months for re-evaluation of her symptoms  Interval History: Mrs Angelita Sethi is a 57-year-old female who presents to the office today for routine follow-up  She has a known history of orthostatic hypotension  She continues to struggle most days of the week with lightheadedness with positional changes  She had a recent episode where she was picking up clothes and stood  She felt very unwell  She felt very lightheaded and diaphoretic  She denies anny syncope  She is maintained on midodrine twice daily  She does not feel that t i d  dosing helps and she is concerned that her blood pressure elevates at night  A few times a month she will take metoprolol given high blood pressure readings in the evening  She has been utilizing compression stockings  She checks her blood pressure regularly at home and feels well when her systolic blood pressure is on the higher side in the 140 to 150 mmHg range  Otherwise she denies any exertional chest pain with the activity she performs  She does note some shortness of breath  She denies any signs or symptoms of congestive heart failure including lower extremity edema, paroxysmal nocturnal dyspnea, orthopnea, or  increasing abdominal girth  She denies anny syncope  She denies symptoms of claudication  Medical Problems     Problem List     Paroxysmal atrial fibrillation (HCC)    History of lung cancer in adulthood    Supraventricular tachycardia ([address] )    Centrilobular emphysema ([facility] )    History of lobectomy of lung    History of head and neck radiation    Moderate protein-calorie malnutrition ([facility] [address] [facility] )    Malnutrition Findings:           BMI Findings: Body mass index is 19 14 kg/m²           Orthostatic hypotension    Coronary artery disease involving native coronary artery of native heart without angina pectoris    Mitral and aortic valve regurgitation    Diastolic dysfunction without heart failure    Pericardial effusion    S/P percutaneous endoscopic gastrostomy (PEG) tube placement [facility])    Overview Signed 12/23/2020  3:38 PM by Karely Aaron MD     Last Assessment & Plan:   Functioning well  Site is stable           Dysphagia    Encounter for care related to feeding tube    Shortness of breath    Nonrheumatic tricuspid valve regurgitation    Voltage-gated potassium channel (VG DASHAWN) antibody positive    Lumbar degenerative disc disease    Peripheral polyneuropathy    History of radiation to head and neck region    Autonomic dysfunction    Palpitations    UTI (urinary tract infection)    Numbness in right leg    Episodic lightheadedness              Past Medical History:   Diagnosis Date    Abnormal ECG     Arrhythmia     Asthma     Atrial fibrillation (HCC)     Autonomic dysfunction     Cancer (HCC)     lung, head and neck    Coronary artery disease     Disease of thyroid gland     Dysphagia     Gastroparesis     Heart murmur     mitral and aortic regurg    Hyperlipidemia     Hypertension     Irregular heart beat      Social History     Socioeconomic History    Marital status: Unknown     Spouse name: Not on file    Number of children: Not on file    Years of education: Not on file    Highest education level: Not on file   Occupational History    Occupation: retired - /   Tobacco Use    Smoking status: Former Smoker     Packs/day: 0 50     Years: 20 00     Pack years: 10 00    Smokeless tobacco: Never Used    Tobacco comment: Quit at age 41   Vaping Use    Vaping Use: Never used   Substance and Sexual Activity    Alcohol use: Not Currently    Drug use: Never    Sexual activity: Not on file   Other Topics Concern    Not on file   Social History Narrative    Not on file     Social Determinants of Health     Financial Resource Strain:     Difficulty of Paying Living Expenses:    Food Insecurity:     Worried About Running Out of Food in the Last Year:     [address] in the Last Year: Transportation Needs:     Lack of Transportation (Medical):      Lack of Transportation (Non-Medical):    Physical Activity:     Days of Exercise per Week:     Minutes of Exercise per Session:    Stress:     Feeling of Stress :    Social Connections:     Frequency of Communication with Friends and Family:     Frequency of Social Gatherings with Friends and Family:     Attends L[employer] Services:     Active Member of Clubs or Organizations:     Attends Club or Organization Meetings:     Marital Status:    Intimate Partner Violence:     Fear of Current or Ex-Partner:     Emotionally Abused:     Physically Abused:     Sexually Abused:       Family History   Problem Relation Age of Onset    Lung cancer Sister      Past Surgical History:   Procedure Laterality Date    CARDIAC CATHETERIZATION      CARDIAC LOOP RECORDER      ESOPHAGOGASTRODUODENOSCOPY W/ PEG      HEAD AND NECK DEBRIDEMENT      LUNG SURGERY Left     upper lobectomy    PEG TUBE PLACEMENT  01/15/2021    By Dr Arteaga Lav  2017    PEG TUBE PLACEMENT  06/2020    Balloon gastrostomy-replaced 3 more times secondary to balloon failure and Balloon falling out       Current Outpatient Medications:     apixaban (ELIQUIS) 2 5 mg, Take 1 tablet (2 5 mg total) by mouth 2 (two) times a day, Disp: 180 tablet, Rfl: 3    Ascorbic Acid (vitamin C) 1000 MG tablet, Take 2,000 mg by mouth daily, Disp: , Rfl:     atorvastatin (LIPITOR) 20 mg tablet, atorvastatin 20 mg tablet, Disp: , Rfl:     digoxin (LANOXIN) 0 125 mg tablet, Take 1 tablet (125 mcg total) by mouth every other day, Disp: 45 tablet, Rfl: 3    levothyroxine 112 mcg tablet, Take 112 mcg by mouth daily , Disp: , Rfl:     metoprolol tartrate (LOPRESSOR) 25 mg tablet, HS PRN For SBP >180, Disp: 30 tablet, Rfl: 3    midodrine (PROAMATINE) 5 mg tablet, Take 1 tablet (5 mg total) by mouth 3 (three) times a day (Patient taking differently: Take 5 mg by mouth 2 (two) times a day ), Disp: 90 tablet, Rfl: 0    multivitamin (THERAGRAN) TABS, Take 1 tablet by mouth daily, Disp: , Rfl:     Probiotic Product (PROBIOTIC DAILY PO), Take 1 tablet by mouth daily, Disp: , Rfl:     fluticasone (FLONASE) 50 mcg/act nasal spray, 2 sprays into each nostril daily (Patient not taking: Reported on 9/24/2021), Disp: , Rfl:   Allergies   Allergen Reactions    Albuterol Other (See Comments)     Other reaction(s): Other (See Comments)  Afib  Causes afib      Methylprednisolone Other (See Comments)     Dizziness  Patient states adverse effect, "i feel weird in the head"       Labs:     Chemistry        Component Value Date/Time    K 4 5 08/14/2021 0602     08/14/2021 0602    CO2 29 08/14/2021 0602    BUN 35 (H) 08/14/2021 0602    CREATININE 0 86 08/14/2021 0602        Component Value Date/Time    CALCIUM 9 2 08/14/2021 0602    ALKPHOS 107 08/13/2021 1005    AST 28 08/13/2021 1005    ALT 6 (L) 08/13/2021 1005            No results found for: CHOL  No results found for: HDL  No results found for: LDLCALC  No results found for: TRIG  No results found for: CHOLHDL    Imaging: Cardiac EP device report    Result Date: 8/29/2021  Narrative: NON-BILLABLE  CARELINK TRANSMISSION: ALERT/SYMPTOM  SPOKE TO PT SHE VARGAS SNOT RECALL WHY SHE ACTIVATED DEVICE  EGRAM SHOWS SR  DL         Review of Systems   Cardiovascular: Positive for dyspnea on exertion and palpitations  Negative for chest pain  All other systems reviewed and are negative  Vitals:    09/24/21 1258   BP: 156/80   Pulse: 87     Vitals:    09/24/21 1258   Weight: 52 2 kg (115 lb)     Height: 5' 5" (165 1 cm)   Body mass index is 19 14 kg/m²      Physical Exam:   General appearance:  Appears stated age, alert, well appearing and in no distress  HEENT:  PERRLA, EOMI, no scleral icterus, no conjunctival pallor  NECK:  Supple, No elevated JVP, no thyromegaly, no carotid bruits  HEART:  Regular rate and rhythm, normal S1/S2, no S3/S4, no murmur or rub  LUNGS:  Clear to auscultation bilaterally  ABDOMEN:  Soft, non-tender, positive bowel sounds, no rebound or guarding, no organomegaly, + PEG tube in place   EXTREMITIES:  No edema  VASCULAR:  Normal pedal pulses   SKIN: No lesions or rashes on exposed skin  NEURO:  CN II-XII intact, no focal deficits

## 2021-09-24 PROBLEM — E78.5 DYSLIPIDEMIA: Status: ACTIVE | Noted: 2021-01-01

## 2024-02-09 NOTE — TELEPHONE ENCOUNTER
LMOM for Sravan Watt in regards to the normal results of her recent testing as per Armando Haynes  18
